# Patient Record
Sex: FEMALE | Race: BLACK OR AFRICAN AMERICAN | NOT HISPANIC OR LATINO | Employment: UNEMPLOYED | URBAN - METROPOLITAN AREA
[De-identification: names, ages, dates, MRNs, and addresses within clinical notes are randomized per-mention and may not be internally consistent; named-entity substitution may affect disease eponyms.]

---

## 2017-09-22 ENCOUNTER — HOSPITAL ENCOUNTER (INPATIENT)
Facility: HOSPITAL | Age: 72
LOS: 5 days | Discharge: HOME HEALTH | DRG: 570 | End: 2017-09-27
Attending: EMERGENCY MEDICINE | Admitting: INTERNAL MEDICINE
Payer: MEDICARE

## 2017-09-22 DIAGNOSIS — N18.6 ESRD (END STAGE RENAL DISEASE): ICD-10-CM

## 2017-09-22 DIAGNOSIS — L02.91 ABSCESS: ICD-10-CM

## 2017-09-22 DIAGNOSIS — E66.01 MORBID OBESITY DUE TO EXCESS CALORIES: ICD-10-CM

## 2017-09-22 DIAGNOSIS — L03.115 CELLULITIS AND ABSCESS OF RIGHT LEG: Primary | ICD-10-CM

## 2017-09-22 DIAGNOSIS — L02.415 CELLULITIS AND ABSCESS OF RIGHT LEG: Primary | ICD-10-CM

## 2017-09-22 LAB
ANION GAP SERPL CALC-SCNC: 15 MMOL/L
BASOPHILS # BLD AUTO: 0 K/UL
BASOPHILS NFR BLD: 0.1 %
BUN SERPL-MCNC: 42 MG/DL
CALCIUM SERPL-MCNC: 7.5 MG/DL
CHLORIDE SERPL-SCNC: 98 MMOL/L
CO2 SERPL-SCNC: 20 MMOL/L
CREAT SERPL-MCNC: 9.2 MG/DL
DIFFERENTIAL METHOD: ABNORMAL
EOSINOPHIL # BLD AUTO: 0.2 K/UL
EOSINOPHIL NFR BLD: 0.9 %
ERYTHROCYTE [DISTWIDTH] IN BLOOD BY AUTOMATED COUNT: 13.2 %
EST. GFR  (AFRICAN AMERICAN): 5 ML/MIN/1.73 M^2
EST. GFR  (NON AFRICAN AMERICAN): 4 ML/MIN/1.73 M^2
GLUCOSE SERPL-MCNC: 185 MG/DL
HCT VFR BLD AUTO: 28.5 %
HGB BLD-MCNC: 9.6 G/DL
LACTATE SERPL-SCNC: 1.1 MMOL/L
LYMPHOCYTES # BLD AUTO: 0.9 K/UL
LYMPHOCYTES NFR BLD: 3.6 %
MCH RBC QN AUTO: 30.5 PG
MCHC RBC AUTO-ENTMCNC: 33.5 G/DL
MCV RBC AUTO: 91 FL
MONOCYTES # BLD AUTO: 1.5 K/UL
MONOCYTES NFR BLD: 6 %
NEUTROPHILS # BLD AUTO: 22.3 K/UL
NEUTROPHILS NFR BLD: 89.4 %
PLATELET # BLD AUTO: 245 K/UL
PMV BLD AUTO: 9.6 FL
POCT GLUCOSE: 163 MG/DL (ref 70–110)
POCT GLUCOSE: 298 MG/DL (ref 70–110)
POTASSIUM SERPL-SCNC: 3.6 MMOL/L
RBC # BLD AUTO: 3.14 M/UL
SODIUM SERPL-SCNC: 133 MMOL/L
WBC # BLD AUTO: 24.9 K/UL

## 2017-09-22 PROCEDURE — 63600175 PHARM REV CODE 636 W HCPCS: Performed by: INTERNAL MEDICINE

## 2017-09-22 PROCEDURE — 25000003 PHARM REV CODE 250: Performed by: INTERNAL MEDICINE

## 2017-09-22 PROCEDURE — 83605 ASSAY OF LACTIC ACID: CPT

## 2017-09-22 PROCEDURE — 36415 COLL VENOUS BLD VENIPUNCTURE: CPT

## 2017-09-22 PROCEDURE — 0H9HXZZ DRAINAGE OF RIGHT UPPER LEG SKIN, EXTERNAL APPROACH: ICD-10-PCS | Performed by: EMERGENCY MEDICINE

## 2017-09-22 PROCEDURE — 10061 I&D ABSCESS COMP/MULTIPLE: CPT | Mod: RT

## 2017-09-22 PROCEDURE — 85025 COMPLETE CBC W/AUTO DIFF WBC: CPT

## 2017-09-22 PROCEDURE — 25000003 PHARM REV CODE 250: Performed by: EMERGENCY MEDICINE

## 2017-09-22 PROCEDURE — 99222 1ST HOSP IP/OBS MODERATE 55: CPT | Mod: ,,, | Performed by: INTERNAL MEDICINE

## 2017-09-22 PROCEDURE — 63600175 PHARM REV CODE 636 W HCPCS: Performed by: EMERGENCY MEDICINE

## 2017-09-22 PROCEDURE — 94760 N-INVAS EAR/PLS OXIMETRY 1: CPT

## 2017-09-22 PROCEDURE — 12000002 HC ACUTE/MED SURGE SEMI-PRIVATE ROOM

## 2017-09-22 PROCEDURE — 80048 BASIC METABOLIC PNL TOTAL CA: CPT

## 2017-09-22 PROCEDURE — 99284 EMERGENCY DEPT VISIT MOD MDM: CPT | Mod: 25

## 2017-09-22 PROCEDURE — 94761 N-INVAS EAR/PLS OXIMETRY MLT: CPT

## 2017-09-22 PROCEDURE — 83036 HEMOGLOBIN GLYCOSYLATED A1C: CPT

## 2017-09-22 RX ORDER — INSULIN ASPART 100 [IU]/ML
0-5 INJECTION, SOLUTION INTRAVENOUS; SUBCUTANEOUS
Status: DISCONTINUED | OUTPATIENT
Start: 2017-09-22 | End: 2017-09-27 | Stop reason: HOSPADM

## 2017-09-22 RX ORDER — PANTOPRAZOLE SODIUM 40 MG/1
40 TABLET, DELAYED RELEASE ORAL DAILY
Status: DISCONTINUED | OUTPATIENT
Start: 2017-09-23 | End: 2017-09-27 | Stop reason: HOSPADM

## 2017-09-22 RX ORDER — ENOXAPARIN SODIUM 100 MG/ML
30 INJECTION SUBCUTANEOUS
Status: DISCONTINUED | OUTPATIENT
Start: 2017-09-22 | End: 2017-09-23

## 2017-09-22 RX ORDER — GABAPENTIN 100 MG/1
100 CAPSULE ORAL DAILY
Status: DISCONTINUED | OUTPATIENT
Start: 2017-09-22 | End: 2017-09-27 | Stop reason: HOSPADM

## 2017-09-22 RX ORDER — GUAIFENESIN 600 MG/1
1200 TABLET, EXTENDED RELEASE ORAL 2 TIMES DAILY
Status: ON HOLD | COMMUNITY
End: 2017-09-22

## 2017-09-22 RX ORDER — CALCIUM CARBONATE 200(500)MG
1 TABLET,CHEWABLE ORAL DAILY
COMMUNITY

## 2017-09-22 RX ORDER — ACETAMINOPHEN 325 MG/1
650 TABLET ORAL EVERY 6 HOURS PRN
Status: DISCONTINUED | OUTPATIENT
Start: 2017-09-22 | End: 2017-09-27 | Stop reason: HOSPADM

## 2017-09-22 RX ORDER — HYDROMORPHONE HYDROCHLORIDE 2 MG/ML
0.5 INJECTION, SOLUTION INTRAMUSCULAR; INTRAVENOUS; SUBCUTANEOUS EVERY 4 HOURS PRN
Status: DISCONTINUED | OUTPATIENT
Start: 2017-09-22 | End: 2017-09-27 | Stop reason: HOSPADM

## 2017-09-22 RX ORDER — IBUPROFEN 200 MG
16 TABLET ORAL
Status: DISCONTINUED | OUTPATIENT
Start: 2017-09-22 | End: 2017-09-27 | Stop reason: HOSPADM

## 2017-09-22 RX ORDER — GLUCAGON 1 MG
1 KIT INJECTION
Status: DISCONTINUED | OUTPATIENT
Start: 2017-09-22 | End: 2017-09-27 | Stop reason: HOSPADM

## 2017-09-22 RX ORDER — CALCIUM CARBONATE 200(500)MG
1 TABLET,CHEWABLE ORAL DAILY
Status: DISCONTINUED | OUTPATIENT
Start: 2017-09-22 | End: 2017-09-27 | Stop reason: HOSPADM

## 2017-09-22 RX ORDER — LIDOCAINE HYDROCHLORIDE 20 MG/ML
10 INJECTION, SOLUTION EPIDURAL; INFILTRATION; INTRACAUDAL; PERINEURAL
Status: COMPLETED | OUTPATIENT
Start: 2017-09-22 | End: 2017-09-22

## 2017-09-22 RX ORDER — GABAPENTIN 100 MG/1
100 CAPSULE ORAL NIGHTLY
COMMUNITY

## 2017-09-22 RX ORDER — HYDROCODONE BITARTRATE AND ACETAMINOPHEN 5; 325 MG/1; MG/1
1 TABLET ORAL EVERY 6 HOURS PRN
Status: DISCONTINUED | OUTPATIENT
Start: 2017-09-22 | End: 2017-09-27 | Stop reason: HOSPADM

## 2017-09-22 RX ORDER — LORATADINE 10 MG/1
10 TABLET ORAL DAILY
COMMUNITY
End: 2023-05-18 | Stop reason: ALTCHOICE

## 2017-09-22 RX ORDER — IBUPROFEN 200 MG
24 TABLET ORAL
Status: DISCONTINUED | OUTPATIENT
Start: 2017-09-22 | End: 2017-09-27 | Stop reason: HOSPADM

## 2017-09-22 RX ADMIN — INSULIN ASPART 1 UNITS: 100 INJECTION, SOLUTION INTRAVENOUS; SUBCUTANEOUS at 10:09

## 2017-09-22 RX ADMIN — HYDROCODONE BITARTRATE AND ACETAMINOPHEN 1 TABLET: 5; 325 TABLET ORAL at 07:09

## 2017-09-22 RX ADMIN — VANCOMYCIN HYDROCHLORIDE 1500 MG: 1 INJECTION, POWDER, LYOPHILIZED, FOR SOLUTION INTRAVENOUS at 05:09

## 2017-09-22 RX ADMIN — ENOXAPARIN SODIUM 30 MG: 100 INJECTION SUBCUTANEOUS at 06:09

## 2017-09-22 RX ADMIN — PIPERACILLIN SODIUM AND TAZOBACTAM SODIUM 4.5 G: 4; .5 INJECTION, POWDER, FOR SOLUTION INTRAVENOUS at 10:09

## 2017-09-22 RX ADMIN — LIDOCAINE HYDROCHLORIDE 200 MG: 20 INJECTION, SOLUTION EPIDURAL; INFILTRATION; INTRACAUDAL; PERINEURAL at 12:09

## 2017-09-22 RX ADMIN — GABAPENTIN 100 MG: 100 CAPSULE ORAL at 06:09

## 2017-09-22 NOTE — PROGRESS NOTES
5:06 PM  Patient arrived from ED w/ order for 1x Vancomycin IV. Vanc bag spiked on new tubing/arrived with patient upon admission to floor. Per Dr Sparks's note, patient to be dialyzed in AM.     Called Dr Collier/Bridger's on call line- Dr Collier on call. Voice msg left for him regarding dose of Vancomycin-- whether to be given/held at this time. Awaiting call back.     5:09 PM  Received call back. Dr Collier states OK to administer dose of Vancomycin now.

## 2017-09-22 NOTE — NURSING
Patient transferred to room from ER. Vitals stable. Dr. Sparks notified regarding Nephrologist consult. Spoke with Rob at Dr. Sparks's office

## 2017-09-22 NOTE — ED PROVIDER NOTES
"Encounter Date: 9/22/2017    SCRIBE #1 NOTE: I, Chloe Cruz, am scribing for, and in the presence of, Dr. Addison.       History     Chief Complaint   Patient presents with    Fatigue     feeling weak and too tired to go to dialysis today, decreased appetite     9/22/2017  11:50 AM     Chief Complaint: Fatigue    The patient is a 72 y.o. female with PMHx of dialysis and DM who presents with fatigue. Patient c/o gradual onset of fatigue that has been constant for the past couple of days. Associated with nausea, decreased appetite and "feeling hot". No reported fevers. Pt is not drinking fluids. Denies any vomiting, diarrhea or abdominal pain. Pt also reports a rash to the right inner thigh with sharp pain and drainage for 1 week. Her rash rubs when walking and increases pain. No recent confusion or LOC. Pt was seen at ER 5 days ago for headache, ear pain and runny nose. Reports her symptoms are improving. No congestion or sob. Patient is Monday, Wednesday and Friday dialysis in [place]. Pt does not make urine. No SHx on file.      The history is provided by the patient.     Review of patient's allergies indicates:   Allergen Reactions    Ace inhibitors Other (See Comments)    Betadine [povidone-iodine] Rash    Dye Other (See Comments)    Gentamicin Rash     No past medical history on file.  No past surgical history on file.  No family history on file.  Social History   Substance Use Topics    Smoking status: Not on file    Smokeless tobacco: Not on file    Alcohol use Not on file     Review of Systems   Constitutional: Positive for appetite change and fatigue. Negative for chills and fever.   HENT: Positive for rhinorrhea. Negative for congestion and sore throat.    Respiratory: Negative for cough and shortness of breath.    Cardiovascular: Negative for chest pain.   Gastrointestinal: Positive for nausea. Negative for abdominal pain, diarrhea and vomiting.   Musculoskeletal: Positive for myalgias. " Negative for back pain.   Skin: Positive for rash.   Neurological: Negative for weakness and numbness.   Hematological: Does not bruise/bleed easily.   All other systems reviewed and are negative.      Physical Exam     Initial Vitals [09/22/17 1031]   BP Pulse Resp Temp SpO2   (!) 96/48 92 18 99.1 °F (37.3 °C) --      MAP       64         Physical Exam    Nursing note and vitals reviewed.  Constitutional: No distress.   HENT:   Head: Normocephalic and atraumatic.   Mouth/Throat: Mucous membranes are normal.   Eyes: EOM are normal. Pupils are equal, round, and reactive to light.   Neck: Normal range of motion.   Cardiovascular: Normal rate, regular rhythm, normal heart sounds, intact distal pulses and normal pulses. Exam reveals no gallop and no friction rub.    No murmur heard.  Pulses:       Radial pulses are 2+ on the right side, and 2+ on the left side.        Dorsalis pedis pulses are 2+ on the right side, and 2+ on the left side.        Posterior tibial pulses are 2+ on the right side, and 2+ on the left side.   Left upper extremity fistula with palpable thrill.   Pulmonary/Chest: Breath sounds normal. She has no wheezes. She has no rhonchi. She has no rales.   Abdominal: Soft. She exhibits no distension. There is no tenderness.   Musculoskeletal: Normal range of motion. She exhibits edema.   Trace pitting pedal edema.   Neurological: She is alert and oriented to person, place, and time.   Skin: Skin is dry. No rash noted.   Area of 10 cm induration, erythema and TTP with 2-3 cm area of fluctuance and spontaneous drainage. Does not extend to the perineum. No crepitus.   Psychiatric: She has a normal mood and affect.         ED Course   Procedures  Labs Reviewed   CBC W/ AUTO DIFFERENTIAL - Abnormal; Notable for the following:        Result Value    WBC 24.90 (*)     RBC 3.14 (*)     Hemoglobin 9.6 (*)     Hematocrit 28.5 (*)     Gran # 22.3 (*)     Lymph # 0.9 (*)     Mono # 1.5 (*)     Gran% 89.4 (*)      Lymph% 3.6 (*)     All other components within normal limits   BASIC METABOLIC PANEL - Abnormal; Notable for the following:     Sodium 133 (*)     CO2 20 (*)     Glucose 185 (*)     BUN, Bld 42 (*)     Creatinine 9.2 (*)     Calcium 7.5 (*)     eGFR if  5 (*)     eGFR if non  4 (*)     All other components within normal limits   LACTIC ACID, PLASMA               ABSCESS SIMPLE INCISION & DRAINAGE:  Verbal consent was obtained prior to the procedure with explanation of risks, benefits, and alternatives.  The area was anesthetized with 2% lidocaine intradermal infiltration prior to the start of the procedure.  A 6 cm abscess on the right medial thigh was prepared with betadine,  then incised with a #11 blade at 2 different locations for 2 cm each.  Copious purulent drainage was obtained.  The wound was swept to its depth 360 degrees with a hemostat to break up any loculations.  There were no recognized complications and the patient tolerated the procedure well.  Packing was inserted into the more proxima of the 2 incisions made.            Scribe Attestation:   Scribe #1: I performed the above scribed service and the documentation accurately describes the services I performed. I attest to the accuracy of the note.    Attending Attestation:           Physician Attestation for Scribe:  Physician Attestation Statement for Scribe #1: I, Dr. Addison, reviewed documentation, as scribed by Chloe Cruz in my presence, and it is both accurate and complete.         Isabella Boone is a 72 y.o. female presenting with constitutional symptoms of weakness with new cellulitis and abscess extending to the patient's adipose tissue on the right medial leg.  Low suspicion for penetrating into deeper fascia with wound probed to its depth.  Based on patient's comorbidities and extensive nature, I will admit for observation and IV antibiotics initially.  I have spoken with Dr. Howell from the  hospitalist service who will assume care.  IV vancomycin initially given here with further doses to be coordinated with dialysis.  I doubt sepsis.  I do not think broader spectrum antibiotics are necessary at this point.  I do not think further imaging or general surgery consultation is necessary at this point.  I doubt necrotizing infection.        ED Course      Clinical Impression:   The encounter diagnosis was Cellulitis and abscess of right leg.                           Norberto Addison MD  09/22/17 9707

## 2017-09-22 NOTE — H&P
"PCP: Primary Doctor No    History & Physical    Chief Complaint: Profound weakness    History of Present Illness:  Patient is a 72 y.o. female admitted to Hospitalist Service from [facility] Emergency Room with complaint of profound weakness. Patient reportedly has past medical history significant for morbid obesity, ESRD (on HD - M/W/F at [facility]). Patient c/o gradual onset of fatigue that has been constant for the past couple of days. Associated with nausea, decreased appetite and "feeling hot". No reported fevers. Pt is not drinking fluids. Denied any vomiting, diarrhea or abdominal pain. Pt also reported a rash to the right inner thigh with sharp pain and drainage for 1 week. Her rash rubs when walking and increases pain. No recent confusion or LOC. Pt was seen at ER 5 days ago for headache, ear pain and runny nose. Patient denied chest pain, shortness of breath, abdominal pain, nausea, vomiting, headache, vision changes, focal neuro-deficits, cough or fever. In the ER, noted to have right medial thigh and an abscess was I+D performed and wound packing done by the ER doctor.    PMH:  ESRD, obesity    No past surgical history on file.  No family history on file.  Social History   Substance Use Topics    Smoking status: Not on file    Smokeless tobacco: Not on file    Alcohol use Not on file      Review of patient's allergies indicates:   Allergen Reactions    Ace inhibitors Other (See Comments)    Betadine [povidone-iodine] Rash    Dye Other (See Comments)    Gentamicin Rash       (Not in a hospital admission)  Review of Systems:  Constitutional: no fever or chills. + fatigue, + low appetite  Eyes: no visual changes  Ears, nose, mouth, throat, and face: + Rhinorrhea  Respiratory: no cough or shorness of breath  Cardiovascular: no chest pain or palpitations  Gastrointestinal: no nausea or vomiting, no abdominal pain or change in bowel habits  Genitourinary: no hematuria or " dysuria  Integument/breast: + rash   Hematologic/lymphatic: no easy bruising or lymphadenopathy  Musculoskeletal: + arthralgias / myalgias  Neurological: no seizures or tremors.  Behavioral/Psych: no auditory or visual hallucinations  Endocrine: no heat or cold intolerance     OBJECTIVE:     Vital Signs (Most Recent)  Temp: 99.1 °F (37.3 °C) (09/22/17 1031)  Pulse: 88 (09/22/17 1437)  Resp: 18 (09/22/17 1437)  BP: (!) 105/56 (09/22/17 1437)  SpO2: 99 % (09/22/17 1231)    Physical Exam:  General appearance: well developed, appears stated age  Head: normocephalic, atraumatic  Eyes:  conjunctivae/corneas clear. PERRL.  Nose: Nares normal. Septum midline.  Throat: lips, mucosa, and tongue normal; teeth and gums normal, no throat erythema.  Neck: supple, symmetrical, trachea midline, no JVD and thyroid not enlarged, symmetric, no tenderness/mass/nodules  Lungs:  clear to auscultation bilaterally and normal respiratory effort  Chest wall: no tenderness  Heart: regular rate and rhythm, S1, S2 normal, no murmur, click, rub or gallop  Abdomen: soft, non-tender non-distented; bowel sounds normal; no masses,  no organomegaly  Extremities: no cyanosis, clubbing. Trace pitting pedal edema. Left upper extremity fistula with palpable thrill.   Pulses: 2+ and symmetric  Skin: Skin color, texture, turgor normal. Area of 10 cm induration, erythema and TTP with 2-3 cm area of fluctuance and spontaneous drainage. Does not extend to the perineum. No crepitus.   Lymph nodes: Cervical, supraclavicular, and axillary nodes normal.  Neurologic: Normal strength and tone. No focal numbness or weakness. CNII-XII intact.      Laboratory:   CBC:   Recent Labs  Lab 09/22/17  1310   WBC 24.90*   RBC 3.14*   HGB 9.6*   HCT 28.5*      MCV 91   MCH 30.5   MCHC 33.5     CMP:   Recent Labs  Lab 09/22/17  1310   *   CALCIUM 7.5*   *   K 3.6   CO2 20*   CL 98   BUN 42*   CREATININE 9.2*     Lactate: 1.1    No results found for:  HGBA1C  Microbiology Results (last 7 days)     ** No results found for the last 168 hours. **        Diagnostic Results: None    Assessment/Plan:     Active Hospital Problems    Diagnosis  POA    Cellulitis and abscess of right leg [L03.115, L02.415]  Keep extremity elevated.   Continue IV Zosyn and Vancomycin. Vancomycin doing per pharmacy.  Follow wound culture results.  Will consult general surgeon for evaluation, may need further I+D.    Yes    ESRD (end stage renal disease) [N18.6]  Consult a Nephrology Service for routine HD.  Monitor BUN/SCr.  Monitor I/Os.  Monitor electrolytes.    Yes    Morbid obesity [E66.01]  Body mass index is 42.6 kg/m². Morbid obesity complicates all aspects of disease management from diagnostic modalities to treatment. Weight loss encouraged and health benefits explained to patient.    DM-2  Check blood glucose level q AC/HS.  Use Novolog Insulin Sliding Scale as needed.   Continue American Diabetic Association 1800 Kcal diet.  Yes        VTE Risk Mitigation         Ordered     enoxaparin injection 30 mg  Every 24 hours (non-standard times)     Route:  Subcutaneous        09/22/17 1608     Low Risk of VTE  Once      09/22/17 1534        Joce Barney MD  Department of Hospital Medicine   [facility]

## 2017-09-22 NOTE — PROGRESS NOTES
Received consult for dialysis. Patient missed HD today due to feeling poorly. She has cellulitis with an abscess on her R thigh s/p I& D. Patient is hypotensive with an elevated WBC count. Renal panel reviewed- no acute clearance needs today. Will plan for dialysis in AM as long as she is hemodynamically stable. Complete consult to follow in AM.

## 2017-09-23 LAB
ANION GAP SERPL CALC-SCNC: 12 MMOL/L
BASOPHILS # BLD AUTO: 0 K/UL
BASOPHILS NFR BLD: 0 %
BUN SERPL-MCNC: 50 MG/DL
CALCIUM SERPL-MCNC: 7 MG/DL
CHLORIDE SERPL-SCNC: 98 MMOL/L
CO2 SERPL-SCNC: 22 MMOL/L
CREAT SERPL-MCNC: 10.6 MG/DL
DIFFERENTIAL METHOD: ABNORMAL
EOSINOPHIL # BLD AUTO: 0.4 K/UL
EOSINOPHIL NFR BLD: 2.1 %
ERYTHROCYTE [DISTWIDTH] IN BLOOD BY AUTOMATED COUNT: 13.6 %
EST. GFR  (AFRICAN AMERICAN): 4 ML/MIN/1.73 M^2
EST. GFR  (NON AFRICAN AMERICAN): 3 ML/MIN/1.73 M^2
ESTIMATED AVG GLUCOSE: 117 MG/DL
GLUCOSE SERPL-MCNC: 196 MG/DL
HBA1C MFR BLD HPLC: 5.7 %
HCT VFR BLD AUTO: 26.7 %
HGB BLD-MCNC: 9.1 G/DL
LYMPHOCYTES # BLD AUTO: 0.9 K/UL
LYMPHOCYTES NFR BLD: 4.7 %
MAGNESIUM SERPL-MCNC: 1.6 MG/DL
MCH RBC QN AUTO: 31.2 PG
MCHC RBC AUTO-ENTMCNC: 34.3 G/DL
MCV RBC AUTO: 91 FL
MONOCYTES # BLD AUTO: 1.4 K/UL
MONOCYTES NFR BLD: 7.3 %
NEUTROPHILS # BLD AUTO: 16.6 K/UL
NEUTROPHILS NFR BLD: 85.9 %
PHOSPHATE SERPL-MCNC: 2 MG/DL
PLATELET # BLD AUTO: 236 K/UL
PMV BLD AUTO: 9.8 FL
POCT GLUCOSE: 126 MG/DL (ref 70–110)
POCT GLUCOSE: 156 MG/DL (ref 70–110)
POCT GLUCOSE: 222 MG/DL (ref 70–110)
POTASSIUM SERPL-SCNC: 3.5 MMOL/L
RBC # BLD AUTO: 2.94 M/UL
SODIUM SERPL-SCNC: 132 MMOL/L
WBC # BLD AUTO: 19.3 K/UL

## 2017-09-23 PROCEDURE — 36415 COLL VENOUS BLD VENIPUNCTURE: CPT

## 2017-09-23 PROCEDURE — 25000003 PHARM REV CODE 250: Performed by: INTERNAL MEDICINE

## 2017-09-23 PROCEDURE — 99223 1ST HOSP IP/OBS HIGH 75: CPT | Mod: ,,, | Performed by: SURGERY

## 2017-09-23 PROCEDURE — 80048 BASIC METABOLIC PNL TOTAL CA: CPT

## 2017-09-23 PROCEDURE — 83735 ASSAY OF MAGNESIUM: CPT

## 2017-09-23 PROCEDURE — 85025 COMPLETE CBC W/AUTO DIFF WBC: CPT

## 2017-09-23 PROCEDURE — 63600175 PHARM REV CODE 636 W HCPCS: Performed by: INTERNAL MEDICINE

## 2017-09-23 PROCEDURE — 25000003 PHARM REV CODE 250: Performed by: EMERGENCY MEDICINE

## 2017-09-23 PROCEDURE — 84100 ASSAY OF PHOSPHORUS: CPT

## 2017-09-23 PROCEDURE — 12000002 HC ACUTE/MED SURGE SEMI-PRIVATE ROOM

## 2017-09-23 PROCEDURE — 80100016 HC MAINTENANCE HEMODIALYSIS

## 2017-09-23 RX ORDER — SODIUM CHLORIDE 9 MG/ML
INJECTION, SOLUTION INTRAVENOUS
Status: CANCELLED | OUTPATIENT
Start: 2017-09-23

## 2017-09-23 RX ORDER — HEPARIN SODIUM 5000 [USP'U]/ML
5000 INJECTION, SOLUTION INTRAVENOUS; SUBCUTANEOUS
Status: CANCELLED | OUTPATIENT
Start: 2017-09-23

## 2017-09-23 RX ORDER — SODIUM CHLORIDE 9 MG/ML
INJECTION, SOLUTION INTRAVENOUS ONCE
Status: CANCELLED | OUTPATIENT
Start: 2017-09-23 | End: 2017-09-23

## 2017-09-23 RX ADMIN — INSULIN ASPART 2 UNITS: 100 INJECTION, SOLUTION INTRAVENOUS; SUBCUTANEOUS at 12:09

## 2017-09-23 RX ADMIN — ENOXAPARIN SODIUM 30 MG: 100 INJECTION SUBCUTANEOUS at 05:09

## 2017-09-23 RX ADMIN — PIPERACILLIN SODIUM AND TAZOBACTAM SODIUM 4.5 G: 4; .5 INJECTION, POWDER, FOR SOLUTION INTRAVENOUS at 05:09

## 2017-09-23 RX ADMIN — CALCIUM CARBONATE (ANTACID) CHEW TAB 500 MG 500 MG: 500 CHEW TAB at 08:09

## 2017-09-23 RX ADMIN — HYDROCODONE BITARTRATE AND ACETAMINOPHEN 1 TABLET: 5; 325 TABLET ORAL at 11:09

## 2017-09-23 RX ADMIN — PIPERACILLIN SODIUM AND TAZOBACTAM SODIUM 4.5 G: 4; .5 INJECTION, POWDER, FOR SOLUTION INTRAVENOUS at 01:09

## 2017-09-23 RX ADMIN — HYDROCODONE BITARTRATE AND ACETAMINOPHEN 1 TABLET: 5; 325 TABLET ORAL at 03:09

## 2017-09-23 RX ADMIN — PANTOPRAZOLE SODIUM 40 MG: 40 TABLET, DELAYED RELEASE ORAL at 08:09

## 2017-09-23 RX ADMIN — GABAPENTIN 100 MG: 100 CAPSULE ORAL at 08:09

## 2017-09-23 RX ADMIN — PIPERACILLIN SODIUM AND TAZOBACTAM SODIUM 4.5 G: 4; .5 INJECTION, POWDER, FOR SOLUTION INTRAVENOUS at 09:09

## 2017-09-23 NOTE — PLAN OF CARE
Pt alert and oriented. Glucose monitored, no coverage needed. VSS. Safety intact. Complaints of pain. Will continue to monitor.

## 2017-09-23 NOTE — ASSESSMENT & PLAN NOTE
Continue antibiotics for now.  Change packing daily in both incision sites  Plan further incision and debridement in operating room if no improvement by tomorrow

## 2017-09-23 NOTE — PLAN OF CARE
Problem: Patient Care Overview  Goal: Plan of Care Review  Outcome: Ongoing (interventions implemented as appropriate)  Plan of care reviewed with patient, patient verbalized understanding. Safety maintained throughout shift.   Pt remained free of fall/trauma. Vs stable. Wound care provided to right thigh with packing and covered with border gauze. accuchecks performed before meals and covered with insulin as needed. Tele intact, patient running sinus rhythm. Pain controlled with prn medication.  Pt in dialysis at this time. Bed low, call light in reach. Will continue to monitor patient.

## 2017-09-23 NOTE — HPI
"The patient is a 72 y.o. female with PMHx of dialysis and DM who presents with fatigue. Patient c/o gradual onset of fatigue that has been constant for the past couple of days. Associated with nausea, decreased appetite and "feeling hot". No reported fevers. Pt is not drinking fluids. Denies any vomiting, diarrhea or abdominal pain. Pt also reports a rash to the right inner thigh with sharp pain and drainage for 1 week. Her rash rubs when walking and increases pain. No recent confusion or LOC. Pt was seen at ER 5 days ago for headache, ear pain and runny nose. Reports her symptoms are improving. No congestion or sob. Patient is Monday, Wednesday and Friday dialysis in [place]. Pt does not make urine. No SHx on file.    Abscess to upper inner thigh found on exam in ED on 9/22 and drained, admitted for further care.  "

## 2017-09-23 NOTE — SUBJECTIVE & OBJECTIVE
No current facility-administered medications on file prior to encounter.      No current outpatient prescriptions on file prior to encounter.       Review of patient's allergies indicates:   Allergen Reactions    Ace inhibitors Other (See Comments)    Betadine [povidone-iodine] Rash    Dye Other (See Comments)    Gentamicin Rash       Past Medical History:   Diagnosis Date    Arthritis     CHF (congestive heart failure)     Diabetes mellitus     Encounter for blood transfusion     Hypertension      Past Surgical History:   Procedure Laterality Date    EYE SURGERY      cataracts    HYSTERECTOMY      patial      Family History     Problem Relation (Age of Onset)    Arthritis Mother    Diabetes Mother, Daughter    Kidney disease Son, Son        Social History Main Topics    Smoking status: Never Smoker    Smokeless tobacco: Never Used    Alcohol use No    Drug use: No    Sexual activity: No     Review of Systems   Constitutional: no fever or chills. + fatigue, + low appetite  Eyes: no visual changes  Ears, nose, mouth, throat, and face: + Rhinorrhea  Respiratory: no cough or shorness of breath  Cardiovascular: no chest pain or palpitations  Gastrointestinal: no nausea or vomiting, no abdominal pain or change in bowel habits  Genitourinary: no hematuria or dysuria  Integument/breast: + rash   Hematologic/lymphatic: no easy bruising or lymphadenopathy  Musculoskeletal: + arthralgias / myalgias  Neurological: no seizures or tremors.  Behavioral/Psych: no auditory or visual hallucinations  Endocrine: no heat or cold intolerance           Objective:     Vital Signs (Most Recent):  Temp: 98.2 °F (36.8 °C) (09/23/17 0742)  Pulse: 71 (09/23/17 0742)  Resp: 16 (09/23/17 0742)  BP: (!) 107/52 (09/23/17 0742)  SpO2: 96 % (09/23/17 0742) Vital Signs (24h Range):  Temp:  [97.8 °F (36.6 °C)-99.4 °F (37.4 °C)] 98.2 °F (36.8 °C)  Pulse:  [71-94] 71  Resp:  [16-20] 16  SpO2:  [94 %-99 %] 96 %  BP: ()/(47-61)  107/52     Weight: 123.4 kg (272 lb)  Body mass index is 42.6 kg/m².    Physical Exam   Constitutional: She is oriented to person, place, and time. She appears well-developed and well-nourished. No distress.   HENT:   Head: Normocephalic and atraumatic.   Eyes: EOM are normal. Right eye exhibits no discharge. Left eye exhibits no discharge.   Neck: Neck supple. No JVD present. No thyromegaly present.   Cardiovascular: Regular rhythm.    Pulmonary/Chest: Effort normal. No respiratory distress. She has no wheezes.   Abdominal: Soft. Bowel sounds are normal. She exhibits no distension and no mass. There is no tenderness. There is no guarding.   Musculoskeletal: Normal range of motion. She exhibits edema. She exhibits no deformity.   Neurological: She is alert and oriented to person, place, and time. No cranial nerve deficit.   Skin: Skin is warm and dry. She is not diaphoretic. There is erythema.   Area of induration in right upper thigh  Packing in place with foul smelling drainage  No crepitance or bullae  Erythema- slight   Psychiatric: She has a normal mood and affect. Her behavior is normal.       Significant Labs:  CBC:   Recent Labs  Lab 09/23/17  0516   WBC 19.30*   RBC 2.94*   HGB 9.1*   HCT 26.7*      MCV 91   MCH 31.2*   MCHC 34.3     BMP:   Recent Labs  Lab 09/23/17  0516   *   *   K 3.5   CL 98   CO2 22*   BUN 50*   CREATININE 10.6*   CALCIUM 7.0*   MG 1.6       Significant Diagnostics:  none

## 2017-09-23 NOTE — ASSESSMENT & PLAN NOTE
Complicates this case making visibility of wound and exposure of abscess very difficult.  I will not be able to further incise this abscess at the bedside.

## 2017-09-23 NOTE — PLAN OF CARE
Problem: Patient Care Overview  Goal: Discharge Needs Assessment  Outcome: Ongoing (interventions implemented as appropriate)  Pt remained free of falls, injuries, or trauma during shift. Fall precautions maintained throughout shift. Bed kept in lowest position, locked. Call light within reach. Fall risk band onPt showed no new s/s of skin breakdown during shift. Pt was repositioned Q2hr to prevent skin breakdown. Wound care consult placed for wounds discovered during fecal incontinence care. Pt rounded on hourly until midnight, then every 2 hours after midnight. No acute events throughout the shift. PRN pain medications given as needed. VS and assessment performed per orders. Patient progressing towards goals as tolerated. Plan of care reviewed with pt, all concerns addressed. Will continue to monitor

## 2017-09-23 NOTE — PROGRESS NOTES
INPATIENT NEPHROLOGY CONSULT   [facility] NEPHROLOGY    Patient Name: Isabella Boone  Date: 09/23/2017    Reason for consultation: ESRD    Chief Complaint:   Chief Complaint   Patient presents with    Fatigue     feeling weak and too tired to go to dialysis today, decreased appetite       History of Present Illness:    The patient is a 72 woman admitted with thigh abscess.  She has a h/o esrd, anemia and hpth.  She initially presented with severe fatigue, nausea, hot flashes and poor appetite.  Today she denies n,v,sob, cp, neuro symptoms, urinary or bowel symptoms, or fever.  She does state her appetite is still low    Plan of Care:    Assessment:  esrd  Hypotension  Anemia  cellulits    Plan:    1. ESRD-dialysis today    2. Volume/Blood Pressure-uf judciously with hold parameters    3. Electrolytes/Acid Base-3 k bath    4. Bone Mineral Metabolism-hold binders due to low phos    5. Anemia of CKD-dami with hd        Thank you for allowing us to participate in this patient's care. We will continue to follow.    Vital Signs:  Temp Readings from Last 3 Encounters:   09/23/17 98.4 °F (36.9 °C) (Oral)       Pulse Readings from Last 3 Encounters:   09/23/17 74       BP Readings from Last 3 Encounters:   09/23/17 (!) 98/49       Weight:  Wt Readings from Last 3 Encounters:   09/22/17 123.4 kg (272 lb)       Past Medical & Surgical History:  Past Medical History:   Diagnosis Date    Arthritis     CHF (congestive heart failure)     Diabetes mellitus     Encounter for blood transfusion     Hypertension        Past Surgical History:   Procedure Laterality Date    EYE SURGERY      cataracts    HYSTERECTOMY      patial        Past Social History:  Social History     Social History    Marital status: Single     Spouse name: N/A    Number of children: N/A    Years of education: N/A     Social History Main Topics    Smoking status: Never Smoker    Smokeless tobacco: Never Used    Alcohol use No    Drug use: No     Sexual activity: No     Other Topics Concern    None     Social History Narrative    None       Medications:  No current facility-administered medications on file prior to encounter.      No current outpatient prescriptions on file prior to encounter.     Scheduled Meds:   calcium carbonate  1 tablet Oral Daily    enoxaparin  30 mg Subcutaneous Q24H    gabapentin  100 mg Oral Daily    pantoprazole  40 mg Oral Daily    piperacillin-tazobactam 4.5 g in dextrose 5 % 100 mL IVPB (ready to mix system)  4.5 g Intravenous Q8H     Continuous Infusions:   PRN Meds:.acetaminophen, dextrose 50%, dextrose 50%, glucagon (human recombinant), glucose, glucose, hydrocodone-acetaminophen 5-325mg, HYDROmorphone, insulin aspart    Allergies:  Ace inhibitors; Betadine [povidone-iodine]; Dye; and Gentamicin    Past Family History:  Reviewed; refer to Hospitalist Admission Note    Review of Systems:  Review of Systems - All 14 systems reviewed and negative, except as noted in HPI    Physical Exam:  General Appearance:    NAD, AAO x 3, cooperative, appears stated age   Head:    Normocephalic, atraumatic   Eyes:    PER, EOMI, and conjunctiva/sclera clear bilaterally       Throat:   Moist mucus membranes, no thrush or oral lesions, normal      dentition   Back:     No CVA tenderness   Lungs:     Clear to auscultation bilaterally, no wheezes, crackles, rales    or rhonchi, symmetric air movement, respirations unlabored   Chest wall:    No tenderness or deformity   Heart:    Regular rate and rhythm, S1 and S2 normal, no murmur, rub   or gallop   Abdomen:     Soft, non-tender, non-distended, bowel sounds active all four   quadrants, no RT or guarding, no masses, no organomegaly   Extremities:   Warm and well perfused, distal pulses are intact, no             cyanosis or peripheral edema   MSK:   No joint or muscle swelling, tenderness or deformity   Skin:   Skin color, texture, turgor normal, no rashes or lesions    Neurologic/Psychiatric:   CNII-XII intact, normal strength and sensation       throughout, no asterixis; normal affect, memory, judgement     and insight      Results:  Lab Results   Component Value Date     (L) 09/23/2017    K 3.5 09/23/2017    CL 98 09/23/2017    CO2 22 (L) 09/23/2017    BUN 50 (H) 09/23/2017    CREATININE 10.6 (H) 09/23/2017    CALCIUM 7.0 (L) 09/23/2017    ANIONGAP 12 09/23/2017    ESTGFRAFRICA 4 (A) 09/23/2017    EGFRNONAA 3 (A) 09/23/2017       Lab Results   Component Value Date    CALCIUM 7.0 (L) 09/23/2017    PHOS 2.0 (L) 09/23/2017         Recent Labs  Lab 09/23/17  0516   WBC 19.30*   RBC 2.94*   HGB 9.1*   HCT 26.7*      MCV 91   MCH 31.2*   MCHC 34.3       I have personally reviewed pertinent radiological imaging and reports.    Jesse Collier MD  Nephrology  [facility]  [phone]

## 2017-09-23 NOTE — CONSULTS
"[facility]  General Surgery  Consult Note    Patient Name: Isabella Boone  MRN: [id number]  Code Status: Full Code  Admission Date: 9/22/2017  Hospital Length of Stay: 1 days  Attending Physician: Day Barney MD  Primary Care Provider: Primary Doctor No    Patient information was obtained from patient and ER records.     Inpatient consult to General Surgery  Consult performed by: NICOLAS ELENA  Consult ordered by: DAY BARNEY  Reason for consult: abscess  Assessment/Recommendations: Further I and D tomorrow if no improvement  Continue antibiotics        Subjective:     Principal Problem: <principal problem not specified>    History of Present Illness: The patient is a 72 y.o. female with PMHx of dialysis and DM who presents with fatigue. Patient c/o gradual onset of fatigue that has been constant for the past couple of days. Associated with nausea, decreased appetite and "feeling hot". No reported fevers. Pt is not drinking fluids. Denies any vomiting, diarrhea or abdominal pain. Pt also reports a rash to the right inner thigh with sharp pain and drainage for 1 week. Her rash rubs when walking and increases pain. No recent confusion or LOC. Pt was seen at ER 5 days ago for headache, ear pain and runny nose. Reports her symptoms are improving. No congestion or sob. Patient is Monday, Wednesday and Friday dialysis in [place]. Pt does not make urine. No SHx on file.    Abscess to upper inner thigh found on exam in ED on 9/22 and drained, admitted for further care.    No current facility-administered medications on file prior to encounter.      No current outpatient prescriptions on file prior to encounter.       Review of patient's allergies indicates:   Allergen Reactions    Ace inhibitors Other (See Comments)    Betadine [povidone-iodine] Rash    Dye Other (See Comments)    Gentamicin Rash       Past Medical History:   Diagnosis Date    Arthritis     CHF (congestive heart failure)  "    Diabetes mellitus     Encounter for blood transfusion     Hypertension      Past Surgical History:   Procedure Laterality Date    EYE SURGERY      cataracts    HYSTERECTOMY      patial      Family History     Problem Relation (Age of Onset)    Arthritis Mother    Diabetes Mother, Daughter    Kidney disease Son, Son        Social History Main Topics    Smoking status: Never Smoker    Smokeless tobacco: Never Used    Alcohol use No    Drug use: No    Sexual activity: No     Review of Systems   Constitutional: no fever or chills. + fatigue, + low appetite  Eyes: no visual changes  Ears, nose, mouth, throat, and face: + Rhinorrhea  Respiratory: no cough or shorness of breath  Cardiovascular: no chest pain or palpitations  Gastrointestinal: no nausea or vomiting, no abdominal pain or change in bowel habits  Genitourinary: no hematuria or dysuria  Integument/breast: + rash   Hematologic/lymphatic: no easy bruising or lymphadenopathy  Musculoskeletal: + arthralgias / myalgias  Neurological: no seizures or tremors.  Behavioral/Psych: no auditory or visual hallucinations  Endocrine: no heat or cold intolerance           Objective:     Vital Signs (Most Recent):  Temp: 98.2 °F (36.8 °C) (09/23/17 0742)  Pulse: 71 (09/23/17 0742)  Resp: 16 (09/23/17 0742)  BP: (!) 107/52 (09/23/17 0742)  SpO2: 96 % (09/23/17 0742) Vital Signs (24h Range):  Temp:  [97.8 °F (36.6 °C)-99.4 °F (37.4 °C)] 98.2 °F (36.8 °C)  Pulse:  [71-94] 71  Resp:  [16-20] 16  SpO2:  [94 %-99 %] 96 %  BP: ()/(47-61) 107/52     Weight: 123.4 kg (272 lb)  Body mass index is 42.6 kg/m².    Physical Exam   Constitutional: She is oriented to person, place, and time. She appears well-developed and well-nourished. No distress.   HENT:   Head: Normocephalic and atraumatic.   Eyes: EOM are normal. Right eye exhibits no discharge. Left eye exhibits no discharge.   Neck: Neck supple. No JVD present. No thyromegaly present.   Cardiovascular: Regular  rhythm.    Pulmonary/Chest: Effort normal. No respiratory distress. She has no wheezes.   Abdominal: Soft. Bowel sounds are normal. She exhibits no distension and no mass. There is no tenderness. There is no guarding.   Musculoskeletal: Normal range of motion. She exhibits edema. She exhibits no deformity.   Neurological: She is alert and oriented to person, place, and time. No cranial nerve deficit.   Skin: Skin is warm and dry. She is not diaphoretic. There is erythema.   Area of induration in right upper thigh  Packing in place with foul smelling drainage  No crepitance or bullae  Erythema- slight   Psychiatric: She has a normal mood and affect. Her behavior is normal.       Significant Labs:  CBC:   Recent Labs  Lab 09/23/17  0516   WBC 19.30*   RBC 2.94*   HGB 9.1*   HCT 26.7*      MCV 91   MCH 31.2*   MCHC 34.3     BMP:   Recent Labs  Lab 09/23/17  0516   *   *   K 3.5   CL 98   CO2 22*   BUN 50*   CREATININE 10.6*   CALCIUM 7.0*   MG 1.6       Significant Diagnostics:  none    Assessment/Plan:     Morbid obesity    Complicates this case making visibility of wound and exposure of abscess very difficult.  I will not be able to further incise this abscess at the bedside.        Cellulitis and abscess of right leg    Continue antibiotics for now.  Change packing daily in both incision sites  Plan further incision and debridement in operating room if no improvement by tomorrow          VTE Risk Mitigation         Ordered     enoxaparin injection 30 mg  Every 24 hours (non-standard times)     Route:  Subcutaneous        09/22/17 1608     Low Risk of VTE  Once      09/22/17 1534          Thank you for your consult. I will follow-up with patient. Please contact us if you have any additional questions.    Danny Stack MD  General Surgery  [facility]

## 2017-09-24 ENCOUNTER — ANESTHESIA (OUTPATIENT)
Dept: SURGERY | Facility: HOSPITAL | Age: 72
DRG: 570 | End: 2017-09-24
Payer: MEDICARE

## 2017-09-24 ENCOUNTER — ANESTHESIA EVENT (OUTPATIENT)
Dept: SURGERY | Facility: HOSPITAL | Age: 72
DRG: 570 | End: 2017-09-24
Payer: MEDICARE

## 2017-09-24 ENCOUNTER — SURGERY (OUTPATIENT)
Age: 72
End: 2017-09-24

## 2017-09-24 LAB
ANION GAP SERPL CALC-SCNC: 12 MMOL/L
BASOPHILS # BLD AUTO: 0 K/UL
BASOPHILS NFR BLD: 0.2 %
BUN SERPL-MCNC: 33 MG/DL
CALCIUM SERPL-MCNC: 7.3 MG/DL
CHLORIDE SERPL-SCNC: 98 MMOL/L
CO2 SERPL-SCNC: 24 MMOL/L
CREAT SERPL-MCNC: 7.8 MG/DL
DIFFERENTIAL METHOD: ABNORMAL
EOSINOPHIL # BLD AUTO: 0.4 K/UL
EOSINOPHIL NFR BLD: 2.8 %
ERYTHROCYTE [DISTWIDTH] IN BLOOD BY AUTOMATED COUNT: 13.6 %
EST. GFR  (AFRICAN AMERICAN): 6 ML/MIN/1.73 M^2
EST. GFR  (NON AFRICAN AMERICAN): 5 ML/MIN/1.73 M^2
GLUCOSE SERPL-MCNC: 183 MG/DL
HCT VFR BLD AUTO: 27.2 %
HGB BLD-MCNC: 9.1 G/DL
LYMPHOCYTES # BLD AUTO: 0.8 K/UL
LYMPHOCYTES NFR BLD: 5.4 %
MAGNESIUM SERPL-MCNC: 1.7 MG/DL
MCH RBC QN AUTO: 30.5 PG
MCHC RBC AUTO-ENTMCNC: 33.6 G/DL
MCV RBC AUTO: 91 FL
MONOCYTES # BLD AUTO: 1.2 K/UL
MONOCYTES NFR BLD: 7.6 %
NEUTROPHILS # BLD AUTO: 13.3 K/UL
NEUTROPHILS NFR BLD: 84 %
PHOSPHATE SERPL-MCNC: 2.5 MG/DL
PLATELET # BLD AUTO: 272 K/UL
PMV BLD AUTO: 9.9 FL
POCT GLUCOSE: 186 MG/DL (ref 70–110)
POCT GLUCOSE: 208 MG/DL (ref 70–110)
POCT GLUCOSE: 245 MG/DL (ref 70–110)
POCT GLUCOSE: 265 MG/DL (ref 70–110)
POTASSIUM SERPL-SCNC: 3.7 MMOL/L
RBC # BLD AUTO: 3 M/UL
SODIUM SERPL-SCNC: 134 MMOL/L
WBC # BLD AUTO: 15.8 K/UL

## 2017-09-24 PROCEDURE — 94761 N-INVAS EAR/PLS OXIMETRY MLT: CPT

## 2017-09-24 PROCEDURE — 87070 CULTURE OTHR SPECIMN AEROBIC: CPT

## 2017-09-24 PROCEDURE — 37000008 HC ANESTHESIA 1ST 15 MINUTES: Performed by: SURGERY

## 2017-09-24 PROCEDURE — 36000705 HC OR TIME LEV I EA ADD 15 MIN: Performed by: SURGERY

## 2017-09-24 PROCEDURE — 80048 BASIC METABOLIC PNL TOTAL CA: CPT

## 2017-09-24 PROCEDURE — 84100 ASSAY OF PHOSPHORUS: CPT

## 2017-09-24 PROCEDURE — 71000033 HC RECOVERY, INTIAL HOUR: Performed by: SURGERY

## 2017-09-24 PROCEDURE — 63600175 PHARM REV CODE 636 W HCPCS: Performed by: ANESTHESIOLOGY

## 2017-09-24 PROCEDURE — 83735 ASSAY OF MAGNESIUM: CPT

## 2017-09-24 PROCEDURE — D9220A PRA ANESTHESIA: Mod: ANES,,, | Performed by: ANESTHESIOLOGY

## 2017-09-24 PROCEDURE — 10061 I&D ABSCESS COMP/MULTIPLE: CPT | Mod: ,,, | Performed by: SURGERY

## 2017-09-24 PROCEDURE — 87186 SC STD MICRODIL/AGAR DIL: CPT

## 2017-09-24 PROCEDURE — 71000039 HC RECOVERY, EACH ADD'L HOUR: Performed by: SURGERY

## 2017-09-24 PROCEDURE — 85025 COMPLETE CBC W/AUTO DIFF WBC: CPT

## 2017-09-24 PROCEDURE — 0JBL0ZZ EXCISION OF RIGHT UPPER LEG SUBCUTANEOUS TISSUE AND FASCIA, OPEN APPROACH: ICD-10-PCS | Performed by: SURGERY

## 2017-09-24 PROCEDURE — 12000002 HC ACUTE/MED SURGE SEMI-PRIVATE ROOM

## 2017-09-24 PROCEDURE — 37000009 HC ANESTHESIA EA ADD 15 MINS: Performed by: SURGERY

## 2017-09-24 PROCEDURE — 36415 COLL VENOUS BLD VENIPUNCTURE: CPT

## 2017-09-24 PROCEDURE — D9220A PRA ANESTHESIA: Mod: CRNA,,, | Performed by: NURSE ANESTHETIST, CERTIFIED REGISTERED

## 2017-09-24 PROCEDURE — 87077 CULTURE AEROBIC IDENTIFY: CPT

## 2017-09-24 PROCEDURE — 63600175 PHARM REV CODE 636 W HCPCS: Performed by: NURSE ANESTHETIST, CERTIFIED REGISTERED

## 2017-09-24 PROCEDURE — 27200651 HC AIRWAY, LMA: Performed by: NURSE ANESTHETIST, CERTIFIED REGISTERED

## 2017-09-24 PROCEDURE — 87075 CULTR BACTERIA EXCEPT BLOOD: CPT

## 2017-09-24 PROCEDURE — 25000003 PHARM REV CODE 250: Performed by: INTERNAL MEDICINE

## 2017-09-24 PROCEDURE — 25000003 PHARM REV CODE 250: Performed by: NURSE ANESTHETIST, CERTIFIED REGISTERED

## 2017-09-24 PROCEDURE — 36000704 HC OR TIME LEV I 1ST 15 MIN: Performed by: SURGERY

## 2017-09-24 PROCEDURE — 63600175 PHARM REV CODE 636 W HCPCS: Performed by: INTERNAL MEDICINE

## 2017-09-24 RX ORDER — SODIUM CHLORIDE 9 MG/ML
INJECTION, SOLUTION INTRAVENOUS ONCE
Status: CANCELLED | OUTPATIENT
Start: 2017-09-24 | End: 2017-09-24

## 2017-09-24 RX ORDER — PHENYLEPHRINE HYDROCHLORIDE 10 MG/ML
INJECTION INTRAVENOUS
Status: DISCONTINUED | OUTPATIENT
Start: 2017-09-24 | End: 2017-09-24

## 2017-09-24 RX ORDER — SODIUM CHLORIDE 9 MG/ML
INJECTION, SOLUTION INTRAVENOUS CONTINUOUS PRN
Status: DISCONTINUED | OUTPATIENT
Start: 2017-09-24 | End: 2017-09-24

## 2017-09-24 RX ORDER — SODIUM CHLORIDE 9 MG/ML
INJECTION, SOLUTION INTRAVENOUS
Status: CANCELLED | OUTPATIENT
Start: 2017-09-24

## 2017-09-24 RX ORDER — FENTANYL CITRATE 50 UG/ML
25 INJECTION, SOLUTION INTRAMUSCULAR; INTRAVENOUS EVERY 5 MIN PRN
Status: DISCONTINUED | OUTPATIENT
Start: 2017-09-24 | End: 2017-09-24

## 2017-09-24 RX ORDER — ENOXAPARIN SODIUM 100 MG/ML
30 INJECTION SUBCUTANEOUS EVERY 24 HOURS
Status: DISCONTINUED | OUTPATIENT
Start: 2017-09-24 | End: 2017-09-24

## 2017-09-24 RX ORDER — LIDOCAINE HCL/PF 100 MG/5ML
SYRINGE (ML) INTRAVENOUS
Status: DISCONTINUED | OUTPATIENT
Start: 2017-09-24 | End: 2017-09-24

## 2017-09-24 RX ORDER — FENTANYL CITRATE 50 UG/ML
INJECTION, SOLUTION INTRAMUSCULAR; INTRAVENOUS
Status: DISCONTINUED | OUTPATIENT
Start: 2017-09-24 | End: 2017-09-24

## 2017-09-24 RX ORDER — SODIUM CHLORIDE, SODIUM LACTATE, POTASSIUM CHLORIDE, CALCIUM CHLORIDE 600; 310; 30; 20 MG/100ML; MG/100ML; MG/100ML; MG/100ML
1000 INJECTION, SOLUTION INTRAVENOUS ONCE
Status: DISCONTINUED | OUTPATIENT
Start: 2017-09-24 | End: 2017-09-24

## 2017-09-24 RX ORDER — PROPOFOL 10 MG/ML
VIAL (ML) INTRAVENOUS
Status: DISCONTINUED | OUTPATIENT
Start: 2017-09-24 | End: 2017-09-24

## 2017-09-24 RX ORDER — ONDANSETRON HYDROCHLORIDE 2 MG/ML
INJECTION, SOLUTION INTRAMUSCULAR; INTRAVENOUS
Status: DISCONTINUED | OUTPATIENT
Start: 2017-09-24 | End: 2017-09-24

## 2017-09-24 RX ADMIN — FENTANYL CITRATE 50 MCG: 50 INJECTION INTRAMUSCULAR; INTRAVENOUS at 10:09

## 2017-09-24 RX ADMIN — PIPERACILLIN SODIUM AND TAZOBACTAM SODIUM 4.5 G: 4; .5 INJECTION, POWDER, FOR SOLUTION INTRAVENOUS at 09:09

## 2017-09-24 RX ADMIN — FENTANYL CITRATE 25 MCG: 50 INJECTION, SOLUTION INTRAMUSCULAR; INTRAVENOUS at 12:09

## 2017-09-24 RX ADMIN — PIPERACILLIN SODIUM AND TAZOBACTAM SODIUM 4.5 G: 4; .5 INJECTION, POWDER, FOR SOLUTION INTRAVENOUS at 02:09

## 2017-09-24 RX ADMIN — PIPERACILLIN SODIUM AND TAZOBACTAM SODIUM 4.5 G: 4; .5 INJECTION, POWDER, FOR SOLUTION INTRAVENOUS at 06:09

## 2017-09-24 RX ADMIN — PHENYLEPHRINE HYDROCHLORIDE 200 MCG: 10 INJECTION INTRAVENOUS at 11:09

## 2017-09-24 RX ADMIN — LIDOCAINE HYDROCHLORIDE 75 MG: 20 INJECTION, SOLUTION INTRAVENOUS at 11:09

## 2017-09-24 RX ADMIN — PROPOFOL 30 MG: 10 INJECTION, EMULSION INTRAVENOUS at 11:09

## 2017-09-24 RX ADMIN — INSULIN ASPART 3 UNITS: 100 INJECTION, SOLUTION INTRAVENOUS; SUBCUTANEOUS at 04:09

## 2017-09-24 RX ADMIN — FENTANYL CITRATE 50 MCG: 50 INJECTION INTRAMUSCULAR; INTRAVENOUS at 11:09

## 2017-09-24 RX ADMIN — INSULIN ASPART 1 UNITS: 100 INJECTION, SOLUTION INTRAVENOUS; SUBCUTANEOUS at 09:09

## 2017-09-24 RX ADMIN — PROPOFOL 150 MG: 10 INJECTION, EMULSION INTRAVENOUS at 11:09

## 2017-09-24 RX ADMIN — ONDANSETRON 4 MG: 2 INJECTION, SOLUTION INTRAMUSCULAR; INTRAVENOUS at 11:09

## 2017-09-24 RX ADMIN — INSULIN ASPART 2 UNITS: 100 INJECTION, SOLUTION INTRAVENOUS; SUBCUTANEOUS at 07:09

## 2017-09-24 RX ADMIN — PROPOFOL 20 MG: 10 INJECTION, EMULSION INTRAVENOUS at 10:09

## 2017-09-24 RX ADMIN — SODIUM CHLORIDE: 0.9 INJECTION, SOLUTION INTRAVENOUS at 10:09

## 2017-09-24 RX ADMIN — LIDOCAINE HYDROCHLORIDE 100 MG: 20 INJECTION, SOLUTION INTRAVENOUS at 11:09

## 2017-09-24 NOTE — PROGRESS NOTES
3 hour dialysis tx today, net uf 1.3L. BP ran 90's systolic during tx.  Pt remained alert, without complaints

## 2017-09-24 NOTE — PROGRESS NOTES
Progress Note  Gen Surg    Admit Date: 9/22/2017  Attending: Seda  S/P: Procedure(s) (LRB):  INCISION AND DRAINAGE (I & D)-LEG (Right)    Post-operative Day: Day of Surgery    Hospital Day: 3    SUBJECTIVE:     Bandage changed yesterday  Still having drainage     OBJECTIVE:     Vital Signs (Most Recent)  Temp: 97.9 °F (36.6 °C) (09/24/17 0758)  Pulse: 78 (09/24/17 0759)  Resp: 16 (09/24/17 0758)  BP: (!) 103/50 (09/24/17 0759)  SpO2: 100 % (09/24/17 0759)    Vital Signs Range (Last 24H):  Temp:  [96.8 °F (36 °C)-99.9 °F (37.7 °C)]   Pulse:  [68-89]   Resp:  [16-20]   BP: ()/(43-59)   SpO2:  [97 %-100 %]     I & O (Last 24H):  Intake/Output Summary (Last 24 hours) at 09/24/17 1031  Last data filed at 09/23/17 1900   Gross per 24 hour   Intake              800 ml   Output             2000 ml   Net            -1200 ml       Scheduled medications:   calcium carbonate  1 tablet Oral Daily    gabapentin  100 mg Oral Daily    pantoprazole  40 mg Oral Daily    piperacillin-tazobactam 4.5 g in dextrose 5 % 100 mL IVPB (ready to mix system)  4.5 g Intravenous Q8H       Physical Exam:  General: no distress  Lungs:  clear to auscultation bilaterally and normal respiratory effort  Heart: regular rate and rhythm, S1, S2 normal, no murmur, rub or gallop  Abdomen: soft, non-tender non-distented; bowel sounds normal; no masses,  no organomegaly    Wound/Incision:  Ongoing induration    Laboratory:  CBC:   Recent Labs  Lab 09/24/17  0510   WBC 15.80*   RBC 3.00*   HGB 9.1*   HCT 27.2*      MCV 91   MCH 30.5   MCHC 33.6     BMP:   Recent Labs  Lab 09/24/17  0510   *   *   K 3.7   CL 98   CO2 24   BUN 33*   CREATININE 7.8*   CALCIUM 7.3*     Labs within the past 24 hours have been reviewed.    ASSESSMENT/PLAN:     Further I and D in operating room today    Danny Stack MD

## 2017-09-24 NOTE — ANESTHESIA PREPROCEDURE EVALUATION
09/24/2017  Isabella Boone is a 72 y.o., female.    Anesthesia Evaluation    I have reviewed the Patient Summary Reports.    I have reviewed the Nursing Notes.   I have reviewed the Medications.     Review of Systems  Anesthesia Hx:  No problems with previous Anesthesia    Social:  Non-Smoker    Hematology/Oncology:  Hematology Normal   Oncology Normal     EENT/Dental:   Upper dentures   Cardiovascular:   Hypertension CHF    Pulmonary:  Pulmonary Normal    Renal/:   Chronic Renal Disease, ESRD    Musculoskeletal:  Musculoskeletal Normal    Neurological:  Neurology Normal    Endocrine:   Diabetes, type 2    Dermatological:   Cellulitis and abscess of right leg    Psych:  Psychiatric Normal           Physical Exam  General:  Morbid Obesity    Airway/Jaw/Neck:  Airway Findings: Mouth Opening: Normal Tongue: Normal  General Airway Assessment: Adult  Mallampati: I  TM Distance: Normal, at least 6 cm        Eyes/Ears/Nose:  EYES/EARS/NOSE FINDINGS: Normal   Dental:  Dental Findings: Upper Dentures   Chest/Lungs:  Chest/Lungs Findings: Normal Respiratory Rate, Expiratory Wheezes, Mild     Heart/Vascular:  Heart Findings: Rate: Normal  Rhythm: Regular Rhythm  Sounds: Normal  Vascular Findings: Normal    Abdomen:  Abdomen Findings: Normal    Musculoskeletal:  Musculoskeletal Findings: Normal   Skin:  Skin Findings: Normal    Mental Status:  Mental Status Findings: Normal        Anesthesia Plan  Type of Anesthesia, risks & benefits discussed:  Anesthesia Type:  general  Patient's Preference:   Intra-op Monitoring Plan: standard ASA monitors  Intra-op Monitoring Plan Comments:   Post Op Pain Control Plan: IV/PO Opioids PRN  Post Op Pain Control Plan Comments:   Induction:   IV  Beta Blocker:  Patient is not currently on a Beta-Blocker (No further documentation required).       Informed Consent: Patient understands  risks and agrees with Anesthesia plan.  Questions answered. Anesthesia consent signed with patient.  ASA Score: 3     Day of Surgery Review of History & Physical: I have interviewed and examined the patient. I have reviewed the patient's H&P dated:    H&P update referred to the surgeon.         Ready For Surgery From Anesthesia Perspective.

## 2017-09-24 NOTE — PLAN OF CARE
Problem: Patient Care Overview  Goal: Plan of Care Review  Outcome: Ongoing (interventions implemented as appropriate)  POC reviewed with patient. Verbalized understanding. IV antibiotics given during shift per doctor's orders. Patient reports pain treated with medication ordered by Md. Patient tolerating a Renal diet well. No complaints of N/V. Patient NPO at midnight.  Bandage to the inner right thigh clean dry and intact, bandage changed during shift. Bedpan at the bedside. Hourly rounding during shift to promote safety. Safety maintained throughout the shift.Patient positions and repositions self independently.No new Skin break down.   Bed locked and in lowest position. Call light in reach. Side rails up x2.  Patient remained free of falls/ trauma.  Will continue to monitor.

## 2017-09-24 NOTE — PROGRESS NOTES
"Hospital Medicine    CC: left leg cellulitis    HPI 72 y.o. female seen and examined with no acute events      Past Medical History:   Diagnosis Date    Arthritis     CHF (congestive heart failure)     Diabetes mellitus     Encounter for blood transfusion     Hypertension          Review of Systems  General ROS: negative for chills, fever or weight loss; positive for fatigue  Cardiovascular ROS: no chest pain or dyspnea on exertion  Gastrointestinal ROS: no abdominal pain, change in bowel habits, or black/ bloody stools    Physical Examination  BP (!) 115/57 (BP Location: Right arm, Patient Position: Lying)   Pulse 80   Temp 98.4 °F (36.9 °C) (Oral)   Resp 18   Ht 5' 7" (1.702 m)   Wt 123.4 kg (272 lb)   LMP  (LMP Unknown)   SpO2 97%   Breastfeeding? No   BMI 42.60 kg/m²   General appearance: alert, cooperative, no distress  HENT: Normocephalic, atraumatic, neck symmetrical, no nasal discharge   Lungs: clear to auscultation bilaterally, no dullness to percussion bilaterally  Heart: regular rate and rhythm without rub; no displacement of the PMI   Abdomen: soft, non-tender; bowel sounds normoactive; no organomegaly  Extremities: extremities symmetric; no clubbing, cyanosis; right leg with erythema and edema  Neurologic: Alert and oriented X 3, normal strength, normal coordination and gait    Labs:  Lab Results   Component Value Date    WBC 19.30 (H) 09/23/2017    HGB 9.1 (L) 09/23/2017    HCT 26.7 (L) 09/23/2017    MCV 91 09/23/2017     09/23/2017         Imaging: reviewed    Assessment and Plan:    Active Hospital Problems     Diagnosis   POA    Cellulitis and abscess of right leg [L03.115, L02.415]  Keep extremity elevated.   Continue IV Zosyn and Vancomycin. Vancomycin doing per pharmacy.  Follow wound culture results.  Likely I and D in OR tomorrow morning      Yes    ESRD (end stage renal disease) [N18.6]  HD today  Monitor BUN/SCr.  Monitor I/Os.  Monitor electrolytes.   Yes    Morbid " obesity [E66.01]  Body mass index is 42.6 kg/m². Morbid obesity complicates all aspects of disease management from diagnostic modalities to treatment. Weight loss encouraged and health benefits explained to patient.     DM-2  Check blood glucose level q AC/HS.  Use Novolog Insulin Sliding Scale as needed.   Continue American Diabetic Association 1800 Kcal diet.   Yes                VTE Risk Mitigation          Ordered       enoxaparin injection 30 mg  Every 24 hours (non-standard times)     Route:  Subcutaneous

## 2017-09-24 NOTE — PLAN OF CARE
Report to Wade. Spoke to hospitalist about Enoxaparin, stated he would order it on the patient. VS stable, patient in no distress.

## 2017-09-24 NOTE — PROGRESS NOTES
INPATIENT NEPHROLOGY PROGRESS NOTE  [facility] NEPHROLOGY    Isabella Boone  09/24/2017    Reason for consultation:         esrd    Chief Complaint:   Chief Complaint   Patient presents with    Fatigue     feeling weak and too tired to go to dialysis today, decreased appetite        History of Present Illness:     The patient is a 72 woman admitted with thigh abscess.  She has a h/o esrd, anemia and hpth.  She initially presented with severe fatigue, nausea, hot flashes and poor appetite.  Today she denies n,v,sob, cp, neuro symptoms, urinary or bowel symptoms, or fever.    9/24 sleeping      Plan of Care:     Assessment:     esrd  S/p I and D abscess  Anemia  hypertension    Plan:     1. esrd--mwf hd    2. Volume/Blood Pressure-uf as tolerated    3. Electrolytes/Acid Base-3 k bath    4. Bone Mineral Metabolism-outpt activated vit d    5. Anemia of CKD-erythropoiesis stimulating agent with renal replacement therapy      6. Medications- renal dose for gfr 10        Thank you for allowing us to participate in this patient's care. We will continue to follow.    Medications:  No current facility-administered medications on file prior to encounter.      No current outpatient prescriptions on file prior to encounter.     Scheduled Meds:   calcium carbonate  1 tablet Oral Daily    gabapentin  100 mg Oral Daily    pantoprazole  40 mg Oral Daily    piperacillin-tazobactam 4.5 g in dextrose 5 % 100 mL IVPB (ready to mix system)  4.5 g Intravenous Q8H     Continuous Infusions:   PRN Meds:.acetaminophen, dextrose 50%, dextrose 50%, glucagon (human recombinant), glucose, glucose, hydrocodone-acetaminophen 5-325mg, HYDROmorphone, insulin aspart    Allergies:  Ace inhibitors; Betadine [povidone-iodine]; Dye; and Gentamicin    Vital Signs:  Temp Readings from Last 3 Encounters:   09/24/17 98.1 °F (36.7 °C)       Pulse Readings from Last 3 Encounters:   09/24/17 67       BP Readings from Last 3 Encounters:   09/24/17 (!) 110/55        Weight:  Wt Readings from Last 3 Encounters:   09/22/17 123.4 kg (272 lb)       Review of Systems:  Review of Systems - All 14 systems reviewed and negative, except as noted in HPI    Physical Exam:    Constitutional: NAD  Neuro: No asterixis  Psych: Calm  EENT: NCAT, anicteric sclera   Neck:  supple  Cards: No rub  Lungs: clear to ausculation  GI:  Pos bowel sounds, no hsm, NTND   MSK:  WNWD  Skin: no purpura, rashes       Results:  Lab Results   Component Value Date     (L) 09/24/2017    K 3.7 09/24/2017    CL 98 09/24/2017    CO2 24 09/24/2017    BUN 33 (H) 09/24/2017    CREATININE 7.8 (H) 09/24/2017    CALCIUM 7.3 (L) 09/24/2017    ANIONGAP 12 09/24/2017    ESTGFRAFRICA 6 (A) 09/24/2017    EGFRNONAA 5 (A) 09/24/2017       Lab Results   Component Value Date    CALCIUM 7.3 (L) 09/24/2017    PHOS 2.5 (L) 09/24/2017         Recent Labs  Lab 09/24/17  0510   WBC 15.80*   RBC 3.00*   HGB 9.1*   HCT 27.2*      MCV 91   MCH 30.5   MCHC 33.6       I have personally reviewed pertinent radiological imaging and reports.

## 2017-09-24 NOTE — PROGRESS NOTES
"Hospital Medicine    CC: left leg cellulitis    HPI 72 y.o. female seen and examined with no acute events. S/P I and d this morning which was tolerated well.       Past Medical History:   Diagnosis Date    Arthritis     CHF (congestive heart failure)     Diabetes mellitus     Encounter for blood transfusion     Hypertension          Review of Systems  General ROS: negative for chills, fever or weight loss; positive for fatigue  Cardiovascular ROS: no chest pain or dyspnea on exertion  Gastrointestinal ROS: no abdominal pain, change in bowel habits, or black/ bloody stools    Physical Examination  BP (!) 110/55   Pulse 67   Temp 98.1 °F (36.7 °C)   Resp 16   Ht 5' 7" (1.702 m)   Wt 123.4 kg (272 lb)   LMP  (LMP Unknown)   SpO2 96%   Breastfeeding? No   BMI 42.60 kg/m²   General appearance: alert, cooperative, no distress  HENT: Normocephalic, atraumatic, neck symmetrical, no nasal discharge   Lungs: clear to auscultation bilaterally, no dullness to percussion bilaterally  Heart: regular rate and rhythm without rub; no displacement of the PMI   Abdomen: soft, non-tender; bowel sounds normoactive; no organomegaly  Extremities: extremities symmetric; no clubbing, cyanosis; right leg with erythema and edema  Neurologic: Alert and oriented X 3, normal strength, normal coordination and gait    Labs:  Lab Results   Component Value Date    WBC 15.80 (H) 09/24/2017    HGB 9.1 (L) 09/24/2017    HCT 27.2 (L) 09/24/2017    MCV 91 09/24/2017     09/24/2017         Imaging: reviewed    Assessment and Plan:    Active Hospital Problems     Diagnosis   POA    Cellulitis and abscess of right leg [L03.115, L02.415]  Keep extremity elevated.   Continue IV Zosyn and Vancomycin. Vancomycin doing per pharmacy.  Follow wound culture results.  Now s/p I and D      Yes    ESRD (end stage renal disease) [N18.6]  HD today  Monitor BUN/SCr.  Monitor I/Os.  Monitor electrolytes.   Yes    Morbid obesity [E66.01]  Body mass " index is 42.6 kg/m². Morbid obesity complicates all aspects of disease management from diagnostic modalities to treatment. Weight loss encouraged and health benefits explained to patient.     DM-2  Check blood glucose level q AC/HS.  Use Novolog Insulin Sliding Scale as needed.   Continue American Diabetic Association 1800 Kcal diet.   Yes                VTE Risk Mitigation          Ordered     SCD's given HD patient

## 2017-09-24 NOTE — PLAN OF CARE
09/24/17 0827   Patient Assessment/Suction   Level of Consciousness (AVPU) alert   Respiratory Effort Normal;Unlabored   PRE-TX-O2-ETCO2   O2 Device (Oxygen Therapy) room air   SpO2 100 %   Pulse Oximetry Type Intermittent   $ Pulse Oximetry - Multiple Charge Pulse Oximetry - Multiple

## 2017-09-24 NOTE — OP NOTE
Incision and drainage right thigh abscess Procedure Note    Date of procedure:   09/24/2017    Indications: abscess    Pre-operative Diagnosis: abscess    Post-operative Diagnosis: Same    Surgeon: Danny Stack MD    Assistants: none    Anesthesia: General endotracheal anesthesia    ASA Class: 3    Procedure Details   The patient was seen in the Holding Room. The risks, benefits, complications, treatment options, and expected outcomes were discussed with the patient. The possibilities of reaction to medication, pulmonary aspiration, perforation of viscus, bleeding, recurrent infection, the need for additional procedures, failure to diagnose a condition, and creating a complication requiring transfusion or operation were discussed with the patient. The patient concurred with the proposed plan, giving informed consent. The site of surgery properly noted/marked. The patient was taken to Operating Room 2 identified as Isabella Boone and the procedure verified as I and D abscess. A Time Out was held and the above information confirmed.    Full general anesthesia was induced with orotracheal intubation. The patient was prepped and draped in a supine position, legs frogged.  Appropriate antibiotics were given intravenously. Arms were out.      An incision was made transversely across abscess cavity and carried for almost 10 cm. The abscess cavity was immediately seen with copoious pus which was cultured.  The cavity extended 10 cm deep.  The wound was cleaned.  Debridement with cautery of necrotic tissue was undertaken, loculations broken bluntly.  NO tracking was seen.  One small vein required suture ligation.  Hemostasis obtained.    Wound was packed with saline soaked olga.  Bandages placed.    Instrument, sponge, and needle counts were correct prior to wound closure and at the conclusion of the case.     Findings:  Large abscess    Estimated Blood Loss: 25.0 cc    Drains: none    Total IV Fluids: see anesthesia  note    Specimens: abscess cavity pus    Implants: none    Complications:  None; patient tolerated the procedure well.    Disposition: PACU - hemodynamically stable.    Condition: stable    Attending Attestation: I was present and scrubbed for the entire procedure.

## 2017-09-24 NOTE — PLAN OF CARE
Problem: Patient Care Overview  Goal: Plan of Care Review  Outcome: Ongoing (interventions implemented as appropriate)  Plan of care reviewed with patient, patient verbalized understanding. Safety maintained throughout shift.   Pt remained free of fall/trauma. Vs stable. Patient had an I&D today. Dressing CDI. SCD's on. Blood sugars checked before meals and covered with insulin prn. Patient using IS every hour. Tolerating diet. Tele box 8700 intact running SR. Bed low, call light in reach. Will continue to monitor patient.

## 2017-09-24 NOTE — BRIEF OP NOTE
[facility]  Brief Operative Note    SUMMARY     Surgery Date: 9/24/2017     Surgeon(s) and Role:     * Danny Stack MD - Primary    Assisting Surgeon: None    Pre-op Diagnosis:  Cellulitis and abscess of right leg [L03.115, L02.415]    Post-op Diagnosis:  Post-Op Diagnosis Codes:     * Cellulitis and abscess of right leg [L03.115, L02.415]    Procedure(s) (LRB):  INCISION AND DRAINAGE (I & D)-LEG (Right)    Anesthesia: General    Description of Procedure: incision and drainage abscess    Description of the findings of the procedure: copious pus, large abscess cavity    Estimated Blood Loss: 25.0 cc         Specimens:   Specimen (12h ago through future)    None

## 2017-09-24 NOTE — PLAN OF CARE
SW met w/ pt for assessment.  Pt lives w/ adult dtr, Ezequiel Rivas.  Pt denies current HH, has dialysis MWF @ [facility].       09/24/17 3369   Discharge Assessment   Assessment Type Discharge Planning Assessment   Confirmed/corrected address and phone number on facesheet? Yes   Assessment information obtained from? Patient   Prior to hospitilization cognitive status: Alert/Oriented   Prior to hospitalization functional status: Independent   Current cognitive status: Alert/Oriented   Current Functional Status: Independent   Lives With child(philip), adult   Able to Return to Prior Arrangements yes   Is patient able to care for self after discharge? Unable to determine at this time (comments)   Who are your caregiver(s) and their phone number(s)? dtr: Ezequiel Rivas  [phone]   Patient's perception of discharge disposition home or selfcare   Readmission Within The Last 30 Days no previous admission in last 30 days   Patient currently being followed by outpatient case management? No   Patient currently receives any other outside agency services? No   Equipment Currently Used at Home cane, straight   Do you have any problems affording any of your prescribed medications? No   Is the patient taking medications as prescribed? yes   Does the patient have transportation home? Yes   Transportation Available family or friend will provide  (dtr Ezequiel to transport home)   Dialysis Name and Scheduled days [facility] M, W, F   Does the patient receive services at the [facility]? No   Discharge Plan A Home with family   Discharge Plan B Home with family   Patient/Family In Agreement With Plan yes

## 2017-09-24 NOTE — ANESTHESIA POSTPROCEDURE EVALUATION
"Anesthesia Post Evaluation    Patient: Isabella Boone    Procedure(s) Performed: Procedure(s) (LRB):  INCISION AND DRAINAGE (I & D)-LEG (Right)    Final Anesthesia Type: general  Patient location during evaluation: PACU  Patient participation: Yes- Able to Participate  Level of consciousness: awake and alert  Post-procedure vital signs: reviewed and stable  Pain management: adequate  Airway patency: patent  PONV status at discharge: No PONV  Anesthetic complications: no      Cardiovascular status: hemodynamically stable  Respiratory status: unassisted and room air  Hydration status: euvolemic  Follow-up not needed.        Visit Vitals  BP (!) 103/50   Pulse 78   Temp 36.4 °C (97.6 °F) (Temporal)   Resp 16   Ht 5' 7" (1.702 m)   Wt 123.4 kg (272 lb)   LMP  (LMP Unknown)   SpO2 100%   Breastfeeding? No   BMI 42.60 kg/m²       Pain/Yanique Score: Pain Assessment Performed: Yes (9/24/2017 12:20 PM)  Presence of Pain: denies (9/24/2017 12:20 PM)  Pain Rating Prior to Med Admin: 2 (9/24/2017 12:20 PM)  Pain Rating Post Med Admin: 0 (9/24/2017 12:19 AM)  Yanique Score: 9 (9/24/2017 12:20 PM)      "

## 2017-09-24 NOTE — TRANSFER OF CARE
"Anesthesia Transfer of Care Note    Patient: Isabella Boone    Procedure(s) Performed: Procedure(s) (LRB):  INCISION AND DRAINAGE (I & D)-LEG (Right)    Patient location: PACU    Anesthesia Type: general    Transport from OR: Transported from OR on 2-3 L/min O2 by NC with adequate spontaneous ventilation    Post pain: adequate analgesia    Post assessment: no apparent anesthetic complications    Post vital signs: stable    Level of consciousness: awake and alert    Nausea/Vomiting: no nausea/vomiting    Complications: none    Transfer of care protocol was followed      Last vitals:   Visit Vitals  BP (!) 103/50   Pulse 78   Temp 36.6 °C (97.9 °F) (Oral)   Resp 16   Ht 5' 7" (1.702 m)   Wt 123.4 kg (272 lb)   LMP  (LMP Unknown)   SpO2 100%   Breastfeeding? No   BMI 42.60 kg/m²     "

## 2017-09-25 PROBLEM — L02.91 ABSCESS: Status: ACTIVE | Noted: 2017-09-25

## 2017-09-25 LAB
ANION GAP SERPL CALC-SCNC: 14 MMOL/L
BASOPHILS # BLD AUTO: 0.1 K/UL
BASOPHILS NFR BLD: 0.4 %
BUN SERPL-MCNC: 40 MG/DL
CALCIUM SERPL-MCNC: 7 MG/DL
CHLORIDE SERPL-SCNC: 98 MMOL/L
CO2 SERPL-SCNC: 23 MMOL/L
CREAT SERPL-MCNC: 9 MG/DL
DIFFERENTIAL METHOD: ABNORMAL
EOSINOPHIL # BLD AUTO: 0.5 K/UL
EOSINOPHIL NFR BLD: 3.8 %
ERYTHROCYTE [DISTWIDTH] IN BLOOD BY AUTOMATED COUNT: 14.1 %
EST. GFR  (AFRICAN AMERICAN): 5 ML/MIN/1.73 M^2
EST. GFR  (NON AFRICAN AMERICAN): 4 ML/MIN/1.73 M^2
GLUCOSE SERPL-MCNC: 155 MG/DL
HCT VFR BLD AUTO: 26.5 %
HGB BLD-MCNC: 9.1 G/DL
LYMPHOCYTES # BLD AUTO: 1 K/UL
LYMPHOCYTES NFR BLD: 6.7 %
MAGNESIUM SERPL-MCNC: 1.8 MG/DL
MCH RBC QN AUTO: 31.1 PG
MCHC RBC AUTO-ENTMCNC: 34.3 G/DL
MCV RBC AUTO: 91 FL
MONOCYTES # BLD AUTO: 1.1 K/UL
MONOCYTES NFR BLD: 7.9 %
NEUTROPHILS # BLD AUTO: 11.7 K/UL
NEUTROPHILS NFR BLD: 81.2 %
PHOSPHATE SERPL-MCNC: 3.5 MG/DL
PLATELET # BLD AUTO: 314 K/UL
PMV BLD AUTO: 9.1 FL
POCT GLUCOSE: 170 MG/DL (ref 70–110)
POCT GLUCOSE: 182 MG/DL (ref 70–110)
POCT GLUCOSE: 219 MG/DL (ref 70–110)
POCT GLUCOSE: 240 MG/DL (ref 70–110)
POTASSIUM SERPL-SCNC: 3.8 MMOL/L
RBC # BLD AUTO: 2.92 M/UL
SODIUM SERPL-SCNC: 135 MMOL/L
WBC # BLD AUTO: 14.4 K/UL

## 2017-09-25 PROCEDURE — 97162 PT EVAL MOD COMPLEX 30 MIN: CPT

## 2017-09-25 PROCEDURE — 63600175 PHARM REV CODE 636 W HCPCS: Performed by: INTERNAL MEDICINE

## 2017-09-25 PROCEDURE — 84100 ASSAY OF PHOSPHORUS: CPT

## 2017-09-25 PROCEDURE — 94761 N-INVAS EAR/PLS OXIMETRY MLT: CPT

## 2017-09-25 PROCEDURE — 25000003 PHARM REV CODE 250: Performed by: EMERGENCY MEDICINE

## 2017-09-25 PROCEDURE — 97116 GAIT TRAINING THERAPY: CPT

## 2017-09-25 PROCEDURE — 85025 COMPLETE CBC W/AUTO DIFF WBC: CPT

## 2017-09-25 PROCEDURE — 25000003 PHARM REV CODE 250: Performed by: INTERNAL MEDICINE

## 2017-09-25 PROCEDURE — 80100016 HC MAINTENANCE HEMODIALYSIS

## 2017-09-25 PROCEDURE — 12000002 HC ACUTE/MED SURGE SEMI-PRIVATE ROOM

## 2017-09-25 PROCEDURE — 36415 COLL VENOUS BLD VENIPUNCTURE: CPT

## 2017-09-25 PROCEDURE — 99232 SBSQ HOSP IP/OBS MODERATE 35: CPT | Mod: ,,, | Performed by: INTERNAL MEDICINE

## 2017-09-25 PROCEDURE — 97530 THERAPEUTIC ACTIVITIES: CPT

## 2017-09-25 PROCEDURE — 83735 ASSAY OF MAGNESIUM: CPT

## 2017-09-25 PROCEDURE — 80048 BASIC METABOLIC PNL TOTAL CA: CPT

## 2017-09-25 RX ORDER — HEPARIN SODIUM 5000 [USP'U]/ML
5000 INJECTION, SOLUTION INTRAVENOUS; SUBCUTANEOUS
Status: DISCONTINUED | OUTPATIENT
Start: 2017-09-25 | End: 2017-09-27 | Stop reason: HOSPADM

## 2017-09-25 RX ADMIN — INSULIN ASPART 1 UNITS: 100 INJECTION, SOLUTION INTRAVENOUS; SUBCUTANEOUS at 10:09

## 2017-09-25 RX ADMIN — PIPERACILLIN SODIUM AND TAZOBACTAM SODIUM 4.5 G: 4; .5 INJECTION, POWDER, FOR SOLUTION INTRAVENOUS at 06:09

## 2017-09-25 RX ADMIN — HYDROCODONE BITARTRATE AND ACETAMINOPHEN 1 TABLET: 5; 325 TABLET ORAL at 07:09

## 2017-09-25 RX ADMIN — GABAPENTIN 100 MG: 100 CAPSULE ORAL at 08:09

## 2017-09-25 RX ADMIN — PIPERACILLIN SODIUM AND TAZOBACTAM SODIUM 4.5 G: 4; .5 INJECTION, POWDER, LYOPHILIZED, FOR SOLUTION INTRAVENOUS at 08:09

## 2017-09-25 RX ADMIN — CALCIUM CARBONATE (ANTACID) CHEW TAB 500 MG 500 MG: 500 CHEW TAB at 08:09

## 2017-09-25 RX ADMIN — PANTOPRAZOLE SODIUM 40 MG: 40 TABLET, DELAYED RELEASE ORAL at 08:09

## 2017-09-25 RX ADMIN — EPOETIN ALFA 5000 UNITS: 20000 SOLUTION INTRAVENOUS; SUBCUTANEOUS at 02:09

## 2017-09-25 RX ADMIN — HYDROCODONE BITARTRATE AND ACETAMINOPHEN 1 TABLET: 5; 325 TABLET ORAL at 04:09

## 2017-09-25 NOTE — PLAN OF CARE
Problem: Patient Care Overview  Goal: Plan of Care Review  PT eval and treat completed. Pt ambulated 150ft with RW and up in chair with all needs within reach

## 2017-09-25 NOTE — PROGRESS NOTES
"Progress Note  Hospital Medicine  Patient Name:Isabella Boone  MRN:  [id number]  Patient Class: IP- Inpatient  Admit Date: 9/22/2017  Length of Stay: 3 days  Expected Discharge Date:   Attending Physician: Joce Barney MD  Primary Care Provider:  Primary Doctor No    SUBJECTIVE:     Principal Problem: Right thigh abscess s/p I+D  Initial history of present illness: Patient is a 72 y.o. female admitted to Hospitalist Service from [facility] Emergency Room with complaint of profound weakness. Patient reportedly has past medical history significant for morbid obesity, ESRD (on HD - M/W/F at [facility]). Patient c/o gradual onset of fatigue that has been constant for the past couple of days. Associated with nausea, decreased appetite and "feeling hot". No reported fevers. Pt is not drinking fluids. Denied any vomiting, diarrhea or abdominal pain. Pt also reported a rash to the right inner thigh with sharp pain and drainage for 1 week. Her rash rubs when walking and increases pain. No recent confusion or LOC. Pt was seen at ER 5 days ago for headache, ear pain and runny nose. Patient denied chest pain, shortness of breath, abdominal pain, nausea, vomiting, headache, vision changes, focal neuro-deficits, cough or fever. In the ER, noted to have right medial thigh and an abscess was I+D performed and wound packing done by the ER doctor.    PMH/PSH/SH/FH/Meds: reviewed.    Symptoms/Review of Systems: Patient underwent right thigh abscess I+D yesterday by Dr. Menendez. No shortness of breath, cough, chest pain or headache, fever or abdominal pain.     Diet:  Adequate intake.    Activity level: Up with assist   Pain:  Patient reports no pain.       OBJECTIVE:   Vital Signs (Most Recent):      Temp: 97.9 °F (36.6 °C) (09/25/17 0402)  Pulse: 70 (09/25/17 0402)  Resp: 16 (09/25/17 0402)  BP: (!) 112/53 (09/25/17 0402)  SpO2: 100 % (09/25/17 0758)       Vital Signs Range (Last 24H):  Temp:  [96.9 °F (36.1 °C)-98.1 °F " (36.7 °C)]   Pulse:  [65-77]   Resp:  [7-24]   BP: ()/(44-57)   SpO2:  [89 %-100 %]     Weight: 123.4 kg (272 lb)  Body mass index is 42.6 kg/m².    Intake/Output Summary (Last 24 hours) at 09/25/17 0957  Last data filed at 09/24/17 1600   Gross per 24 hour   Intake              720 ml   Output                0 ml   Net              720 ml     Physical Examination:  General appearance: well developed, appears stated age  Head: normocephalic, atraumatic  Eyes:  conjunctivae/corneas clear. PERRL.  Nose: Nares normal. Septum midline.  Throat: lips, mucosa, and tongue normal; teeth and gums normal, no throat erythema.  Neck: supple, symmetrical, trachea midline, no JVD and thyroid not enlarged, symmetric, no tenderness/mass/nodules  Lungs:  clear to auscultation bilaterally and normal respiratory effort  Chest wall: no tenderness  Heart: regular rate and rhythm, S1, S2 normal, no murmur, click, rub or gallop  Abdomen: soft, non-tender non-distented; bowel sounds normal; no masses,  no organomegaly  Extremities: no cyanosis, clubbing. Trace pitting pedal edema. Left upper extremity fistula with palpable thrill.   Pulses: 2+ and symmetric  Skin: Skin color, texture, turgor normal. Right medial thigh dressing C/D/I.  Lymph nodes: Cervical, supraclavicular, and axillary nodes normal.  Neurologic: Normal strength and tone. No focal numbness or weakness. CNII-XII intact.      CBC:    Recent Labs  Lab 09/23/17  0516 09/24/17  0510 09/25/17  0528   WBC 19.30* 15.80* 14.40*   RBC 2.94* 3.00* 2.92*   HGB 9.1* 9.1* 9.1*   HCT 26.7* 27.2* 26.5*    272 314   MCV 91 91 91   MCH 31.2* 30.5 31.1*   MCHC 34.3 33.6 34.3   BMP    Recent Labs  Lab 09/23/17  0516 09/24/17  0510 09/25/17  0528   * 183* 155*   * 134* 135*   K 3.5 3.7 3.8   CL 98 98 98   CO2 22* 24 23   BUN 50* 33* 40*   CREATININE 10.6* 7.8* 9.0*   CALCIUM 7.0* 7.3* 7.0*   MG 1.6 1.7 1.8      Diagnostic Results:  Microbiology Results (last 7 days)      Procedure Component Value Units Date/Time    Aerobic culture [[id number]] Collected:  09/24/17 1120    Order Status:  Completed Specimen:  Abscess from Leg, Right Updated:  09/25/17 0950     Aerobic Bacterial Culture --     STAPHYLOCOCCUS AUREUS  Many  Susceptibility pending      Culture, Anaerobe [[id number]] Collected:  09/24/17 1120    Order Status:  Completed Specimen:  Abscess from Leg, Right Updated:  09/25/17 0724     Anaerobic Culture Culture in progress               Assessment/Plan:      Cellulitis and abscess of right leg [L03.115, L02.415] with Staph Aureus sp s/p I+D  Keep extremity elevated.   Continue IV Zosyn and Vancomycin. Vancomycin doing per pharmacy.  Follow wound culture results.  Follow Dr. Stack's recommendations.      Yes    ESRD (end stage renal disease) [N18.6]  HD as per nephrology team.  Monitor BUN/SCr.  Monitor I/Os.  Monitor electrolytes.     Yes    Morbid obesity [E66.01]  Body mass index is 42.6 kg/m². Morbid obesity complicates all aspects of disease management from diagnostic modalities to treatment. Weight loss encouraged and health benefits explained to patient.     DM-2  Check blood glucose level q AC/HS.  Use Novolog Insulin Sliding Scale as needed.   Continue American Diabetic Association 1800 Kcal diet.   Yes           VTE Risk Mitigation         Ordered     heparin (porcine) injection 5,000 Units  As needed (PRN)     Route:  Intravenous        09/25/17 0944     Place sequential compression device  Until discontinued      09/24/17 1233     Low Risk of VTE  Once      09/22/17 1534        Joce Barney MD  Department of Hospital Medicine   [facility]

## 2017-09-25 NOTE — PROGRESS NOTES
HD:  Pt stable, tx complete, lines reinfused, needles removed, hemostasis achieved, + thrill and bruit post tx.  Pt tolerated tx well.     NET UF:  1000 mL

## 2017-09-25 NOTE — PROGRESS NOTES
Doing well  Pain controlled   Dressing changed  Continue daily wound care and antibiotics  Will sign off

## 2017-09-25 NOTE — PROGRESS NOTES
Dressing change to inner thigh today due to saturation, repacked and covered, currently in dialysis.

## 2017-09-25 NOTE — PROGRESS NOTES
INPATIENT NEPHROLOGY PROGRESS NOTE  [facility] NEPHROLOGY    Isabella Boone  09/25/2017    Reason for consultation:    esrd    Chief Complaint:   Chief Complaint   Patient presents with    Fatigue     feeling weak and too tired to go to dialysis today, decreased appetite        History of Present Illness:     The patient is a 72 woman admitted with thigh abscess.  She has a h/o esrd, anemia and hpth.  She initially presented with severe fatigue, nausea, hot flashes and poor appetite.  Today she denies n,v,sob, cp, neuro symptoms, urinary or bowel symptoms, or fever.    9/24 sleeping    9/25 VSS, no new complains. Plan for HD later today.    Plan of Care:     Assessment:     ESRD on HD MWF  S/p I and D abscess  Anemia  hypertension    Plan:     1. esrd--mwf hd.    2. Volume/Blood Pressure-uf as tolerated.    3. Electrolytes/Acid Base-3 k bath for now.    4. Bone Mineral Metabolism-outpt activated vit d as needed.    5. Anemia of CKD-eESA with renal replacement therapy as needed.    6. Medications- renal dose for HD      Thank you for allowing us to participate in this patient's care. We will continue to follow.    Medications:  No current facility-administered medications on file prior to encounter.      No current outpatient prescriptions on file prior to encounter.     Scheduled Meds:   calcium carbonate  1 tablet Oral Daily    epoetin cheli (PROCRIT) injection  5,000 Units Intravenous Every Mon, Wed, Fri    gabapentin  100 mg Oral Daily    pantoprazole  40 mg Oral Daily    piperacillin-tazobactam 4.5 g in dextrose 5 % 100 mL IVPB (ready to mix system)  4.5 g Intravenous Q12H     Continuous Infusions:   PRN Meds:.acetaminophen, dextrose 50%, dextrose 50%, glucagon (human recombinant), glucose, glucose, heparin (porcine), hydrocodone-acetaminophen 5-325mg, HYDROmorphone, insulin aspart    Allergies:  Ace inhibitors; Betadine [povidone-iodine]; Dye; and Gentamicin    Vital Signs:  Temp Readings from Last 3  Encounters:   09/25/17 97.5 °F (36.4 °C) (Oral)       Pulse Readings from Last 3 Encounters:   09/25/17 66       BP Readings from Last 3 Encounters:   09/25/17 (!) 95/51       Weight:  Wt Readings from Last 3 Encounters:   09/22/17 123.4 kg (272 lb)       Review of Systems:  Review of Systems - All 14 systems reviewed and negative, except as noted in HPI    Physical Exam   Constitutional: She is oriented to person, place, and time. She appears well-developed and well-nourished.   HENT:   Head: Normocephalic and atraumatic.   Mouth/Throat: Oropharynx is clear and moist.   Eyes: EOM are normal. Pupils are equal, round, and reactive to light. No scleral icterus.   Neck: Neck supple.   Cardiovascular: Normal rate and regular rhythm.    Pulmonary/Chest: Effort normal. No stridor. No respiratory distress.   Abdominal: Soft. She exhibits no distension.   Musculoskeletal: Normal range of motion. She exhibits no edema or deformity.   Neurological: She is alert and oriented to person, place, and time. No cranial nerve deficit.   Skin: Skin is warm and dry. No rash noted. She is not diaphoretic. No erythema.   Psychiatric: She has a normal mood and affect. Her behavior is normal.            Results:  Lab Results   Component Value Date     (L) 09/25/2017    K 3.8 09/25/2017    CL 98 09/25/2017    CO2 23 09/25/2017    BUN 40 (H) 09/25/2017    CREATININE 9.0 (H) 09/25/2017    CALCIUM 7.0 (L) 09/25/2017    ANIONGAP 14 09/25/2017    ESTGFRAFRICA 5 (A) 09/25/2017    EGFRNONAA 4 (A) 09/25/2017       Lab Results   Component Value Date    CALCIUM 7.0 (L) 09/25/2017    PHOS 3.5 09/25/2017         Recent Labs  Lab 09/25/17  0528   WBC 14.40*   RBC 2.92*   HGB 9.1*   HCT 26.5*      MCV 91   MCH 31.1*   MCHC 34.3       I have personally reviewed pertinent radiological imaging and reports.       Addendum: @ 3:14 PM    Seen on HD machine , tolerating well. Next HD on Wed or as needed.    Robert Frey MD

## 2017-09-25 NOTE — PLAN OF CARE
09/25/17 0758   Patient Assessment/Suction   Level of Consciousness (AVPU) alert   Respiratory Effort Normal;Unlabored   All Lung Fields Breath Sounds clear   Rhythm/Pattern, Respiratory unlabored;depth regular;pattern regular   Cough Type good;nonproductive   PRE-TX-O2-ETCO2   O2 Device (Oxygen Therapy) room air   SpO2 100 %   Pulse Oximetry Type Intermittent   $ Pulse Oximetry - Multiple Charge Pulse Oximetry - Multiple

## 2017-09-25 NOTE — PT/OT/SLP EVAL
Physical Therapy  Evaluation    Isabella Boone   MRN: [id number]   Admitting Diagnosis: <principal problem not specified>    PT Received On: 09/25/17  PT Start Time: 1048     PT Stop Time: 1112    PT Total Time (min): 24 min       Billable Minutes:  Evaluation 8, Gait Training8 and Therapeutic Activity 8    Diagnosis: <principal problem not specified>  Cellulitis/ abscess R thigh with s/p I and D 09-    Past Medical History:   Diagnosis Date    Arthritis     CHF (congestive heart failure)     Diabetes mellitus     Encounter for blood transfusion     Hypertension       Past Surgical History:   Procedure Laterality Date    EYE SURGERY      cataracts    HYSTERECTOMY      juanito        Referring physician:   Date referred to PT: 09-    General Precautions: Standard, fall  Orthopedic Precautions: N/A   Braces: N/A            Patient History:  Lives With: child(philip), adult  Living Arrangements: house  Home Accessibility: stairs to enter home  Home Layout: Able to live on 1st floor  Number of Stairs to Enter Home: 2  Stair Railings at Home: none  Transportation Available: family or friend will provide, agency transportation (attends dialysis MWF)  Equipment Currently Used at Home: cane, straight  DME owned (not currently used):     Previous Level of Function:  Ambulation Skills: needs device  Transfer Skills: independent  ADL Skills: needs device    Subjective:  Communicated with nurse Harris prior to session.  Pt seen supine in bed, agreeable to PT  Chief Complaint: weakness, R inner thigh wound pain  Nurse harris stated just completed dressing change but no tape applied- PT applied ace wrap to reinforce dressing- ok with nurse harris  Patient goals: get well    Pain/Comfort  Pain Rating 1: 8/10  Location - Side 1: Right  Location 1: thigh  Pain Addressed 1: Reposition, Pre-medicate for activity      Objective:         Cognitive Exam:  Oriented to: Person, Place, Time and Situation    Follows  Commands/attention: Follows multistep  commands  Communication: clear/fluent  Safety awareness/insight to disability: intact    Physical Exam:  Postural examination/scapula alignment: Rounded shoulder and Head forward    Skin integrity: Wound R inner thigh- s/p I and D  Edema: Mild R thigh    Sensation:   Intact    Upper Extremity Range of Motion:  Right Upper Extremity: WFL  Left Upper Extremity: WFL    Upper Extremity Strength:  Right Upper Extremity: WFL  Left Upper Extremity: WFL    Lower Extremity Range of Motion:  Right Lower Extremity: Deficits: decreased hip ROM- pain  Left Lower Extremity: WFL    Lower Extremity Strength:  Right Lower Extremity: 4-/5  Left Lower Extremity: 4-/5     Fine motor coordination:      Gross motor coordination:     Functional Mobility:  Bed Mobility:  Rolling/Turning Right: Minimum assistance  Supine to Sit: Minimum Assistance    Transfers:  Sit <> Stand Assistance: Minimum Assistance  Sit <> Stand Assistive Device: Rolling Walker  Bed <> Chair Technique: Stand Pivot  Bed <> Chair Assistance: Minimum Assistance  Bed <> Chair Assistive Device: Rolling Walker    Gait:   Gait Distance: 150 ft  Assistance 1: Minimum assistance  Gait Assistive Device: Rolling walker  Gait Pattern: 3-point gait  Gait Deviation(s): decreased goran, increased time in double stance, decreased velocity of limb motion, decreased step length, decreased stride length          Balance:   Static Sit: GOOD-: Takes MODERATE challenges from all directions but inconsistently  Dynamic Sit: GOOD-: Maintains balance through MODERATE excursions of active trunk movement,     Static Stand: FAIR: Maintains without assist but unable to take challenges  Dynamic stand: POOR: N/A    Therapeutic Activities and Exercises:  Ace applied R thigh to reinforce dressing  EOB sitting min assist  Gait with RW to hallways with chair following and extra time  OOB to chair post PT    AM-PAC 6 CLICK MOBILITY  How much help from another  person does this patient currently need?   1 = Unable, Total/Dependent Assistance  2 = A lot, Maximum/Moderate Assistance  3 = A little, Minimum/Contact Guard/Supervision  4 = None, Modified [place]/Independent          AM-PAC Raw Score CMS G-Code Modifier Level of Impairment Assistance   6 % Total / Unable   7 - 9 CM 80 - 100% Maximal Assist   10 - 14 CL 60 - 80% Moderate Assist   15 - 19 CK 40 - 60% Moderate Assist   20 - 22 CJ 20 - 40% Minimal Assist   23 CI 1-20% SBA / CGA   24 CH 0% Independent/ Mod I     Patient left up in chair with all lines intact, call button in reach and nurse Noel notified.    Assessment:   Isabella Boone is a 72 y.o. female with a medical diagnosis of <principal problem not specified> and presents with obesity, R inner thigh wound, deconditioning. Previously home with daughter and alone at daytime as daughter works. Pt was functional and amb with cane. Pt to benefit from continued therapies.    Rehab identified problem list/impairments: Rehab identified problem list/impairments: weakness, impaired endurance, impaired functional mobilty, gait instability, impaired self care skills, pain, impaired skin    Rehab potential is fair.    Activity tolerance: Fair    Discharge recommendations: Discharge Facility/Level Of Care Needs: LTACH (long term acute care hospital)     Barriers to discharge: Barriers to Discharge: Decreased caregiver support    Equipment recommendations: Equipment Needed After Discharge: walker, rolling     GOALS:    Physical Therapy Goals        Problem: Physical Therapy Goal    Goal Priority Disciplines Outcome Goal Variances Interventions   Physical Therapy Goal    High PT/OT, PT      Description:  Goals to be met by: 10-     Patient will increase functional independence with mobility by performin. Supine to sit with Contact Guard Assistance  2. Sit to stand transfer with Contact Guard Assistance  3. Bed to chair transfer with Contact Guard  Assistance using Rolling Walker  4. Gait  x 240 feet with Minimal Assistance using Rolling Walker.   5. Lower extremity exercise program x20 reps per handout, with assistance as needed                      PLAN:    Patient to be seen 6 x/week to address the above listed problems via gait training, therapeutic activities, therapeutic exercises  Plan of Care expires: 10/06/17  Plan of Care reviewed with: patient          Ngoc Solisti, PT  09/25/2017

## 2017-09-26 LAB
ANION GAP SERPL CALC-SCNC: 14 MMOL/L
BACTERIA SPEC AEROBE CULT: NORMAL
BASOPHILS # BLD AUTO: 0 K/UL
BASOPHILS NFR BLD: 0.1 %
BUN SERPL-MCNC: 22 MG/DL
CALCIUM SERPL-MCNC: 7.3 MG/DL
CHLORIDE SERPL-SCNC: 99 MMOL/L
CO2 SERPL-SCNC: 24 MMOL/L
CREAT SERPL-MCNC: 6.5 MG/DL
DIFFERENTIAL METHOD: ABNORMAL
EOSINOPHIL # BLD AUTO: 0.5 K/UL
EOSINOPHIL NFR BLD: 3.9 %
ERYTHROCYTE [DISTWIDTH] IN BLOOD BY AUTOMATED COUNT: 13.9 %
EST. GFR  (AFRICAN AMERICAN): 7 ML/MIN/1.73 M^2
EST. GFR  (NON AFRICAN AMERICAN): 6 ML/MIN/1.73 M^2
GLUCOSE SERPL-MCNC: 175 MG/DL
HCT VFR BLD AUTO: 27.5 %
HGB BLD-MCNC: 9.2 G/DL
LYMPHOCYTES # BLD AUTO: 0.8 K/UL
LYMPHOCYTES NFR BLD: 6.6 %
MAGNESIUM SERPL-MCNC: 1.6 MG/DL
MCH RBC QN AUTO: 30.7 PG
MCHC RBC AUTO-ENTMCNC: 33.6 G/DL
MCV RBC AUTO: 91 FL
MONOCYTES # BLD AUTO: 1.1 K/UL
MONOCYTES NFR BLD: 9.3 %
NEUTROPHILS # BLD AUTO: 9.8 K/UL
NEUTROPHILS NFR BLD: 80.1 %
PHOSPHATE SERPL-MCNC: 3.1 MG/DL
PLATELET # BLD AUTO: 355 K/UL
PMV BLD AUTO: 8.7 FL
POCT GLUCOSE: 179 MG/DL (ref 70–110)
POCT GLUCOSE: 220 MG/DL (ref 70–110)
POCT GLUCOSE: 273 MG/DL (ref 70–110)
POTASSIUM SERPL-SCNC: 3.7 MMOL/L
RBC # BLD AUTO: 3.01 M/UL
SODIUM SERPL-SCNC: 137 MMOL/L
WBC # BLD AUTO: 12.3 K/UL

## 2017-09-26 PROCEDURE — 36415 COLL VENOUS BLD VENIPUNCTURE: CPT

## 2017-09-26 PROCEDURE — 83735 ASSAY OF MAGNESIUM: CPT

## 2017-09-26 PROCEDURE — 80048 BASIC METABOLIC PNL TOTAL CA: CPT

## 2017-09-26 PROCEDURE — 25000003 PHARM REV CODE 250: Performed by: INTERNAL MEDICINE

## 2017-09-26 PROCEDURE — 99232 SBSQ HOSP IP/OBS MODERATE 35: CPT | Mod: ,,, | Performed by: INTERNAL MEDICINE

## 2017-09-26 PROCEDURE — 63600175 PHARM REV CODE 636 W HCPCS: Performed by: INTERNAL MEDICINE

## 2017-09-26 PROCEDURE — 12000002 HC ACUTE/MED SURGE SEMI-PRIVATE ROOM

## 2017-09-26 PROCEDURE — 97116 GAIT TRAINING THERAPY: CPT

## 2017-09-26 PROCEDURE — 94760 N-INVAS EAR/PLS OXIMETRY 1: CPT

## 2017-09-26 PROCEDURE — 84100 ASSAY OF PHOSPHORUS: CPT

## 2017-09-26 PROCEDURE — 94761 N-INVAS EAR/PLS OXIMETRY MLT: CPT

## 2017-09-26 PROCEDURE — 97110 THERAPEUTIC EXERCISES: CPT

## 2017-09-26 PROCEDURE — 25000003 PHARM REV CODE 250: Performed by: EMERGENCY MEDICINE

## 2017-09-26 PROCEDURE — 85025 COMPLETE CBC W/AUTO DIFF WBC: CPT

## 2017-09-26 RX ADMIN — HYDROCODONE BITARTRATE AND ACETAMINOPHEN 1 TABLET: 5; 325 TABLET ORAL at 04:09

## 2017-09-26 RX ADMIN — HYDROCODONE BITARTRATE AND ACETAMINOPHEN 1 TABLET: 5; 325 TABLET ORAL at 08:09

## 2017-09-26 RX ADMIN — INSULIN ASPART 3 UNITS: 100 INJECTION, SOLUTION INTRAVENOUS; SUBCUTANEOUS at 12:09

## 2017-09-26 RX ADMIN — INSULIN ASPART 1 UNITS: 100 INJECTION, SOLUTION INTRAVENOUS; SUBCUTANEOUS at 08:09

## 2017-09-26 RX ADMIN — PANTOPRAZOLE SODIUM 40 MG: 40 TABLET, DELAYED RELEASE ORAL at 08:09

## 2017-09-26 RX ADMIN — PIPERACILLIN SODIUM AND TAZOBACTAM SODIUM 4.5 G: 4; .5 INJECTION, POWDER, LYOPHILIZED, FOR SOLUTION INTRAVENOUS at 08:09

## 2017-09-26 RX ADMIN — INSULIN ASPART 2 UNITS: 100 INJECTION, SOLUTION INTRAVENOUS; SUBCUTANEOUS at 05:09

## 2017-09-26 RX ADMIN — CALCIUM CARBONATE (ANTACID) CHEW TAB 500 MG 500 MG: 500 CHEW TAB at 08:09

## 2017-09-26 RX ADMIN — GABAPENTIN 100 MG: 100 CAPSULE ORAL at 08:09

## 2017-09-26 NOTE — PLAN OF CARE
Problem: Patient Care Overview  Goal: Plan of Care Review  Outcome: Ongoing (interventions implemented as appropriate)  Pt alert and oriented. Ambulatory with assist. Wound care done, and dressing changed. No injuries or falls this shift. No pain or nausea. Blood glucose checked and covered. Will continue to monitor.

## 2017-09-26 NOTE — PLAN OF CARE
09/26/17 0754   Patient Assessment/Suction   Level of Consciousness (AVPU) alert   Respiratory Effort Normal;Unlabored   All Lung Fields Breath Sounds clear   Rhythm/Pattern, Respiratory unlabored;pattern regular;depth regular   Cough Type good;nonproductive   PRE-TX-O2-ETCO2   O2 Device (Oxygen Therapy) room air   SpO2 100 %   Pulse Oximetry Type Intermittent   $ Pulse Oximetry - Multiple Charge Pulse Oximetry - Multiple

## 2017-09-26 NOTE — PROGRESS NOTES
"Progress Note  Hospital Medicine  Patient Name:Isabella Boone  MRN:  [id number]  Patient Class: IP- Inpatient  Admit Date: 9/22/2017  Length of Stay: 4 days  Expected Discharge Date:   Attending Physician: Joce Barney MD  Primary Care Provider:  Primary Doctor No    SUBJECTIVE:     Principal Problem: Right thigh abscess s/p I+D  Initial history of present illness: Patient is a 72 y.o. female admitted to Hospitalist Service from [facility] Emergency Room with complaint of profound weakness. Patient reportedly has past medical history significant for morbid obesity, ESRD (on HD - M/W/F at [facility]). Patient c/o gradual onset of fatigue that has been constant for the past couple of days. Associated with nausea, decreased appetite and "feeling hot". No reported fevers. Pt is not drinking fluids. Denied any vomiting, diarrhea or abdominal pain. Pt also reported a rash to the right inner thigh with sharp pain and drainage for 1 week. Her rash rubs when walking and increases pain. No recent confusion or LOC. Pt was seen at ER 5 days ago for headache, ear pain and runny nose. Patient denied chest pain, shortness of breath, abdominal pain, nausea, vomiting, headache, vision changes, focal neuro-deficits, cough or fever. In the ER, noted to have right medial thigh and an abscess was I+D performed and wound packing done by the ER doctor.    PMH/PSH/SH/FH/Meds: reviewed.    Symptoms/Review of Systems: s/p right thigh abscess I+D. No shortness of breath, cough, chest pain or headache, fever or abdominal pain.     Diet:  Adequate intake.    Activity level: Up with assist   Pain:  Patient reports no pain.       OBJECTIVE:   Vital Signs (Most Recent):      Temp: 97.9 °F (36.6 °C) (09/26/17 0742)  Pulse: 72 (09/26/17 0742)  Resp: 18 (09/26/17 0742)  BP: (!) 115/56 (09/26/17 0742)  SpO2: 100 % (09/26/17 0742)       Vital Signs Range (Last 24H):  Temp:  [97.5 °F (36.4 °C)-98.5 °F (36.9 °C)]   Pulse:  [66-82]   Resp:  " [16-18]   BP: ()/(49-66)   SpO2:  [95 %-100 %]     Weight: 123.4 kg (272 lb)  Body mass index is 42.6 kg/m².    Intake/Output Summary (Last 24 hours) at 09/26/17 0855  Last data filed at 09/25/17 2005   Gross per 24 hour   Intake             1000 ml   Output             1500 ml   Net             -500 ml     Physical Examination:  General appearance: well developed, appears stated age  Head: normocephalic, atraumatic  Eyes:  conjunctivae/corneas clear. PERRL.  Nose: Nares normal. Septum midline.  Throat: lips, mucosa, and tongue normal; teeth and gums normal, no throat erythema.  Neck: supple, symmetrical, trachea midline, no JVD and thyroid not enlarged, symmetric, no tenderness/mass/nodules  Lungs:  clear to auscultation bilaterally and normal respiratory effort  Chest wall: no tenderness  Heart: regular rate and rhythm, S1, S2 normal, no murmur, click, rub or gallop  Abdomen: soft, non-tender non-distented; bowel sounds normal; no masses,  no organomegaly  Extremities: no cyanosis, clubbing. Trace pitting pedal edema. Left upper extremity fistula with palpable thrill.   Pulses: 2+ and symmetric  Skin: Skin color, texture, turgor normal. Right medial thigh dressing C/D/I.  Lymph nodes: Cervical, supraclavicular, and axillary nodes normal.  Neurologic: Normal strength and tone. No focal numbness or weakness. CNII-XII intact.      CBC:    Recent Labs  Lab 09/24/17 0510 09/25/17 0528 09/26/17  0538   WBC 15.80* 14.40* 12.30   RBC 3.00* 2.92* 3.01*   HGB 9.1* 9.1* 9.2*   HCT 27.2* 26.5* 27.5*    314 355*   MCV 91 91 91   MCH 30.5 31.1* 30.7   MCHC 33.6 34.3 33.6   BMP    Recent Labs  Lab 09/24/17 0510 09/25/17 0528 09/26/17  0538   * 155* 175*   * 135* 137   K 3.7 3.8 3.7   CL 98 98 99   CO2 24 23 24   BUN 33* 40* 22   CREATININE 7.8* 9.0* 6.5*   CALCIUM 7.3* 7.0* 7.3*   MG 1.7 1.8 1.6      Diagnostic Results:  Microbiology Results (last 7 days)     Procedure Component Value Units  Date/Time    Aerobic culture [[id number]] Collected:  09/24/17 1120    Order Status:  Completed Specimen:  Abscess from Leg, Right Updated:  09/25/17 0950     Aerobic Bacterial Culture --     STAPHYLOCOCCUS AUREUS  Many  Susceptibility pending      Culture, Anaerobe [[id number]] Collected:  09/24/17 1120    Order Status:  Completed Specimen:  Abscess from Leg, Right Updated:  09/25/17 0724     Anaerobic Culture Culture in progress               Assessment/Plan:      Cellulitis and abscess of right leg [L03.115, L02.415] with Staph Aureus sp s/p I+D  Keep extremity elevated.   Continue IV Zosyn and Vancomycin. Vancomycin doing per pharmacy.  Follow wound culture results.  Follow Dr. Stack's recommendations.      Yes    ESRD (end stage renal disease) [N18.6]  HD as per nephrology team.  Monitor BUN/SCr.  Monitor I/Os.  Monitor electrolytes.     Yes    Morbid obesity [E66.01]  Body mass index is 42.6 kg/m². Morbid obesity complicates all aspects of disease management from diagnostic modalities to treatment. Weight loss encouraged and health benefits explained to patient.     DM-2  Check blood glucose level q AC/HS.  Use Novolog Insulin Sliding Scale as needed.   Continue American Diabetic Association 1800 Kcal diet.   Yes           VTE Risk Mitigation         Ordered     heparin (porcine) injection 5,000 Units  As needed (PRN)     Route:  Intravenous        09/25/17 0944     Place sequential compression device  Until discontinued      09/24/17 1233     Low Risk of VTE  Once      09/22/17 1534        Joce Barney MD  Department of Hospital Medicine   [facility]

## 2017-09-26 NOTE — PLAN OF CARE
Problem: Physical Therapy Goal  Goal: Physical Therapy Goal  Goals to be met by: 10-     Patient will increase functional independence with mobility by performin. Supine to sit with Contact Guard Assistance  2. Sit to stand transfer with Contact Guard Assistance  3. Bed to chair transfer with Contact Guard Assistance using Rolling Walker  4. Gait  x 240 feet with Minimal Assistance using Rolling Walker.   5. Lower extremity exercise program x20 reps per handout, with assistance as needed     Outcome: Ongoing (interventions implemented as appropriate)  Ambulated 150' with rw and CGA.

## 2017-09-26 NOTE — PT/OT/SLP PROGRESS
Physical Therapy  Treatment    Isabella Boone   MRN: [id number]   Admitting Diagnosis: <principal problem not specified>    PT Received On: 09/26/17  PT Start Time: 0955     PT Stop Time: 1011    PT Total Time (min): 16 min       Billable Minutes:  Gait [id number] and Therapeutic Exercise 8min    Treatment Type: Treatment  PT/PTA: PTA     PTA Visit Number: 1       General Precautions: Standard, fall  Orthopedic Precautions: N/A   Braces: N/A         Subjective:  Communicated with nurse Gongora prior to session.  Agreed to ambulate.    Pain/Comfort  Pain Rating 1: 5/10  Location - Side 1: Right  Location - Orientation 1: medial  Location 1: thigh    Objective:   Patient found with: peripheral IV    Functional Mobility:  Bed Mobility:        Transfers:  Sit <> Stand Assistance: Minimum Assistance  Sit <> Stand Assistive Device: Rolling Walker    Gait:   Gait Distance: 150'  Assistance 1: Contact Guard Assistance  Gait Assistive Device: Rolling walker  Gait Pattern: 3-point gait  Gait Deviation(s): decreased goran, increased time in double stance, decreased velocity of limb motion, decreased step length, forward lean    Stairs:      Balance:   Static Sit: GOOD-: Takes MODERATE challenges from all directions but inconsistently  Dynamic Sit: GOOD-: Maintains balance through MODERATE excursions of active trunk movement,     Static Stand: FAIR: Maintains without assist but unable to take challenges  Dynamic stand: FAIR: Needs CONTACT GUARD during gait     Therapeutic Activities and Exercises:  Seated in chair. Ambulated 150' with rw and CGA. Therex : TKE's, Hip abd /add/ flexion, AP's at 10 reps each .     AM-PAC 6 CLICK MOBILITY  How much help from another person does this patient currently need?   1 = Unable, Total/Dependent Assistance  2 = A lot, Maximum/Moderate Assistance  3 = A little, Minimum/Contact Guard/Supervision  4 = None, Modified [place]/Independent         AM-PAC Raw Score CMS G-Code Modifier Level  of Impairment Assistance   6 % Total / Unable   7 - 9 CM 80 - 100% Maximal Assist   10 - 14 CL 60 - 80% Moderate Assist   15 - 19 CK 40 - 60% Moderate Assist   20 - 22 CJ 20 - 40% Minimal Assist   23 CI 1-20% SBA / CGA   24 CH 0% Independent/ Mod I     Patient left up in chair with all lines intact, call button in reach and nurse Fransisca notified.    Assessment:  Isabella Boone is a 72 y.o. female with a medical diagnosis of <principal problem not specified> and presents with limited endurance, pain.    Rehab identified problem list/impairments: Rehab identified problem list/impairments: weakness, impaired endurance, impaired functional mobilty, gait instability, impaired self care skills, pain, impaired skin    Rehab potential is good.    Activity tolerance: Good    Discharge recommendations: Discharge Facility/Level Of Care Needs: LTACH (long term acute care hospital)     Barriers to discharge: Barriers to Discharge: Decreased caregiver support    Equipment recommendations: Equipment Needed After Discharge: walker, rolling     GOALS:    Physical Therapy Goals        Problem: Physical Therapy Goal    Goal Priority Disciplines Outcome Goal Variances Interventions   Physical Therapy Goal    High PT/OT, PT Ongoing (interventions implemented as appropriate)     Description:  Goals to be met by: 10-     Patient will increase functional independence with mobility by performin. Supine to sit with Contact Guard Assistance  2. Sit to stand transfer with Contact Guard Assistance  3. Bed to chair transfer with Contact Guard Assistance using Rolling Walker  4. Gait  x 240 feet with Minimal Assistance using Rolling Walker.   5. Lower extremity exercise program x20 reps per handout, with assistance as needed                      PLAN:    Patient to be seen 6 x/week  to address the above listed problems via therapeutic activities, therapeutic exercises  Plan of Care expires: 10/06/17  Plan of Care reviewed  with: patient         Desiree Lopez, PTA  09/26/2017

## 2017-09-26 NOTE — PLAN OF CARE
Problem: Patient Care Overview  Goal: Plan of Care Review  POC reviewed with patient. Verbalized understanding. Patient reports pain treated with medication ordered by Md. Dressing to the right inner thigh clean dry and intact. Blood glucose monitoring during shift.  Patient tolerating a ADA diet well. Bedpan at the bedside. Safety maintained throughout the shift. Turned patient as tolerated. . No new Skin break down.   Bed locked and in lowest position. Call light in reach. Side rails up x2. NON skid socks on when OOB. Patient remained free of falls/ trauma.  Will continue to monitor.

## 2017-09-27 VITALS
DIASTOLIC BLOOD PRESSURE: 59 MMHG | SYSTOLIC BLOOD PRESSURE: 116 MMHG | BODY MASS INDEX: 42.69 KG/M2 | HEART RATE: 70 BPM | OXYGEN SATURATION: 98 % | WEIGHT: 272 LBS | HEIGHT: 67 IN | RESPIRATION RATE: 16 BRPM | TEMPERATURE: 97 F

## 2017-09-27 LAB
ANION GAP SERPL CALC-SCNC: 16 MMOL/L
BASOPHILS # BLD AUTO: 0 K/UL
BASOPHILS NFR BLD: 0.1 %
BUN SERPL-MCNC: 30 MG/DL
CALCIUM SERPL-MCNC: 7.1 MG/DL
CHLORIDE SERPL-SCNC: 99 MMOL/L
CO2 SERPL-SCNC: 23 MMOL/L
CREAT SERPL-MCNC: 8.4 MG/DL
DIFFERENTIAL METHOD: ABNORMAL
EOSINOPHIL # BLD AUTO: 0.5 K/UL
EOSINOPHIL NFR BLD: 4.7 %
ERYTHROCYTE [DISTWIDTH] IN BLOOD BY AUTOMATED COUNT: 14 %
EST. GFR  (AFRICAN AMERICAN): 5 ML/MIN/1.73 M^2
EST. GFR  (NON AFRICAN AMERICAN): 4 ML/MIN/1.73 M^2
GLUCOSE SERPL-MCNC: 146 MG/DL
HCT VFR BLD AUTO: 26.8 %
HGB BLD-MCNC: 9.1 G/DL
LYMPHOCYTES # BLD AUTO: 1.1 K/UL
LYMPHOCYTES NFR BLD: 9.8 %
MAGNESIUM SERPL-MCNC: 1.8 MG/DL
MCH RBC QN AUTO: 30.9 PG
MCHC RBC AUTO-ENTMCNC: 33.8 G/DL
MCV RBC AUTO: 91 FL
MONOCYTES # BLD AUTO: 1 K/UL
MONOCYTES NFR BLD: 8.7 %
NEUTROPHILS # BLD AUTO: 8.7 K/UL
NEUTROPHILS NFR BLD: 76.7 %
PHOSPHATE SERPL-MCNC: 3.7 MG/DL
PLATELET # BLD AUTO: 380 K/UL
PMV BLD AUTO: 8.4 FL
POCT GLUCOSE: 143 MG/DL (ref 70–110)
POCT GLUCOSE: 203 MG/DL (ref 70–110)
POTASSIUM SERPL-SCNC: 3.7 MMOL/L
RBC # BLD AUTO: 2.93 M/UL
SODIUM SERPL-SCNC: 138 MMOL/L
WBC # BLD AUTO: 11.3 K/UL

## 2017-09-27 PROCEDURE — 94761 N-INVAS EAR/PLS OXIMETRY MLT: CPT

## 2017-09-27 PROCEDURE — 97530 THERAPEUTIC ACTIVITIES: CPT

## 2017-09-27 PROCEDURE — 36415 COLL VENOUS BLD VENIPUNCTURE: CPT

## 2017-09-27 PROCEDURE — 63600175 PHARM REV CODE 636 W HCPCS: Performed by: INTERNAL MEDICINE

## 2017-09-27 PROCEDURE — 80048 BASIC METABOLIC PNL TOTAL CA: CPT

## 2017-09-27 PROCEDURE — 99239 HOSP IP/OBS DSCHRG MGMT >30: CPT | Mod: ,,, | Performed by: INTERNAL MEDICINE

## 2017-09-27 PROCEDURE — 80100016 HC MAINTENANCE HEMODIALYSIS

## 2017-09-27 PROCEDURE — 25000003 PHARM REV CODE 250: Performed by: INTERNAL MEDICINE

## 2017-09-27 PROCEDURE — 85025 COMPLETE CBC W/AUTO DIFF WBC: CPT

## 2017-09-27 PROCEDURE — 84100 ASSAY OF PHOSPHORUS: CPT

## 2017-09-27 PROCEDURE — 83735 ASSAY OF MAGNESIUM: CPT

## 2017-09-27 PROCEDURE — 97116 GAIT TRAINING THERAPY: CPT

## 2017-09-27 RX ORDER — HYDROCODONE BITARTRATE AND ACETAMINOPHEN 5; 325 MG/1; MG/1
1 TABLET ORAL EVERY 6 HOURS PRN
Qty: 15 TABLET | Refills: 0 | Status: ON HOLD | OUTPATIENT
Start: 2017-09-27 | End: 2023-05-20 | Stop reason: HOSPADM

## 2017-09-27 RX ORDER — CLINDAMYCIN HYDROCHLORIDE 300 MG/1
300 CAPSULE ORAL 3 TIMES DAILY
Qty: 30 CAPSULE | Refills: 0 | Status: SHIPPED | OUTPATIENT
Start: 2017-09-27 | End: 2017-10-07

## 2017-09-27 RX ADMIN — EPOETIN ALFA 5000 UNITS: 20000 SOLUTION INTRAVENOUS; SUBCUTANEOUS at 12:09

## 2017-09-27 RX ADMIN — PIPERACILLIN SODIUM AND TAZOBACTAM SODIUM 4.5 G: 4; .5 INJECTION, POWDER, LYOPHILIZED, FOR SOLUTION INTRAVENOUS at 08:09

## 2017-09-27 NOTE — PROGRESS NOTES
INPATIENT NEPHROLOGY PROGRESS NOTE  [facility] NEPHROLOGY    Isabella Boone  09/27/2017    Reason for consultation:    esrd    Chief Complaint:   Chief Complaint   Patient presents with    Fatigue     feeling weak and too tired to go to dialysis today, decreased appetite        History of Present Illness:     The patient is a 72 woman admitted with thigh abscess. She has a h/o esrd, anemia and spth. Renal was consulted for HD.    9/24 sleeping    9/25 VSS, no new complains. Plan for HD later today.    9/26 Feels well, VSS,next HD in AM, LUE acesteff with good thrill and bruit.    9/27 VSS, seen on HD machine today, UF as tolerated. Can be d/c from renal stand-point after HD today.    Plan of Care:     Assessment:     ESRD on HD MWF  S/p I and D abscess  Anemia  HTN  SHPT    Plan:     1. ESRD--MWF HD. Next HD today.    2. Volume/Blood Pressure-uf as tolerated.    3. Electrolytes/Acid Base-3 k bath for now.    4. Bone Mineral Metabolism-outpt activated vit d as needed.    5. Anemia of CKD-SHAR with renal replacement therapy as needed.    6. Medications- renal dose for HD.      Thank you for allowing us to participate in this patient's care. We will continue to follow.    Medications:  Scheduled Meds:   calcium carbonate  1 tablet Oral Daily    epoetin cheli (PROCRIT) injection  5,000 Units Intravenous Every Mon, Wed, Fri    gabapentin  100 mg Oral Daily    pantoprazole  40 mg Oral Daily    piperacillin-tazobactam 4.5 g in dextrose 5 % 100 mL IVPB (ready to mix system)  4.5 g Intravenous Q12H     Continuous Infusions:   PRN Meds:.acetaminophen, dextrose 50%, dextrose 50%, glucagon (human recombinant), glucose, glucose, heparin (porcine), hydrocodone-acetaminophen 5-325mg, HYDROmorphone, insulin aspart    Allergies:  Ace inhibitors; Betadine [povidone-iodine]; Dye; and Gentamicin    Vital Signs:  Temp Readings from Last 3 Encounters:   09/27/17 97.5 °F (36.4 °C) (Oral)       Pulse Readings from Last 3 Encounters:    09/27/17 72       BP Readings from Last 3 Encounters:   09/27/17 (!) 141/64       Weight:  Wt Readings from Last 3 Encounters:   09/22/17 123.4 kg (272 lb)       Review of Systems:  Review of Systems - All 14 systems reviewed and negative, except as noted in HPI    Physical Exam   Constitutional: She is oriented to person, place, and time. She appears well-developed and well-nourished.   HENT:   Head: Normocephalic and atraumatic.   Mouth/Throat: Oropharynx is clear and moist.   Eyes: EOM are normal. Pupils are equal, round, and reactive to light. No scleral icterus.   Neck: Neck supple.   Cardiovascular: Normal rate and regular rhythm.    Pulmonary/Chest: Effort normal. No stridor. No respiratory distress.   Abdominal: Soft. She exhibits no distension.   Musculoskeletal: Normal range of motion. She exhibits no edema or deformity.   Neurological: She is alert and oriented to person, place, and time. No cranial nerve deficit.   Skin: Skin is warm and dry. No rash noted. She is not diaphoretic. No erythema.   Psychiatric: She has a normal mood and affect. Her behavior is normal.            Results:  Lab Results   Component Value Date     09/27/2017    K 3.7 09/27/2017    CL 99 09/27/2017    CO2 23 09/27/2017    BUN 30 (H) 09/27/2017    CREATININE 8.4 (H) 09/27/2017    CALCIUM 7.1 (L) 09/27/2017    ANIONGAP 16 09/27/2017    ESTGFRAFRICA 5 (A) 09/27/2017    EGFRNONAA 4 (A) 09/27/2017       Lab Results   Component Value Date    CALCIUM 7.1 (L) 09/27/2017    PHOS 3.7 09/27/2017         Recent Labs  Lab 09/27/17  0534   WBC 11.30   RBC 2.93*   HGB 9.1*   HCT 26.8*   *   MCV 91   MCH 30.9   MCHC 33.8       I have personally reviewed pertinent radiological imaging and reports.      Addendum @ 2:17 PM    Seen on HD machine, tolerating well. UF as tolerated.    Robert Frey MD

## 2017-09-27 NOTE — PLAN OF CARE
Discharge met with patient re: home health. Patient signed patient choice disclosure form choosing [facility]. Sent home health referral to [place]  via [facility] system for acceptance JACQUE Jimenes

## 2017-09-27 NOTE — PT/OT/SLP PROGRESS
Physical Therapy  Treatment    Isabella Boone   MRN: [id number]   Admitting Diagnosis: Cellulitis and abscess of right leg    PT Received On: 09/27/17  PT Start Time: 1015     PT Stop Time: 1040    PT Total Time (min): 25 min       Billable Minutes:  Gait [id number] and Therapeutic Activity 15min    Treatment Type: Treatment  PT/PTA: PTA     PTA Visit Number: 2       General Precautions: Standard, fall  Orthopedic Precautions: N/A   Braces:           Subjective:  Communicated with nurse Pinzon prior to session.  Agreed to mobilize.    Pain/Comfort  Pain Rating 1: 0/10    Objective:   Patient found with: peripheral IV    Functional Mobility:  Bed Mobility:        Transfers:  Sit <> Stand Assistance: Minimum Assistance  Sit <> Stand Assistive Device: Rolling Walker    Gait:   Gait Distance: 150' + 15' after seated rest.  Assistance 1: Contact Guard Assistance  Gait Assistive Device: Rolling walker  Gait Pattern: 3-point gait  Gait Deviation(s): decreased goran, increased time in double stance, decreased velocity of limb motion, decreased step length, forward lean    Stairs:      Balance:   Static Sit: GOOD-: Takes MODERATE challenges from all directions but inconsistently  Dynamic Sit: GOOD-: Maintains balance through MODERATE excursions of active trunk movement,     Static Stand: FAIR: Maintains without assist but unable to take challenges  Dynamic stand: FAIR: Needs CONTACT GUARD during gait     Therapeutic Activities and Exercises:  Seated in chair.  Ambulated 150' with rw and CGA with 2 standing rests . Sat in chair then ambulated 15' to restroom. On /off commode with CGA. Required A to perform hygiene task in standing. Ambulated to w/c outside of door.  Appeared fatigued following increased activity.    AM-PAC 6 CLICK MOBILITY  How much help from another person does this patient currently need?   1 = Unable, Total/Dependent Assistance  2 = A lot, Maximum/Moderate Assistance  3 = A little, Minimum/Contact  Guard/Supervision  4 = None, Modified [place]/Independent         AM-PAC Raw Score CMS G-Code Modifier Level of Impairment Assistance   6 % Total / Unable   7 - 9 CM 80 - 100% Maximal Assist   10 - 14 CL 60 - 80% Moderate Assist   15 - 19 CK 40 - 60% Moderate Assist   20 - 22 CJ 20 - 40% Minimal Assist   23 CI 1-20% SBA / CGA   24 CH 0% Independent/ Mod I     Patient left up in chair with all lines intact, call button in reach and nurse Alberta present.    Assessment:  Isabella Boone is a 72 y.o. female with a medical diagnosis of Cellulitis and abscess of right leg and presents with weakness, limited endurance.    Rehab identified problem list/impairments: Rehab identified problem list/impairments: weakness, impaired endurance, impaired self care skills, impaired functional mobilty, gait instability    Rehab potential is fair.    Activity tolerance: Fair    Discharge recommendations: Discharge Facility/Level Of Care Needs: LTACH (long term acute care hospital)     Barriers to discharge: Barriers to Discharge: Decreased caregiver support    Equipment recommendations: Equipment Needed After Discharge: walker, rolling     GOALS:    Physical Therapy Goals        Problem: Physical Therapy Goal    Goal Priority Disciplines Outcome Goal Variances Interventions   Physical Therapy Goal    High PT/OT, PT Ongoing (interventions implemented as appropriate)     Description:  Goals to be met by: 10-     Patient will increase functional independence with mobility by performin. Supine to sit with Contact Guard Assistance  2. Sit to stand transfer with Contact Guard Assistance  3. Bed to chair transfer with Contact Guard Assistance using Rolling Walker  4. Gait  x 240 feet with Minimal Assistance using Rolling Walker.   5. Lower extremity exercise program x20 reps per handout, with assistance as needed                      PLAN:    Patient to be seen 6 x/week  to address the above listed problems via gait  training, therapeutic activities, therapeutic exercises  Plan of Care expires: 10/06/17  Plan of Care reviewed with: patient         Desiree Lopez, PTA  09/27/2017

## 2017-09-27 NOTE — PLAN OF CARE
Problem: Physical Therapy Goal  Goal: Physical Therapy Goal  Goals to be met by: 10-     Patient will increase functional independence with mobility by performin. Supine to sit with Contact Guard Assistance  2. Sit to stand transfer with Contact Guard Assistance  3. Bed to chair transfer with Contact Guard Assistance using Rolling Walker  4. Gait  x 240 feet with Minimal Assistance using Rolling Walker.   5. Lower extremity exercise program x20 reps per handout, with assistance as needed     Outcome: Ongoing (interventions implemented as appropriate)  Ambulated 150' + 15 ' additional after seated rest period with rw and CGA.

## 2017-09-27 NOTE — DISCHARGE SUMMARY
"Discharge Summary  Hospital Medicine    Admit Date: 9/22/2017    Date and Time: 9/27/20179:36 AM    Discharge Attending Physician: Joce Barney MD    Primary Care Physician: Primary Doctor No    Diagnoses:  Active Hospital Problems    Diagnosis  POA    *Cellulitis and abscess of right leg [L03.115, L02.415]  Yes    Abscess [L02.91] with MRSA sp  Yes    ESRD (end stage renal disease) [N18.6]  Yes    Morbid obesity due to excess calories [E66.01]  Yes      Resolved Hospital Problems    Diagnosis Date Resolved POA   No resolved problems to display.     Discharged Condition: Good    Hospital Course:   Patient is a 72 y.o. female admitted to Hospitalist Service from [facility] Emergency Room with complaint of profound weakness. Patient reportedly has past medical history significant for morbid obesity, ESRD (on HD - M/W/F at [facility]). Patient c/o gradual onset of fatigue that has been constant for the past couple of days. Associated with nausea, decreased appetite and "feeling hot". No reported fevers. Pt is not drinking fluids. Denied any vomiting, diarrhea or abdominal pain. Pt also reported a rash to the right inner thigh with sharp pain and drainage for 1 week. Her rash rubs when walking and increases pain. No recent confusion or LOC. Pt was seen at ER 5 days ago for headache, ear pain and runny nose. Patient denied chest pain, shortness of breath, abdominal pain, nausea, vomiting, headache, vision changes, focal neuro-deficits, cough or fever. In the ER, noted to have right medial thigh and an abscess was I+D performed and wound packing done by the ER doctor. Patient was admitted to Hospitalist medicine service. Patient was evaluated by Dr. Stack. Patient was started on IV antibiotics. Patient encouraged to elevate extremity. Routine wound care continued. Patient's symptoms improved and patient transitioned to PO antibiotics. Wound care provided. Patient received routine HD. Patient was " discharged home in stable condition with following discharge plan of care. Total time with the patient was 30 minutes and greater than 50% was spent in counseling and coordination of care. The assessment and plan have been discussed at length. Physicians' notes reviewed. Labs and procedure reviewed.     Consults: Dr. Stack, Dr. Frey    Significant Diagnostic Studies:     Microbiology Results (last 7 days)     Procedure Component Value Units Date/Time    Culture, Anaerobe [[id number]] Collected:  09/24/17 1120    Order Status:  Completed Specimen:  Abscess from Leg, Right Updated:  09/27/17 0815     Anaerobic Culture Culture in progress    Aerobic culture [[id number]]  (Susceptibility) Collected:  09/24/17 1120    Order Status:  Completed Specimen:  Abscess from Leg, Right Updated:  09/26/17 1106     Aerobic Bacterial Culture --     METHICILLIN RESISTANT STAPHYLOCOCCUS AUREUS  Many          Special Treatments/Procedures: None  Disposition: Home or Self Care    Medications:  Reconciled Home Medications: Current Discharge Medication List      START taking these medications    Details   clindamycin (CLEOCIN) 300 MG capsule Take 1 capsule (300 mg total) by mouth 3 (three) times daily.  Qty: 30 capsule, Refills: 0      hydrocodone-acetaminophen 5-325mg (NORCO) 5-325 mg per tablet Take 1 tablet by mouth every 6 (six) hours as needed.  Qty: 15 tablet, Refills: 0         CONTINUE these medications which have NOT CHANGED    Details   calcium carbonate (TUMS) 200 mg calcium (500 mg) chewable tablet Take 1 tablet by mouth once daily.      gabapentin (NEURONTIN) 100 MG capsule Take 100 mg by mouth once daily.      loratadine (CLARITIN) 10 mg tablet Take 10 mg by mouth once daily.      VIT BCOMP,C/FOLIC ACID/ZINC (B COMPLEX-C-FOLIC ACID-ZN ORAL) Take by mouth.             Discharge Procedure Orders  Diet general   Order Comments: Cardiac/ 2 gram sodium low cholesterol diet     Diet Diabetic 1800 Calories     Diet renal      Other restrictions (specify):   Order Comments: Fall precautions     Call MD for:   Order Comments: For worsening symptoms, chest pain, shortness of breath, increased abdominal pain, high grade fever, stroke or stroke like symptoms, immediately go to the nearest Emergency Room or call 911 as soon as possible.     Change dressing (specify)   Order Comments: Dressing change: Daily wound packing, wet to dry dressing as per home health       Follow-up Information     PCP In 1 week.           Danny Stack MD.    Specialties:  General Surgery, Bariatrics, Surgery  Why:  As needed  Contact information:  [address] [address] [phone]             Please follow up.    Contact information:  Continue routine HD as before.

## 2017-09-27 NOTE — PLAN OF CARE
Problem: Patient Care Overview  Goal: Plan of Care Review  Outcome: Revised  Pt is AAOx4.  Pt c/o pain, prn medication given, pt tolerated well.  Ambulatory with assist.  Pt is continent of B & B.  Dressing monitored and reinforced.  VS stable, in NAD, pt remains afebrile.  Pt remains free from injury.  Bed in low position, wheels locked, call light within reach.  Will continue to monitor.

## 2017-09-27 NOTE — PROGRESS NOTES
Pt completed activity with PT.  Ready for HD at this time.  D/c orders noted.  OK to d/c after dialysis per Dr. Frey.  Pt verbalized understanding.  Will provide wound care after dialysis prior to d/c.

## 2017-09-27 NOTE — PLAN OF CARE
Already has wound care orders for home health.  Unable to examine sacral area friction/shear due to dialysis.  Discussed with primary nurse.

## 2017-09-27 NOTE — PROGRESS NOTES
09/27/17 1400   Handoff Report   Given To Tamara   Vital Signs   Temp 97 °F (36.1 °C)   Pulse 70   Resp 16   BP (!) 116/59   Post-Hemodialysis Assessment   Rinseback Volume (mL) 250 mL   Blood Volume Processed (Liters) 54 L   Dialyzer Clearance Lightly streaked   Duration of Treatment (minutes) 180 minutes   Hemodialysis Intake (mL) 500 mL   Total UF (mL) 3000 mL   Net Fluid Removal 2500   Patient Response to Treatment tolerated well   Post-Treatment Weight 131.7 kg (290 lb 5.5 oz)   Treatment Weight Change -2.4   Post-Hemodialysis Comments needles pulled without problem

## 2017-09-27 NOTE — PROGRESS NOTES
PIVs removed per MD order.  Dressing to R inner thigh changed as ordered.  Written and verbal discharge instructions given to patient.  Perscriptions placed in chart and discussed with patient.  Patient verbalized understanding of all instructions.  Patient left unit via Wheelchair escorted by nurse.   No complications.

## 2017-09-27 NOTE — PROGRESS NOTES
09/27/17 0748   Patient Assessment/Suction   Level of Consciousness (AVPU) alert   Respiratory Effort Normal;Unlabored   PRE-TX-O2-ETCO2   O2 Device (Oxygen Therapy) room air   SpO2 99 %   Pulse Oximetry Type Intermittent   $ Pulse Oximetry - Multiple Charge Pulse Oximetry - Multiple   Pulse 71   Resp 18

## 2017-09-28 ENCOUNTER — TELEPHONE (OUTPATIENT)
Dept: BARIATRICS | Facility: CLINIC | Age: 72
End: 2017-09-28

## 2017-09-28 ENCOUNTER — PATIENT OUTREACH (OUTPATIENT)
Dept: ADMINISTRATIVE | Facility: CLINIC | Age: 72
End: 2017-09-28

## 2017-09-28 NOTE — TELEPHONE ENCOUNTER
Spoke with Geoff with hh in office, verbal taken to start home health wound care wet to dry dressings per md.

## 2017-09-28 NOTE — PROGRESS NOTES
C3 nurse attempted to contact patient. No answer. The following message was left for the patient to return the call:  Good afternoon I am a nurse calling on behalf of [facility] from the Care Coordination Center.  This is a Transitional Care Call for Isabella Boone . When you have a moment please contact us at [phone] or [phone] Monday through Friday, between the hours of 8 am to 4 pm. We look forward to speaking with you. On behalf of [facility] have a nice day.    The patient does not have a scheduled HOSFU appointment within 7-14 days post hospital discharge date 9/27/17. Non [facility] Provider, unable to route at this time.

## 2017-09-28 NOTE — TELEPHONE ENCOUNTER
----- Message from Ping Campos sent at 9/28/2017  2:10 PM CDT -----  Contact: Radha Garcia ,  of [facility] , (office #[phone]) [phone] (personal cell) is calling to  if Dr. Stack will sign the Home Health Orders for this patient due to I&D.   Caller is waiting for the call today.  Please advise.  Thanks!

## 2017-09-28 NOTE — PLAN OF CARE
09/28/17 0751   Final Note   Assessment Type Final Discharge Note   Discharge Disposition Home-Health   Hospital Follow Up  Appt(s) scheduled? No  (pt is followed by MD at her outpt dialysis, who is her PCP)   Discharge plans and expectations educations in teach back method with documentation complete? Yes

## 2017-09-29 LAB — BACTERIA SPEC ANAEROBE CULT: NORMAL

## 2017-09-29 NOTE — PATIENT INSTRUCTIONS
Abscess (Incision & Drainage)  An abscess is sometimes called a boil. It happens when bacteria get trapped under the skin and start to grow. Pus forms inside the abscess as the body responds to the bacteria. An abscess can happen with an insect bite, ingrown hair, blocked oil gland, pimple, cyst, or puncture wound.  Your healthcare provider has drained the pus from your abscess. If the abscess pocket was large, your healthcare provider may have put in gauze packing. Your provider will need to remove it on your next visit. He or she may also replace it at that time. You may not need antibiotics to treat a simple abscess, unless the infection is spreading into the skin around the wound (cellulitis).  The wound will take about 1 to 2 weeks to heal, depending on the size of the abscess. Healthy tissue will grow from the bottom and sides of the opening until it seals over.  Home care  These tips can help your wound heal:  · The wound may drain for the first 2 days. Cover the wound with a clean dry dressing. Change the dressing if it becomes soaked with blood or pus.  · If a gauze packing was placed inside the abscess pocket, you may be told to remove it yourself. You may do this in the shower. Once the packing is removed, you should wash the area in the shower, or clean the area as directed by your provider. Continue to do this until the skin opening has closed. Make sure you wash your hands after changing the packing or cleaning the wound.  · If you were prescribed antibiotics, take them as directed until they are all gone.  · You may use acetaminophen or ibuprofen to control pain, unless another pain medicine was prescribed. If you have liver disease or ever had a stomach ulcer, talk with your doctor before using these medicines.  Follow-up care  Follow up with your healthcare provider, or as advised. If a gauze packing was put in your wound, it should be removed in 1 to 2 days. Check your wound every day for any signs  that the infection is getting worse. The signs are listed below.  When to seek medical advice  Call your healthcare provider right away if any of these occur:  · Increasing redness or swelling  · Red streaks in the skin leading away from the wound  · Increasing local pain or swelling  · Continued pus draining from the wound 2 days after treatment  · Fever of 100.4ºF (38ºC) or higher, or as directed by your healthcare provider  · Boil returns when you are at home  Date Last Reviewed: 9/1/2016  © 6242-7681 [employer]. [address] [place] [address]. All rights reserved. This information is not intended as a substitute for professional medical care. Always follow your healthcare professional's instructions.

## 2017-10-02 ENCOUNTER — TELEPHONE (OUTPATIENT)
Dept: BARIATRICS | Facility: CLINIC | Age: 72
End: 2017-10-02

## 2017-10-02 NOTE — TELEPHONE ENCOUNTER
----- Message from Michelle Olivarez sent at 10/2/2017  9:45 AM CDT -----  Contact: Radha with [place]  Radha with [facility] calling regarding patient's wound. Patient has developed a 1.5 cm slough in the wound bed. Please call Radha at [phone]. Thanks!

## 2017-10-09 NOTE — PHYSICIAN QUERY
"PT Name: Isabella Boone  MR #: [id number]    Physician Query Form - Procedure Clarification     CDS/: Tammy Velasquez RN  CCDS               Contact information: [email or web address]  This form is a permanent document in the medical record.     Query Date: October 9, 2017  By submitting this query, we are merely seeking further clarification of documentation. Please utilize your independent clinical judgment when addressing the question(s) below.    The Medical record contains the following:     Indicators       Supporting Clinical Findings   Location in Medical Record   X Documentation of "Debridement"    An incision was made transversely across abscess cavity and carried for almost 10 cm. The abscess cavity was immediately seen with copoious pus which was cultured. The cavity extended 10 cm deep. The wound was cleaned. Debridement with cautery of necrotic tissue was undertaken, loculations broken bluntly. No tracking was seen.     OP note 9/24    Documentation of "I & D"       EBL =     X Other:  Cellulitis and abscess of right leg    Plan further incision and debridement in operating room if no improvement by tomorrow   Surgery consult 9/23     Excisional debridement is a surgical removal of  nonvitalized tissue, necrosis or slough. The use of a sharp instrument does not always indicate that an excisional debridement was performed.  Non excisional debridement is the scraping away or removal of loose tissue fragments.    Provider, please specify type of procedure(s) performed:    [ x ] Excisional Debridement (Specify site, type, depth of tissue removal ,instrument, size of wound & EBL)   * Site: (Specify)_________thigh abscess____________________________   * Depth of tissue excised:    [  ] Skin/Subcutaneous [x ] Soft tissue [ ] Fascia [ ] Muscle [ ] Bone   * Instrument used:    [  ] Scalpel [ ] Scissors [ ] Curette [x ] Other (Specify) _______cautery_____________   * Size of wound after debridement: " (Specify) ______10 cm_______________________   * EBL (Specify) ___________see op note_______________________      [  ] Non Excisional Debridement   * Depth of tissue debrided:    [  ] Skin/Subcutaneous [ ] Soft tissue [ ] Fascia [ ] Muscle [ ] Bone      [  ] Other Procedure (Specify) ________________________________    [  ] Clinically Undetermined

## 2023-03-01 ENCOUNTER — HOSPITAL ENCOUNTER (INPATIENT)
Facility: HOSPITAL | Age: 78
LOS: 3 days | Discharge: HOME | DRG: 308 | End: 2023-03-06
Attending: EMERGENCY MEDICINE | Admitting: HOSPITALIST
Payer: MEDICARE

## 2023-03-01 DIAGNOSIS — I48.91 A-FIB: ICD-10-CM

## 2023-03-01 DIAGNOSIS — I48.0 PAROXYSMAL ATRIAL FIBRILLATION: ICD-10-CM

## 2023-03-01 DIAGNOSIS — I49.9 IRREGULAR HEARTBEAT: ICD-10-CM

## 2023-03-01 DIAGNOSIS — I48.91 ATRIAL FIBRILLATION WITH RAPID VENTRICULAR RESPONSE: Primary | ICD-10-CM

## 2023-03-01 DIAGNOSIS — R07.9 CHEST PAIN: ICD-10-CM

## 2023-03-01 DIAGNOSIS — J81.1 PULMONARY EDEMA: ICD-10-CM

## 2023-03-01 DIAGNOSIS — R00.0 TACHYCARDIA: ICD-10-CM

## 2023-03-01 PROBLEM — E87.5 HYPERKALEMIA: Status: ACTIVE | Noted: 2023-03-01

## 2023-03-01 LAB
ALBUMIN SERPL BCP-MCNC: 2.8 G/DL (ref 3.5–5.2)
ALP SERPL-CCNC: 41 U/L (ref 55–135)
ALT SERPL W/O P-5'-P-CCNC: 16 U/L (ref 10–44)
ANION GAP SERPL CALC-SCNC: 14 MMOL/L (ref 8–16)
AST SERPL-CCNC: 20 U/L (ref 10–40)
BASOPHILS # BLD AUTO: 0.04 K/UL (ref 0–0.2)
BASOPHILS NFR BLD: 0.5 % (ref 0–1.9)
BILIRUB SERPL-MCNC: 0.5 MG/DL (ref 0.1–1)
BNP SERPL-MCNC: 314 PG/ML (ref 0–99)
BUN SERPL-MCNC: 28 MG/DL (ref 8–23)
CALCIUM SERPL-MCNC: 8.2 MG/DL (ref 8.7–10.5)
CHLORIDE SERPL-SCNC: 102 MMOL/L (ref 95–110)
CO2 SERPL-SCNC: 22 MMOL/L (ref 23–29)
CREAT SERPL-MCNC: 5.1 MG/DL (ref 0.5–1.4)
DIFFERENTIAL METHOD: ABNORMAL
EOSINOPHIL # BLD AUTO: 0.2 K/UL (ref 0–0.5)
EOSINOPHIL NFR BLD: 1.8 % (ref 0–8)
ERYTHROCYTE [DISTWIDTH] IN BLOOD BY AUTOMATED COUNT: 14.2 % (ref 11.5–14.5)
EST. GFR  (NO RACE VARIABLE): 8 ML/MIN/1.73 M^2
FERRITIN SERPL-MCNC: 739 NG/ML (ref 20–300)
GLUCOSE SERPL-MCNC: 177 MG/DL (ref 70–110)
HCT VFR BLD AUTO: 38.2 % (ref 37–48.5)
HGB BLD-MCNC: 12.5 G/DL (ref 12–16)
IMM GRANULOCYTES # BLD AUTO: 0.1 K/UL (ref 0–0.04)
IMM GRANULOCYTES NFR BLD AUTO: 1.2 % (ref 0–0.5)
LYMPHOCYTES # BLD AUTO: 2.6 K/UL (ref 1–4.8)
LYMPHOCYTES NFR BLD: 30.3 % (ref 18–48)
MCH RBC QN AUTO: 31.3 PG (ref 27–31)
MCHC RBC AUTO-ENTMCNC: 32.7 G/DL (ref 32–36)
MCV RBC AUTO: 96 FL (ref 82–98)
MONOCYTES # BLD AUTO: 0.9 K/UL (ref 0.3–1)
MONOCYTES NFR BLD: 10.3 % (ref 4–15)
NEUTROPHILS # BLD AUTO: 4.8 K/UL (ref 1.8–7.7)
NEUTROPHILS NFR BLD: 55.9 % (ref 38–73)
NRBC BLD-RTO: 0 /100 WBC
PLATELET # BLD AUTO: 166 K/UL (ref 150–450)
PMV BLD AUTO: 11.1 FL (ref 9.2–12.9)
POTASSIUM SERPL-SCNC: 4.5 MMOL/L (ref 3.5–5.1)
PROT SERPL-MCNC: 7.5 G/DL (ref 6–8.4)
RBC # BLD AUTO: 4 M/UL (ref 4–5.4)
SODIUM SERPL-SCNC: 138 MMOL/L (ref 136–145)
T4 FREE SERPL-MCNC: 0.73 NG/DL (ref 0.71–1.51)
TROPONIN I SERPL DL<=0.01 NG/ML-MCNC: 0.07 NG/ML (ref 0–0.03)
TSH SERPL DL<=0.005 MIU/L-ACNC: 2.23 UIU/ML (ref 0.4–4)
WBC # BLD AUTO: 8.48 K/UL (ref 3.9–12.7)

## 2023-03-01 PROCEDURE — 99285 EMERGENCY DEPT VISIT HI MDM: CPT | Mod: 25

## 2023-03-01 PROCEDURE — 86704 HEP B CORE ANTIBODY TOTAL: CPT | Performed by: HOSPITALIST

## 2023-03-01 PROCEDURE — 36415 COLL VENOUS BLD VENIPUNCTURE: CPT | Performed by: EMERGENCY MEDICINE

## 2023-03-01 PROCEDURE — 87340 HEPATITIS B SURFACE AG IA: CPT | Performed by: HOSPITALIST

## 2023-03-01 PROCEDURE — 83880 ASSAY OF NATRIURETIC PEPTIDE: CPT | Performed by: HOSPITALIST

## 2023-03-01 PROCEDURE — 93005 ELECTROCARDIOGRAM TRACING: CPT

## 2023-03-01 PROCEDURE — G0378 HOSPITAL OBSERVATION PER HR: HCPCS

## 2023-03-01 PROCEDURE — 84484 ASSAY OF TROPONIN QUANT: CPT | Performed by: EMERGENCY MEDICINE

## 2023-03-01 PROCEDURE — 80053 COMPREHEN METABOLIC PANEL: CPT | Performed by: EMERGENCY MEDICINE

## 2023-03-01 PROCEDURE — 96372 THER/PROPH/DIAG INJ SC/IM: CPT | Performed by: HOSPITALIST

## 2023-03-01 PROCEDURE — 93010 ELECTROCARDIOGRAM REPORT: CPT | Mod: 76,,, | Performed by: INTERNAL MEDICINE

## 2023-03-01 PROCEDURE — 84443 ASSAY THYROID STIM HORMONE: CPT | Performed by: HOSPITALIST

## 2023-03-01 PROCEDURE — 85025 COMPLETE CBC W/AUTO DIFF WBC: CPT | Performed by: EMERGENCY MEDICINE

## 2023-03-01 PROCEDURE — 80100014 HC HEMODIALYSIS 1:1

## 2023-03-01 PROCEDURE — 93010 EKG 12-LEAD: ICD-10-PCS | Mod: 76,,, | Performed by: INTERNAL MEDICINE

## 2023-03-01 PROCEDURE — 83036 HEMOGLOBIN GLYCOSYLATED A1C: CPT | Performed by: HOSPITALIST

## 2023-03-01 PROCEDURE — 84466 ASSAY OF TRANSFERRIN: CPT | Performed by: HOSPITALIST

## 2023-03-01 PROCEDURE — 86706 HEP B SURFACE ANTIBODY: CPT | Performed by: HOSPITALIST

## 2023-03-01 PROCEDURE — 84439 ASSAY OF FREE THYROXINE: CPT | Performed by: HOSPITALIST

## 2023-03-01 PROCEDURE — 36415 COLL VENOUS BLD VENIPUNCTURE: CPT | Performed by: HOSPITALIST

## 2023-03-01 PROCEDURE — 63600175 PHARM REV CODE 636 W HCPCS: Performed by: HOSPITALIST

## 2023-03-01 PROCEDURE — 82728 ASSAY OF FERRITIN: CPT | Performed by: HOSPITALIST

## 2023-03-01 PROCEDURE — 25000003 PHARM REV CODE 250: Performed by: HOSPITALIST

## 2023-03-01 RX ORDER — OXYCODONE HYDROCHLORIDE 5 MG/1
5 CAPSULE ORAL EVERY 4 HOURS PRN
COMMUNITY

## 2023-03-01 RX ORDER — AMOXICILLIN 250 MG
1 CAPSULE ORAL 2 TIMES DAILY
Status: DISCONTINUED | OUTPATIENT
Start: 2023-03-01 | End: 2023-03-06 | Stop reason: HOSPADM

## 2023-03-01 RX ORDER — SODIUM CHLORIDE 0.9 % (FLUSH) 0.9 %
10 SYRINGE (ML) INJECTION EVERY 12 HOURS PRN
Status: DISCONTINUED | OUTPATIENT
Start: 2023-03-01 | End: 2023-03-06 | Stop reason: HOSPADM

## 2023-03-01 RX ORDER — ONDANSETRON 2 MG/ML
4 INJECTION INTRAMUSCULAR; INTRAVENOUS EVERY 8 HOURS PRN
Status: DISCONTINUED | OUTPATIENT
Start: 2023-03-01 | End: 2023-03-06 | Stop reason: HOSPADM

## 2023-03-01 RX ORDER — TALC
6 POWDER (GRAM) TOPICAL NIGHTLY PRN
Status: DISCONTINUED | OUTPATIENT
Start: 2023-03-01 | End: 2023-03-06 | Stop reason: HOSPADM

## 2023-03-01 RX ORDER — DEXTROSE 40 %
30 GEL (GRAM) ORAL
Status: DISCONTINUED | OUTPATIENT
Start: 2023-03-01 | End: 2023-03-06 | Stop reason: HOSPADM

## 2023-03-01 RX ORDER — HEPARIN SODIUM 5000 [USP'U]/ML
5000 INJECTION, SOLUTION INTRAVENOUS; SUBCUTANEOUS EVERY 8 HOURS
Status: DISCONTINUED | OUTPATIENT
Start: 2023-03-01 | End: 2023-03-02

## 2023-03-01 RX ORDER — MUPIROCIN 20 MG/G
OINTMENT TOPICAL 2 TIMES DAILY
Status: DISCONTINUED | OUTPATIENT
Start: 2023-03-01 | End: 2023-03-06 | Stop reason: HOSPADM

## 2023-03-01 RX ORDER — GLUCAGON 1 MG
1 KIT INJECTION
Status: DISCONTINUED | OUTPATIENT
Start: 2023-03-01 | End: 2023-03-06 | Stop reason: HOSPADM

## 2023-03-01 RX ORDER — ACETAMINOPHEN 325 MG/1
650 TABLET ORAL EVERY 4 HOURS PRN
Status: DISCONTINUED | OUTPATIENT
Start: 2023-03-01 | End: 2023-03-06 | Stop reason: HOSPADM

## 2023-03-01 RX ORDER — DEXTROSE 40 %
15 GEL (GRAM) ORAL
Status: DISCONTINUED | OUTPATIENT
Start: 2023-03-01 | End: 2023-03-06 | Stop reason: HOSPADM

## 2023-03-01 RX ORDER — HYDROCODONE BITARTRATE AND ACETAMINOPHEN 5; 325 MG/1; MG/1
1 TABLET ORAL EVERY 6 HOURS PRN
Status: DISCONTINUED | OUTPATIENT
Start: 2023-03-01 | End: 2023-03-06 | Stop reason: HOSPADM

## 2023-03-01 RX ORDER — SODIUM CHLORIDE 9 MG/ML
INJECTION, SOLUTION INTRAVENOUS ONCE
Status: CANCELLED | OUTPATIENT
Start: 2023-03-01 | End: 2023-03-01

## 2023-03-01 RX ORDER — ACETAMINOPHEN 325 MG/1
650 TABLET ORAL EVERY 8 HOURS PRN
Status: DISCONTINUED | OUTPATIENT
Start: 2023-03-01 | End: 2023-03-06 | Stop reason: HOSPADM

## 2023-03-01 RX ORDER — ASCORBIC ACID 500 MG
500 TABLET ORAL DAILY
COMMUNITY

## 2023-03-01 RX ORDER — MEGESTROL ACETATE 40 MG/ML
400 SUSPENSION ORAL 2 TIMES DAILY
COMMUNITY

## 2023-03-01 RX ORDER — HYDROCODONE BITARTRATE AND ACETAMINOPHEN 10; 325 MG/1; MG/1
1 TABLET ORAL EVERY 6 HOURS PRN
Status: DISCONTINUED | OUTPATIENT
Start: 2023-03-01 | End: 2023-03-06 | Stop reason: HOSPADM

## 2023-03-01 RX ORDER — MIDODRINE HYDROCHLORIDE 5 MG/1
10 TABLET ORAL
COMMUNITY

## 2023-03-01 RX ORDER — SODIUM CHLORIDE 9 MG/ML
INJECTION, SOLUTION INTRAVENOUS ONCE
Status: CANCELLED | OUTPATIENT
Start: 2023-03-01

## 2023-03-01 RX ORDER — NALOXONE HCL 0.4 MG/ML
0.02 VIAL (ML) INJECTION
Status: DISCONTINUED | OUTPATIENT
Start: 2023-03-01 | End: 2023-03-06 | Stop reason: HOSPADM

## 2023-03-01 RX ORDER — HEPARIN SODIUM 5000 [USP'U]/ML
5000 INJECTION, SOLUTION INTRAVENOUS; SUBCUTANEOUS
Status: CANCELLED | OUTPATIENT
Start: 2023-03-01

## 2023-03-01 RX ADMIN — MUPIROCIN: 20 OINTMENT TOPICAL at 09:03

## 2023-03-01 RX ADMIN — HEPARIN SODIUM 5000 UNITS: 5000 INJECTION INTRAVENOUS; SUBCUTANEOUS at 09:03

## 2023-03-01 NOTE — ED NOTES
Pt identifiers checked and accurate with Isabella Boone    Pt presents to ED via EMS from [facility] dialysis for tachycardia. Pt denies all complaints at this time.

## 2023-03-01 NOTE — ED PROVIDER NOTES
Encounter Date: 3/1/2023    SCRIBE #1 NOTE: I, Arnaldo Ann, am scribing for, and in the presence of,  Guerrero Gallegos III, MD. I have scribed the entire note.     History     Chief Complaint   Patient presents with    Irregular Heart Beat     Pt went into SVT while doing her dialysis treatment.  Pt tx to ed via ems.  Ems advised pt converted from SVT during transport with no interventions.       Time seen by provider: 4:11 PM on 03/01/2023    Isabella Boone is a 77 y.o. female who presents to the ED via EMS with an irregular heartbeat when she was at dialysis before arriving the the ED. History obtained from an independent historian: EMS states that the patient was in the process of completing dialysis when her heart rate went to 160 bpm. The patient denies chest pain, SOB, or any other symptoms at this time. The patient has a PMHx of CHF, DM, and HTN. The patient has no pertinent PSHx.    The history is provided by the patient and the EMS personnel.   Review of patient's allergies indicates:   Allergen Reactions    Ace inhibitors Other (See Comments)    Betadine [povidone-iodine] Rash    Dye Other (See Comments)    Gentamicin Rash     Past Medical History:   Diagnosis Date    Arthritis     CHF (congestive heart failure)     Diabetes mellitus     Encounter for blood transfusion     Hypertension      Past Surgical History:   Procedure Laterality Date    EYE SURGERY      cataracts    HYSTERECTOMY      patial      Family History   Problem Relation Age of Onset    Arthritis Mother     Diabetes Mother     Diabetes Daughter     Kidney disease Son     Kidney disease Son      Social History     Tobacco Use    Smoking status: Never    Smokeless tobacco: Never   Substance Use Topics    Alcohol use: No    Drug use: No     Review of Systems   Constitutional:  Negative for fever.   HENT:  Negative for sore throat.    Respiratory:  Negative for shortness of breath.    Cardiovascular:  Negative for chest pain.    Gastrointestinal:  Negative for nausea.   Genitourinary:  Negative for dysuria.   Musculoskeletal:  Negative for back pain.   Skin:  Negative for rash.   Neurological:  Negative for weakness.   Hematological:  Does not bruise/bleed easily.     Physical Exam     Initial Vitals [03/01/23 1606]   BP Pulse Resp Temp SpO2   (!) 147/82 99 17 98.6 °F (37 °C) 95 %      MAP       --         Physical Exam    Nursing note and vitals reviewed.  Constitutional: She appears well-developed and well-nourished.   HENT:   Head: Normocephalic and atraumatic.   Eyes: Conjunctivae are normal.   Neck: Neck supple.   Normal range of motion.  Cardiovascular:  Normal rate, regular rhythm and normal heart sounds.     Exam reveals no gallop and no friction rub.       No murmur heard.  Pulmonary/Chest: Effort normal and breath sounds normal. No respiratory distress. She has no wheezes. She has no rhonchi. She has no rales.   Abdominal: Abdomen is soft. She exhibits no distension. There is no abdominal tenderness.   Musculoskeletal:         General: Normal range of motion.      Cervical back: Normal range of motion and neck supple.     Neurological: She is alert and oriented to person, place, and time.   Skin: Skin is warm and dry. No erythema.   Psychiatric: She has a normal mood and affect.       ED Course   Procedures  Labs Reviewed   TROPONIN I - Abnormal; Notable for the following components:       Result Value    Troponin I 0.074 (*)     All other components within normal limits   COMPREHENSIVE METABOLIC PANEL - Abnormal; Notable for the following components:    CO2 22 (*)     Glucose 177 (*)     BUN 28 (*)     Creatinine 5.1 (*)     Calcium 8.2 (*)     Albumin 2.8 (*)     Alkaline Phosphatase 41 (*)     eGFR 8 (*)     All other components within normal limits   CBC W/ AUTO DIFFERENTIAL - Abnormal; Notable for the following components:    MCH 31.3 (*)     Immature Granulocytes 1.2 (*)     Immature Grans (Abs) 0.10 (*)     All other  components within normal limits   TSH   T4, FREE   B-TYPE NATRIURETIC PEPTIDE   HEMOGLOBIN A1C   IRON AND TIBC   FERRITIN   HEPATITIS B CORE ANTIBODY, TOTAL   HEPATITIS B SURFACE ANTIBODY   HEPATITIS B SURFACE ANTIGEN     EKG Readings: (Independently Interpreted)   Initial Reading: No STEMI.   Normal sinus rhythm with a rate of 97 bpm. Indeterminate axis and a right bundle branch block.     Imaging Results              X-Ray Chest AP Portable (Final result)  Result time 03/01/23 16:43:22      Final result by Candy Diaz MD (03/01/23 16:43:22)                   Impression:      Central pulmonary vascular congestion and probable mild pulmonary edema.      Electronically signed by: Candy Diaz  Date:    03/01/2023  Time:    16:43               Narrative:    EXAMINATION:  XR CHEST AP PORTABLE    CLINICAL HISTORY:  Tachycardia, unspecified    TECHNIQUE:  Single view of the chest was obtained.    COMPARISON:  None    FINDINGS:  Low lung volumes accentuating the cardiomediastinal contour.  Central pulmonary vascular congestion.  Atherosclerotic calcification of the aortic arch.  Perihilar and basilar predominant central opacities.  No large pleural effusion or pneumothorax.                                       Medications   mupirocin 2 % ointment (has no administration in time range)   sodium chloride 0.9% flush 10 mL (has no administration in time range)   naloxone 0.4 mg/mL injection 0.02 mg (has no administration in time range)   glucagon (human recombinant) injection 1 mg (has no administration in time range)   dextrose 10% bolus 125 mL 125 mL (has no administration in time range)   dextrose 10% bolus 250 mL 250 mL (has no administration in time range)   dextrose 40 % gel 15,000 mg (has no administration in time range)   dextrose 40 % gel 30,000 mg (has no administration in time range)   heparin (porcine) injection 5,000 Units (has no administration in time range)   acetaminophen tablet 650 mg (has no  administration in time range)   HYDROcodone-acetaminophen 5-325 mg per tablet 1 tablet (has no administration in time range)   HYDROcodone-acetaminophen  mg per tablet 1 tablet (has no administration in time range)   senna-docusate 8.6-50 mg per tablet 1 tablet (has no administration in time range)   ondansetron injection 4 mg (has no administration in time range)   acetaminophen tablet 650 mg (has no administration in time range)   melatonin tablet 6 mg (has no administration in time range)     Medical Decision Making:   History:   Old Medical Records: I decided to obtain old medical records.  Independently Interpreted Test(s):   I have ordered and independently interpreted EKG Reading(s) - see prior notes  Clinical Tests:   Lab Tests: Ordered and Reviewed  Radiological Study: Ordered and Reviewed  Medical Tests: Ordered and Reviewed  ED Management:  77-year-old female developed a tachycardic rate during dialysis.  I reviewed the rhythm strip and it appears to be atrial fibrillation with rapid ventricular response.  She is currently in a sinus rhythm.  I discussed the case with Dr. Collier who will dialyze the patient.  She will be observed on telemetry overnight.     APC / Resident Notes:   I, Dr. Guerrero Gallegos III, personally performed the services described in this documentation. All medical record entries made by the scribe were at my direction and in my presence.  I have reviewed the chart and agree that the record reflects my personal performance and is accurate and complete   Scribe Attestation:   Scribe #1: I performed the above scribed service and the documentation accurately describes the services I performed. I attest to the accuracy of the note.                   Clinical Impression:   Final diagnoses:  [I49.9] Irregular heartbeat  [R00.0] Tachycardia  [I48.91] Atrial fibrillation with rapid ventricular response (Primary)        ED Disposition Condition    Observation Stable                Guerrero ESTEVEZ  El MONAE MD  03/01/23 1829

## 2023-03-02 LAB
ALBUMIN SERPL BCP-MCNC: 2.5 G/DL (ref 3.5–5.2)
ALP SERPL-CCNC: 37 U/L (ref 55–135)
ALT SERPL W/O P-5'-P-CCNC: 13 U/L (ref 10–44)
ANION GAP SERPL CALC-SCNC: 12 MMOL/L (ref 8–16)
AST SERPL-CCNC: 17 U/L (ref 10–40)
BASOPHILS # BLD AUTO: 0.03 K/UL (ref 0–0.2)
BASOPHILS NFR BLD: 0.4 % (ref 0–1.9)
BILIRUB SERPL-MCNC: 0.5 MG/DL (ref 0.1–1)
BUN SERPL-MCNC: 30 MG/DL (ref 8–23)
CALCIUM SERPL-MCNC: 7.8 MG/DL (ref 8.7–10.5)
CHLORIDE SERPL-SCNC: 102 MMOL/L (ref 95–110)
CHOLEST SERPL-MCNC: 127 MG/DL (ref 120–199)
CHOLEST/HDLC SERPL: 4.5 {RATIO} (ref 2–5)
CO2 SERPL-SCNC: 24 MMOL/L (ref 23–29)
CREAT SERPL-MCNC: 5.3 MG/DL (ref 0.5–1.4)
DIFFERENTIAL METHOD: ABNORMAL
EOSINOPHIL # BLD AUTO: 0.1 K/UL (ref 0–0.5)
EOSINOPHIL NFR BLD: 1.7 % (ref 0–8)
ERYTHROCYTE [DISTWIDTH] IN BLOOD BY AUTOMATED COUNT: 14.2 % (ref 11.5–14.5)
EST. GFR  (NO RACE VARIABLE): 8 ML/MIN/1.73 M^2
ESTIMATED AVG GLUCOSE: 163 MG/DL (ref 68–131)
GLUCOSE SERPL-MCNC: 158 MG/DL (ref 70–110)
HBA1C MFR BLD: 7.3 % (ref 4–5.6)
HBV CORE AB SERPL QL IA: NORMAL
HBV SURFACE AG SERPL QL IA: NORMAL
HCT VFR BLD AUTO: 35.7 % (ref 37–48.5)
HDLC SERPL-MCNC: 28 MG/DL (ref 40–75)
HDLC SERPL: 22 % (ref 20–50)
HGB BLD-MCNC: 11.5 G/DL (ref 12–16)
IMM GRANULOCYTES # BLD AUTO: 0.08 K/UL (ref 0–0.04)
IMM GRANULOCYTES NFR BLD AUTO: 1.1 % (ref 0–0.5)
IRON SERPL-MCNC: 86 UG/DL (ref 30–160)
LDLC SERPL CALC-MCNC: 85.2 MG/DL (ref 63–159)
LYMPHOCYTES # BLD AUTO: 2.3 K/UL (ref 1–4.8)
LYMPHOCYTES NFR BLD: 30.1 % (ref 18–48)
MAGNESIUM SERPL-MCNC: 1.8 MG/DL (ref 1.6–2.6)
MCH RBC QN AUTO: 31 PG (ref 27–31)
MCHC RBC AUTO-ENTMCNC: 32.2 G/DL (ref 32–36)
MCV RBC AUTO: 96 FL (ref 82–98)
MONOCYTES # BLD AUTO: 0.9 K/UL (ref 0.3–1)
MONOCYTES NFR BLD: 11.9 % (ref 4–15)
NEUTROPHILS # BLD AUTO: 4.1 K/UL (ref 1.8–7.7)
NEUTROPHILS NFR BLD: 54.8 % (ref 38–73)
NONHDLC SERPL-MCNC: 99 MG/DL
NRBC BLD-RTO: 0 /100 WBC
PHOSPHATE SERPL-MCNC: 3.7 MG/DL (ref 2.7–4.5)
PLATELET # BLD AUTO: 165 K/UL (ref 150–450)
PMV BLD AUTO: 11 FL (ref 9.2–12.9)
POTASSIUM SERPL-SCNC: 4.4 MMOL/L (ref 3.5–5.1)
PROT SERPL-MCNC: 6.7 G/DL (ref 6–8.4)
RBC # BLD AUTO: 3.71 M/UL (ref 4–5.4)
SATURATED IRON: 32 % (ref 20–50)
SODIUM SERPL-SCNC: 138 MMOL/L (ref 136–145)
TOTAL IRON BINDING CAPACITY: 268 UG/DL (ref 250–450)
TRANSFERRIN SERPL-MCNC: 181 MG/DL (ref 200–375)
TRIGL SERPL-MCNC: 69 MG/DL (ref 30–150)
WBC # BLD AUTO: 7.51 K/UL (ref 3.9–12.7)

## 2023-03-02 PROCEDURE — 83735 ASSAY OF MAGNESIUM: CPT | Performed by: HOSPITALIST

## 2023-03-02 PROCEDURE — G0378 HOSPITAL OBSERVATION PER HR: HCPCS

## 2023-03-02 PROCEDURE — 99223 PR INITIAL HOSPITAL CARE,LEVL III: ICD-10-PCS | Mod: ,,, | Performed by: INTERNAL MEDICINE

## 2023-03-02 PROCEDURE — 63600175 PHARM REV CODE 636 W HCPCS: Performed by: HOSPITALIST

## 2023-03-02 PROCEDURE — 96372 THER/PROPH/DIAG INJ SC/IM: CPT | Performed by: HOSPITALIST

## 2023-03-02 PROCEDURE — 85025 COMPLETE CBC W/AUTO DIFF WBC: CPT | Performed by: HOSPITALIST

## 2023-03-02 PROCEDURE — 99223 PR INITIAL HOSPITAL CARE,LEVL III: ICD-10-PCS | Mod: ,,, | Performed by: FAMILY MEDICINE

## 2023-03-02 PROCEDURE — 36415 COLL VENOUS BLD VENIPUNCTURE: CPT | Performed by: HOSPITALIST

## 2023-03-02 PROCEDURE — 84100 ASSAY OF PHOSPHORUS: CPT | Performed by: HOSPITALIST

## 2023-03-02 PROCEDURE — 80053 COMPREHEN METABOLIC PANEL: CPT | Performed by: HOSPITALIST

## 2023-03-02 PROCEDURE — 99223 1ST HOSP IP/OBS HIGH 75: CPT | Mod: ,,, | Performed by: INTERNAL MEDICINE

## 2023-03-02 PROCEDURE — 80061 LIPID PANEL: CPT | Performed by: HOSPITALIST

## 2023-03-02 PROCEDURE — 94761 N-INVAS EAR/PLS OXIMETRY MLT: CPT

## 2023-03-02 PROCEDURE — 99223 1ST HOSP IP/OBS HIGH 75: CPT | Mod: ,,, | Performed by: FAMILY MEDICINE

## 2023-03-02 RX ADMIN — MUPIROCIN: 20 OINTMENT TOPICAL at 08:03

## 2023-03-02 RX ADMIN — HEPARIN SODIUM 5000 UNITS: 5000 INJECTION INTRAVENOUS; SUBCUTANEOUS at 06:03

## 2023-03-02 RX ADMIN — MUPIROCIN: 20 OINTMENT TOPICAL at 09:03

## 2023-03-02 RX ADMIN — HEPARIN SODIUM 5000 UNITS: 5000 INJECTION INTRAVENOUS; SUBCUTANEOUS at 09:03

## 2023-03-02 RX ADMIN — HEPARIN SODIUM 5000 UNITS: 5000 INJECTION INTRAVENOUS; SUBCUTANEOUS at 01:03

## 2023-03-02 NOTE — PROGRESS NOTES
Notified MD of pt going in and out of Afib RVR (150s) per telemetry  Orders received to discontinue tx  Net  mL  Pt denies complaints       03/01/23 2048 03/01/23 2055   Handoff Report   Received From  --  Sharon Arreguin   Given To  --  Glenda   Vital Signs   Temp  --  97 °F (36.1 °C)   Pulse  --  95   Resp  --  18   SpO2  --  97 %   BP  --  125/70   Assessments (Pre/Post)   Blood Liters Processed (BLP)  --  8   Transport Modality  --  not applicable   Level of Consciousness (AVPU)  --  alert   Dialyzer Clearance  --  mildly streaked   Post-Hemodialysis Assessment   Rinseback Volume (mL)  --  250 mL   Blood Volume Processed (Liters)  --  8 L   Dialyzer Clearance  --  Lightly streaked   Duration of Treatment  --  33 minutes   Additional Fluid Intake (mL)  --  500 mL   Total UF (mL)  --  746 mL   Net Fluid Removal  --  246   Patient Response to Treatment  --  see note   Post-Treatment Weight  --  119.7 kg (263 lb 14.3 oz)   Treatment Weight Change  --  -0.2   Arterial bleeding stop time (min)  --  5 min   Venous bleeding stop time (min)  --  5 min   Post-Hemodialysis Comments  --  Tx stopped per MD, all blood reinfused per protocol

## 2023-03-02 NOTE — CONSULTS
INPATIENT NEPHROLOGY CONSULT   [facility] NEPHROLOGY    Isabella Boone  03/02/2023    Reason for consultation:    esrd    Chief Complaint:   Chief Complaint   Patient presents with    Irregular Heart Beat     Pt went into SVT while doing her dialysis treatment.  Pt tx to ed via ems.  Ems advised pt converted from SVT during transport with no interventions.            History of Present Illness:    Per H and P     Isabella Boone is a 77 y.o. female who presents to the ED via EMS with an irregular heartbeat when she was at dialysis before arriving to the ED. History obtained from an independent historian: EMS states that the patient was in the process of completing dialysis when her heart rate went to 160 bpm. The patient denies chest pain, SOB, or any other symptoms at this time. She reports making very little urine as her baseline. The patient has a PMHx of CHF, DM, and HTN. Patient lives at [facility] in [place], [place].      On workup, CXR significant for pulmonary edema. EKG was NSR. No hyperkalemia present and CBC in normal range. Nephrology was consulted with plans to do HD. The patient will be monitored on telemetry    3/2  HD stopped earlier due to AFIB rvr.   No nausea, chest pain, sob, fever, urinary or bowel complaint, new neurologic symptoms, new joint pain      Plan of Care:       Assessment:      esrd  --continue dialysis per routine via avf  --fluid restrict  --renal dose medication per routine  --continue outpt medication  --continue binders with meals    Anemia  --erythropoiesis stimulating agent with renal replacement therapy if hg goes below 10    Hyperphosphatemia--normal today  --renal diet  --continue binders if phos goes above 5.5    Hypertension  --uf with hd  --fluid restrict  --low salt diet  --continue home medication    Edema  --fluid restrict  --uf as tolerated at hd    Afib  --cardiology consulted  --on heparin           Thank you for allowing us to participate in this patient's care. We  will continue to follow.    Vital Signs:  Temp Readings from Last 3 Encounters:   03/02/23 97.9 °F (36.6 °C)   09/27/17 97 °F (36.1 °C)       Pulse Readings from Last 3 Encounters:   03/02/23 89   09/27/17 70       BP Readings from Last 3 Encounters:   03/02/23 129/63   09/27/17 (!) 116/59       Weight:  Wt Readings from Last 3 Encounters:   03/01/23 119.9 kg (264 lb 5.3 oz)   09/22/17 123.4 kg (272 lb)       Past Medical & Surgical History:  Past Medical History:   Diagnosis Date    Arthritis     CHF (congestive heart failure)     Diabetes mellitus     Encounter for blood transfusion     Hypertension        Past Surgical History:   Procedure Laterality Date    EYE SURGERY      cataracts    HYSTERECTOMY      patial        Past Social History:  Social History     Socioeconomic History    Marital status: Single   Tobacco Use    Smoking status: Never    Smokeless tobacco: Never   Substance and Sexual Activity    Alcohol use: No    Drug use: No    Sexual activity: Never       Medications:  No current facility-administered medications on file prior to encounter.     Current Outpatient Medications on File Prior to Encounter   Medication Sig Dispense Refill    ascorbic acid, vitamin C, (VITAMIN C) 500 MG tablet Take 500 mg by mouth once daily. Give 2 tablets daily      calcium carbonate (TUMS) 200 mg calcium (500 mg) chewable tablet Take 1 tablet by mouth once daily. Take 2 tablets with meals and 3 tablets at night      gabapentin (NEURONTIN) 100 MG capsule Take 100 mg by mouth every evening.      loratadine (CLARITIN) 10 mg tablet Take 10 mg by mouth once daily.      megestroL (MEGACE) 400 mg/10 mL (40 mg/mL) Susp Take 400 mg by mouth 2 (two) times daily.      midodrine (PROAMATINE) 5 MG Tab Take 5 mg by mouth every 12 (twelve) hours.      VIT BCOMP,C/FOLIC ACID/ZINC (B COMPLEX-C-FOLIC ACID-ZN ORAL) Take by mouth.      hydrocodone-acetaminophen 5-325mg (NORCO) 5-325 mg per tablet Take 1 tablet by mouth every 6 (six) hours  "as needed. 15 tablet 0    ipratropium (ATROVENT HFA) 17 mcg/actuation inhaler Inhale 1 puff into the lungs every 6 (six) hours as needed for Wheezing. Rescue      oxyCODONE (OXY-IR) 5 mg Cap Take 5 mg by mouth every 4 (four) hours as needed for Pain. Give 0.5 tablet by mouth every 4hrs as needed for pain       Scheduled Meds:   heparin (porcine)  5,000 Units Subcutaneous Q8H    mupirocin   Nasal BID    senna-docusate 8.6-50 mg  1 tablet Oral BID     Continuous Infusions:  PRN Meds:.acetaminophen, acetaminophen, dextrose 10%, dextrose 10%, dextrose, dextrose, glucagon (human recombinant), HYDROcodone-acetaminophen, HYDROcodone-acetaminophen, melatonin, naloxone, ondansetron, sodium chloride 0.9%    Allergies:  Ace inhibitors, Betadine [povidone-iodine], Dye, and Gentamicin    Past Family History:  Reviewed; refer to Hospitalist Admission Note    Review of Systems:  Review of Systems - All 14 systems reviewed and negative, except as noted in HPI    Physical Exam:    /63   Pulse 89   Temp 97.9 °F (36.6 °C)   Resp 16   Ht 5' 5" (1.651 m)   Wt 119.9 kg (264 lb 5.3 oz)   LMP  (LMP Unknown)   SpO2 (!) 94%   Breastfeeding No   BMI 43.99 kg/m²     General Appearance:    Alert, cooperative, no distress, appears stated age   Head:    Normocephalic, without obvious abnormality, atraumatic   Eyes:    PER, conjunctiva/corneas clear, EOM's intact in both eyes        Throat:   Lips, mucosa, and tongue normal; teeth and gums normal   Back:     Symmetric, no curvature, ROM normal, no CVA tenderness   Lungs:     Clear to auscultation bilaterally, respirations unlabored   Chest wall:    No tenderness or deformity   Heart:    Regular rate and rhythm, S1 and S2 normal, no murmur, rub   or gallop   Abdomen:     Soft, non-tender, bowel sounds active all four quadrants,     no masses, no organomegaly   Extremities:   Extremities normal, atraumatic, no cyanosis.  2 plus edema   Pulses:   2+ and symmetric all extremities   MSK: "   No joint or muscle swelling, tenderness or deformity   Skin:   Skin color, texture, turgor normal, no rashes or lesions   Neurologic:   CNII-XII intact, normal strength and sensation       Throughout.  No flap     Results:  Lab Results   Component Value Date     03/02/2023    K 4.4 03/02/2023     03/02/2023    CO2 24 03/02/2023    BUN 30 (H) 03/02/2023    CREATININE 5.3 (H) 03/02/2023    CALCIUM 7.8 (L) 03/02/2023    ANIONGAP 12 03/02/2023    ESTGFRAFRICA 5 (A) 09/27/2017    EGFRNONAA 4 (A) 09/27/2017       Lab Results   Component Value Date    CALCIUM 7.8 (L) 03/02/2023    PHOS 3.7 03/02/2023       Recent Labs   Lab 03/02/23  0435   WBC 7.51   RBC 3.71*   HGB 11.5*   HCT 35.7*      MCV 96   MCH 31.0   MCHC 32.2        Imaging Results              X-Ray Chest AP Portable (Final result)  Result time 03/01/23 16:43:22      Final result by Candy Diaz MD (03/01/23 16:43:22)                   Impression:      Central pulmonary vascular congestion and probable mild pulmonary edema.      Electronically signed by: Candy Diaz  Date:    03/01/2023  Time:    16:43               Narrative:    EXAMINATION:  XR CHEST AP PORTABLE    CLINICAL HISTORY:  Tachycardia, unspecified    TECHNIQUE:  Single view of the chest was obtained.    COMPARISON:  None    FINDINGS:  Low lung volumes accentuating the cardiomediastinal contour.  Central pulmonary vascular congestion.  Atherosclerotic calcification of the aortic arch.  Perihilar and basilar predominant central opacities.  No large pleural effusion or pneumothorax.                                    Patient care was time spent personally by me on the following activities:   Obtaining a history  Examination of patient.  Providing medical care at the patients bedside.  Developing a treatment plan with patient or surrogate and bedside caregivers  Ordering and reviewing laboratory studies, radiographic studies, pulse oximetry.  Ordering and performing treatments  and interventions.  Evaluation of patient's response to treatment.  Discussions with consultants while on the unit and immediately available to the patient.  Re-evaluation of the patient's condition.  Documentation in the medical record.     Jesse Collier MD  Nephrology  [facility]  [phone]

## 2023-03-02 NOTE — NURSING
Nurses Note -- 4 Eyes      3/1/2023   10:04 PM      Skin assessed during: Admit      [] No Pressure Injuries Present    []Prevention Measures Documented      [x] Yes- Altered Skin Integrity Present or Discovered   [x] LDA Added if Not in Epic (Describe Wound)   [x] New Altered Skin Integrity was Present on Admit and Documented in LDA   [x] Wound Image Taken    Wound Care Consulted? Yes    Attending Nurse:  Geovanna Molina RN     Second RN/Staff Member:  Cele Cyr RN/OC

## 2023-03-02 NOTE — CONSULTS
Wound care consulted for multiple stage 2 wounds present on admit. Assessment at bedside with Dr. Millan. Patient complains of pain to her right lower back area which has the appearance of a keloid scar but the area is very sensitive and has hardening noted. The patient also has DTI to bilateral heels present on admit. Patient has a stage 2 open wounds at her anal area, bilateral buttocks, right medial thigh, left posterior upper thigh. There area splits in the skin to bilateral groins, entire abdominal fold areas from moisture associated dermatitis. Triad applied at all areas and left open to air. Air pump added to patients mattress for maximum pressure relief.       Left posterior upper thigh stage 2    Right medial thigh stage 2    Left abdominal fold with moles noted as well as MASD    Right abdominal fold with MASD      Left heel Purple- DTI    Right heel Purple- DTI

## 2023-03-02 NOTE — ASSESSMENT & PLAN NOTE
Patient with Paroxysmal (<7 days) atrial fibrillation which is controlled currently with no meds. Patient is currently in sinus rhythm.[id number] Score: 3. HASBLED Score. Anticoagulation not indicated due to NSR.

## 2023-03-02 NOTE — SUBJECTIVE & OBJECTIVE
Past Medical History:   Diagnosis Date    Arthritis     CHF (congestive heart failure)     Diabetes mellitus     Encounter for blood transfusion     Hypertension        Past Surgical History:   Procedure Laterality Date    EYE SURGERY      cataracts    HYSTERECTOMY      patial        Review of patient's allergies indicates:   Allergen Reactions    Ace inhibitors Other (See Comments)    Betadine [povidone-iodine] Rash    Dye Other (See Comments)    Gentamicin Rash       No current facility-administered medications on file prior to encounter.     Current Outpatient Medications on File Prior to Encounter   Medication Sig    ascorbic acid, vitamin C, (VITAMIN C) 500 MG tablet Take 500 mg by mouth once daily. Give 2 tablets daily    calcium carbonate (TUMS) 200 mg calcium (500 mg) chewable tablet Take 1 tablet by mouth once daily. Take 2 tablets with meals and 3 tablets at night    gabapentin (NEURONTIN) 100 MG capsule Take 100 mg by mouth every evening.    loratadine (CLARITIN) 10 mg tablet Take 10 mg by mouth once daily.    megestroL (MEGACE) 400 mg/10 mL (40 mg/mL) Susp Take 400 mg by mouth 2 (two) times daily.    midodrine (PROAMATINE) 5 MG Tab Take 5 mg by mouth every 12 (twelve) hours.    VIT BCOMP,C/FOLIC ACID/ZINC (B COMPLEX-C-FOLIC ACID-ZN ORAL) Take by mouth.    hydrocodone-acetaminophen 5-325mg (NORCO) 5-325 mg per tablet Take 1 tablet by mouth every 6 (six) hours as needed.    ipratropium (ATROVENT HFA) 17 mcg/actuation inhaler Inhale 1 puff into the lungs every 6 (six) hours as needed for Wheezing. Rescue    oxyCODONE (OXY-IR) 5 mg Cap Take 5 mg by mouth every 4 (four) hours as needed for Pain. Give 0.5 tablet by mouth every 4hrs as needed for pain     Family History       Problem Relation (Age of Onset)    Arthritis Mother    Diabetes Mother, Daughter    Kidney disease Son, Son          Tobacco Use    Smoking status: Never    Smokeless tobacco: Never   Substance and Sexual Activity    Alcohol use: No     Drug use: No    Sexual activity: Never     Review of Systems   Constitutional: Negative.    HENT: Negative.     Respiratory: Negative.     Cardiovascular:  Positive for leg swelling.   Gastrointestinal: Negative.    Genitourinary:  Positive for enuresis.   Musculoskeletal:  Positive for arthralgias, back pain and gait problem.   Skin:  Positive for wound.   Neurological:  Positive for weakness.   Psychiatric/Behavioral: Negative.     Objective:     Vital Signs (Most Recent):  Temp: 97 °F (36.1 °C) (03/01/23 2055)  Pulse: 95 (03/01/23 2055)  Resp: 18 (03/01/23 2055)  BP: 125/70 (03/01/23 2055)  SpO2: 97 % (03/01/23 2055) Vital Signs (24h Range):  Temp:  [97 °F (36.1 °C)-98.6 °F (37 °C)] 97 °F (36.1 °C)  Pulse:  [] 95  Resp:  [17-20] 18  SpO2:  [94 %-97 %] 97 %  BP: (106-154)/(63-87) 125/70     Weight: 119.9 kg (264 lb 5.3 oz)  Body mass index is 43.99 kg/m².    Physical Exam  Constitutional:       General: She is not in acute distress.     Appearance: She is obese.   HENT:      Head: Normocephalic and atraumatic.      Nose: Nose normal. No congestion.      Mouth/Throat:      Mouth: Mucous membranes are dry.      Pharynx: Oropharynx is clear.   Eyes:      Extraocular Movements: Extraocular movements intact.      Conjunctiva/sclera: Conjunctivae normal.   Neck:      Vascular: No carotid bruit.   Cardiovascular:      Rate and Rhythm: Normal rate and regular rhythm.      Pulses: Normal pulses.      Heart sounds: Murmur heard.   Pulmonary:      Effort: Pulmonary effort is normal. No respiratory distress.      Breath sounds: Rales present.   Abdominal:      General: Bowel sounds are normal. There is no distension.      Palpations: Abdomen is soft.      Tenderness: There is no abdominal tenderness.   Musculoskeletal:      Cervical back: Normal range of motion and neck supple.   Skin:     General: Skin is warm and dry.      Capillary Refill: Capillary refill takes 2 to 3 seconds.   Neurological:      General: No focal  deficit present.      Mental Status: She is alert and oriented to person, place, and time. Mental status is at baseline.   Psychiatric:         Mood and Affect: Mood normal.         Behavior: Behavior normal.           Significant Labs: All pertinent labs within the past 24 hours have been reviewed.  A1C: No results for input(s): HGBA1C in the last 4320 hours.  CBC:   Recent Labs   Lab 03/01/23  1642   WBC 8.48   HGB 12.5   HCT 38.2        CMP:   Recent Labs   Lab 03/01/23  1642      K 4.5      CO2 22*   *   BUN 28*   CREATININE 5.1*   CALCIUM 8.2*   PROT 7.5   ALBUMIN 2.8*   BILITOT 0.5   ALKPHOS 41*   AST 20   ALT 16   ANIONGAP 14     Cardiac Markers:   Recent Labs   Lab 03/01/23  1833   *     Coagulation: No results for input(s): PT, INR, APTT in the last 48 hours.  Lipid Panel: No results for input(s): CHOL, HDL, LDLCALC, TRIG, CHOLHDL in the last 48 hours.  Magnesium: No results for input(s): MG in the last 48 hours.  POCT Glucose: No results for input(s): POCTGLUCOSE in the last 48 hours.  Troponin:   Recent Labs   Lab 03/01/23  1642   TROPONINI 0.074*     TSH:   Recent Labs   Lab 03/01/23  1833   TSH 2.225       Significant Imaging: I have reviewed all pertinent imaging results/findings within the past 24 hours.

## 2023-03-02 NOTE — PLAN OF CARE
Pt arrived to the floor just before 2000 on 3/1/23, pt oriented to room, how to use the call light, and purpose of bed alarm being on. Reviewed PoC with pt, they verbalized understanding and willingness to participate. Attempted dialysis on pt, pt's HR went to the 150's and sustained, had to cut dialysis short per 's orders. VSS, NAD at this time, no c/o pain, will continue to monitor.

## 2023-03-02 NOTE — CONSULTS
Chief complaint:  Irregular Heart Beat (Pt went into SVT while doing her dialysis treatment.  Pt tx to ed via ems.  Ems advised pt converted from SVT during transport with no interventions.  )      HPI:  Isabella Boone is a 77 y.o. female presenting with multiple open wounds to the BLE, buttocks and right low back and moisture associated dermatitis of the BL groin and abdominal fold. Pt arrived with all of the abocve wounds, after being brought to ED for irregular heartbeat at dialysis. No other complaints today    PMH:  As per HPI and below:  Past Medical History:   Diagnosis Date    Arthritis     CHF (congestive heart failure)     Diabetes mellitus     Encounter for blood transfusion     Hypertension        Social History     Socioeconomic History    Marital status: Single   Tobacco Use    Smoking status: Never    Smokeless tobacco: Never   Substance and Sexual Activity    Alcohol use: No    Drug use: No    Sexual activity: Never       Past Surgical History:   Procedure Laterality Date    EYE SURGERY      cataracts    HYSTERECTOMY      patial        Family History   Problem Relation Age of Onset    Arthritis Mother     Diabetes Mother     Diabetes Daughter     Kidney disease Son     Kidney disease Son        Review of patient's allergies indicates:   Allergen Reactions    Ace inhibitors Other (See Comments)    Betadine [povidone-iodine] Rash    Dye Other (See Comments)    Gentamicin Rash       No current facility-administered medications on file prior to encounter.     Current Outpatient Medications on File Prior to Encounter   Medication Sig Dispense Refill    ascorbic acid, vitamin C, (VITAMIN C) 500 MG tablet Take 500 mg by mouth once daily. Give 2 tablets daily      calcium carbonate (TUMS) 200 mg calcium (500 mg) chewable tablet Take 1 tablet by mouth once daily. Take 2 tablets with meals and 3 tablets at night      gabapentin (NEURONTIN) 100 MG capsule Take 100 mg by mouth every evening.      loratadine  "(CLARITIN) 10 mg tablet Take 10 mg by mouth once daily.      megestroL (MEGACE) 400 mg/10 mL (40 mg/mL) Susp Take 400 mg by mouth 2 (two) times daily.      midodrine (PROAMATINE) 5 MG Tab Take 5 mg by mouth every 12 (twelve) hours.      VIT BCOMP,C/FOLIC ACID/ZINC (B COMPLEX-C-FOLIC ACID-ZN ORAL) Take by mouth.      hydrocodone-acetaminophen 5-325mg (NORCO) 5-325 mg per tablet Take 1 tablet by mouth every 6 (six) hours as needed. 15 tablet 0    ipratropium (ATROVENT HFA) 17 mcg/actuation inhaler Inhale 1 puff into the lungs every 6 (six) hours as needed for Wheezing. Rescue      oxyCODONE (OXY-IR) 5 mg Cap Take 5 mg by mouth every 4 (four) hours as needed for Pain. Give 0.5 tablet by mouth every 4hrs as needed for pain         ROS: As per HPI and below:  Pertinent items are noted in HPI.      Physical Exam:     Vitals:    23 0000 23 0346 23 0736 23 0800   BP:  (!) 118/58  129/63   BP Location:       Patient Position:       Pulse: 92 88 90 89   Resp:  14 16 16   Temp:  97.9 °F (36.6 °C)  97.9 °F (36.6 °C)   TempSrc:  Tympanic     SpO2:  95% (!) 94% (!) 94%   Weight:       Height:           BP  Min: 106/65  Max: 154/87  Temp  Av.8 °F (36.6 °C)  Min: 97 °F (36.1 °C)  Max: 98.6 °F (37 °C)  Pulse  Av.4  Min: 88  Max: 125  Resp  Av.5  Min: 14  Max: 20  SpO2  Av.2 %  Min: 94 %  Max: 97 %  Height  Av' 5" (165.1 cm)  Min: 5' 5" (165.1 cm)  Max: 5' 5" (165.1 cm)  Weight  Av.6 kg (268 lb 2.7 oz)  Min: 119.9 kg (264 lb 5.3 oz)  Max: 123.4 kg (272 lb)    Body mass index is 43.99 kg/m².          General:             Well developed, well nourished, no apparent distress  HEENT:              NCAT, no JVD, mucous membranes moist, EOM intact  Cardiovascular:  Regular rate and rhythm, normal S1, normal S2, No murmurs, rubs, or gallops  Respiratory:        Normal breath sounds, no wheezes, no rales, no rhonchi  Abdomen:           Bowel sounds present, non tender, non distended, no " masses, no hepatojugular reflux  Extremities:        No clubbing, no cyanosis, no edema  Vascular:            2+ b/l radial.  Peripheral pulses intact.  No carotid bruits.  Neurological:      No focal deficits  Skin:                   1. Stage 2 pressure ulcer of the left posterior thigh                              2. Right medial thigh stage 2 pressure ulcer                              3. Ela-anal stage 2 pressure ulcer                              4. BL buttock stage 2 pressure ulcers                              5. Right low back stage 2 with raised tender area                              6. BL heel DTI                              7. BL groin and abdominal fold moisture associated dermatitis                                      Lab Results   Component Value Date    WBC 7.51 03/02/2023    HGB 11.5 (L) 03/02/2023    HCT 35.7 (L) 03/02/2023    MCV 96 03/02/2023     03/02/2023     Lab Results   Component Value Date    CHOL 127 03/02/2023     Lab Results   Component Value Date    HDL 28 (L) 03/02/2023     Lab Results   Component Value Date    LDLCALC 85.2 03/02/2023     Lab Results   Component Value Date    TRIG 69 03/02/2023     Lab Results   Component Value Date    CHOLHDL 22.0 03/02/2023     CMP  Recent Labs   Lab 03/02/23  0435   *   CALCIUM 7.8*   ALBUMIN 2.5*   PROT 6.7      K 4.4   CO2 24      BUN 30*   CREATININE 5.3*   ALKPHOS 37*   ALT 13   AST 17   BILITOT 0.5      Lab Results   Component Value Date    TSH 2.225 03/01/2023       Assessment and Recommendations       Diagnoses:    1. Stage 2 pressure ulcer of the left posterior thigh  2. Right medial thigh stage 2 pressure ulcer  3. Ela-anal stage 2 pressure ulcer  4. BL buttock stage 2 pressure ulcers  5. Right low back stage 2 with raised tender area  6. BL heel DTI  7. BL groin and abdominal fold moisture associated dermatitis    Plan:  1. Ultrasound of the right low back soft tissue  2. Triad to all pressure ulcers and  groin and abdominal fold  3. Will follow in hospital, FU on DC at wound center    Complexity:    medium

## 2023-03-02 NOTE — PLAN OF CARE
[facility]  Initial Discharge Assessment       Primary Care Provider: Tin Yousif MD    Admission Diagnosis: Paroxysmal atrial fibrillation [I48.0]  Pulmonary edema [J81.1]  Tachycardia [R00.0]  Irregular heartbeat [I49.9]  Atrial fibrillation with rapid ventricular response [I48.91]  Chest pain [R07.9]    Admission Date: 3/1/2023  Expected Discharge Date:     [place] assessment completed with pt at bedside. Pt is a resident at [facility]. Pt does dialysis MWF at [facility]. Pt to return to [facility] at [place]. CM following         Payor: MEDICARE / Plan: MEDICARE PART A & B / Product Type: Government /     Extended Emergency Contact Information  Primary Emergency Contact: Ezequiel Rivas   [place]  Mobile Phone: [phone]  Relation: Daughter    Discharge Plan A: Skilled Nursing Facility  Discharge Plan B: Skilled Nursing Facility    No Pharmacies Listed    Initial Assessment (most recent)       Adult Discharge Assessment - 03/02/23 1358          Discharge Assessment    Assessment Type Discharge Planning Assessment     Confirmed/corrected address, phone number and insurance Yes     Confirmed Demographics Correct on Facesheet     Source of Information patient     People in Home child(philip), adult     Do you expect to return to your current living situation? Yes     Do you have help at home or someone to help you manage your care at home? Yes     Prior to hospitilization cognitive status: Alert/Oriented     Current cognitive status: Alert/Oriented     Walking or Climbing Stairs ambulation difficulty, requires equipment     Dressing/Bathing bathing difficulty, requires equipment     Equipment Currently Used at Home walker, rolling     Readmission within 30 days? Yes     Patient currently being followed by outpatient case management? No     Do you currently have service(s) that help you manage your care at home? No     Do you take prescription medications? Yes     Do you have  prescription coverage? Yes     Do you have any problems affording any of your prescribed medications? No     Is the patient taking medications as prescribed? yes     How do you get to doctors appointments? family or friend will provide     Are you on dialysis? Yes     Dialysis Name and Scheduled days MWF @ [facility]     Do you take coumadin? No     Discharge Plan A Skilled Nursing Facility     Discharge Plan B Skilled Nursing Facility     DME Needed Upon Discharge  none     Discharge Plan discussed with: Patient

## 2023-03-02 NOTE — PLAN OF CARE
MARTIN signed.       03/02/23 1357   MARTIN Message   Medicare Outpatient and Observation Notification regarding financial responsibility Explained to patient/caregiver;Signed/date by patient/caregiver   Date MARTIN was signed 03/02/23   Time MARTIN was signed 1000

## 2023-03-02 NOTE — HPI
Isabella Boone is a 77 y.o. female who presents to the ED via EMS with an irregular heartbeat when she was at dialysis before arriving to the ED. History obtained from an independent historian: EMS states that the patient was in the process of completing dialysis when her heart rate went to 160 bpm. The patient denies chest pain, SOB, or any other symptoms at this time. She reports making very little urine as her baseline. The patient has a PMHx of CHF, DM, and HTN. Patient lives at [facility] in [place], [place].     On workup, CXR significant for pulmonary edema. EKG was NSR. No hyperkalemia present and CBC in normal range. Nephrology was consulted with plans to do HD. The patient will be monitored on telemetry.

## 2023-03-02 NOTE — PLAN OF CARE
Problem: Adult Inpatient Plan of Care  Goal: Plan of Care Review  Outcome: Ongoing, Progressing  Goal: Patient-Specific Goal (Individualized)  Outcome: Ongoing, Progressing  Goal: Absence of Hospital-Acquired Illness or Injury  Outcome: Ongoing, Progressing  Goal: Optimal Comfort and Wellbeing  Outcome: Ongoing, Progressing     Problem: Infection (Hemodialysis)  Goal: Absence of Infection Signs and Symptoms  Outcome: Ongoing, Progressing     Problem: Hemodynamic Instability (Hemodialysis)  Goal: Effective Tissue Perfusion  Outcome: Ongoing, Progressing     Problem: Impaired Wound Healing  Goal: Optimal Wound Healing  Outcome: Ongoing, Progressing    Reviewed POC with patient, patient verbalized understanding. Triad applied to all wound and left open to air. Air pump on mattress for pressure relief. Foam boots applied to feet. Bed in low position, brake applied, and call light in reach.

## 2023-03-02 NOTE — ASSESSMENT & PLAN NOTE
Body mass index is 43.99 kg/m². Morbid obesity complicates all aspects of disease management from diagnostic modalities to treatment. Weight loss encouraged and health benefits explained to patient.

## 2023-03-02 NOTE — CARE UPDATE
03/01/23 2015   Patient Assessment/Suction   Level of Consciousness (AVPU) alert   Respiratory Effort Normal;Unlabored   Expansion/Accessory Muscles/Retractions no use of accessory muscles   Rhythm/Pattern, Respiratory unlabored;pattern regular   PRE-TX-O2   Device (Oxygen Therapy) room air   SpO2 96 %   Pulse Oximetry Type Intermittent   Pulse 90   Resp 18

## 2023-03-02 NOTE — H&P
[facility] Hospital Medicine  History & Physical    Patient Name: Isabella Boone  MRN: [id number]  Patient Class: OP- Observation  Admission Date: 3/1/2023  Attending Physician: Kelly Manzo MD   Primary Care Provider: Tin Yousif MD         Patient information was obtained from ER records.     Subjective:     Principal Problem:Paroxysmal atrial fibrillation    Chief Complaint:   Chief Complaint   Patient presents with    Irregular Heart Beat     Pt went into SVT while doing her dialysis treatment.  Pt tx to ed via ems.  Ems advised pt converted from SVT during transport with no interventions.          HPI: Isabella Boone is a 77 y.o. female who presents to the ED via EMS with an irregular heartbeat when she was at dialysis before arriving to the ED. History obtained from an independent historian: EMS states that the patient was in the process of completing dialysis when her heart rate went to 160 bpm. The patient denies chest pain, SOB, or any other symptoms at this time. She reports making very little urine as her baseline. The patient has a PMHx of CHF, DM, and HTN. Patient lives at [facility] in [place], [place].     On workup, CXR significant for pulmonary edema. EKG was NSR. No hyperkalemia present and CBC in normal range. Nephrology was consulted with plans to do HD. The patient will be monitored on telemetry.      Past Medical History:   Diagnosis Date    Arthritis     CHF (congestive heart failure)     Diabetes mellitus     Encounter for blood transfusion     Hypertension        Past Surgical History:   Procedure Laterality Date    EYE SURGERY      cataracts    HYSTERECTOMY      patial        Review of patient's allergies indicates:   Allergen Reactions    Ace inhibitors Other (See Comments)    Betadine [povidone-iodine] Rash    Dye Other (See Comments)    Gentamicin Rash       No current facility-administered medications on file prior to encounter.     Current  Outpatient Medications on File Prior to Encounter   Medication Sig    ascorbic acid, vitamin C, (VITAMIN C) 500 MG tablet Take 500 mg by mouth once daily. Give 2 tablets daily    calcium carbonate (TUMS) 200 mg calcium (500 mg) chewable tablet Take 1 tablet by mouth once daily. Take 2 tablets with meals and 3 tablets at night    gabapentin (NEURONTIN) 100 MG capsule Take 100 mg by mouth every evening.    loratadine (CLARITIN) 10 mg tablet Take 10 mg by mouth once daily.    megestroL (MEGACE) 400 mg/10 mL (40 mg/mL) Susp Take 400 mg by mouth 2 (two) times daily.    midodrine (PROAMATINE) 5 MG Tab Take 5 mg by mouth every 12 (twelve) hours.    VIT BCOMP,C/FOLIC ACID/ZINC (B COMPLEX-C-FOLIC ACID-ZN ORAL) Take by mouth.    hydrocodone-acetaminophen 5-325mg (NORCO) 5-325 mg per tablet Take 1 tablet by mouth every 6 (six) hours as needed.    ipratropium (ATROVENT HFA) 17 mcg/actuation inhaler Inhale 1 puff into the lungs every 6 (six) hours as needed for Wheezing. Rescue    oxyCODONE (OXY-IR) 5 mg Cap Take 5 mg by mouth every 4 (four) hours as needed for Pain. Give 0.5 tablet by mouth every 4hrs as needed for pain     Family History       Problem Relation (Age of Onset)    Arthritis Mother    Diabetes Mother, Daughter    Kidney disease Son, Son          Tobacco Use    Smoking status: Never    Smokeless tobacco: Never   Substance and Sexual Activity    Alcohol use: No    Drug use: No    Sexual activity: Never     Review of Systems   Constitutional: Negative.    HENT: Negative.     Respiratory: Negative.     Cardiovascular:  Positive for leg swelling.   Gastrointestinal: Negative.    Genitourinary:  Positive for enuresis.   Musculoskeletal:  Positive for arthralgias, back pain and gait problem.   Skin:  Positive for wound.   Neurological:  Positive for weakness.   Psychiatric/Behavioral: Negative.     Objective:     Vital Signs (Most Recent):  Temp: 97 °F (36.1 °C) (03/01/23 2055)  Pulse: 95 (03/01/23  2055)  Resp: 18 (03/01/23 2055)  BP: 125/70 (03/01/23 2055)  SpO2: 97 % (03/01/23 2055) Vital Signs (24h Range):  Temp:  [97 °F (36.1 °C)-98.6 °F (37 °C)] 97 °F (36.1 °C)  Pulse:  [] 95  Resp:  [17-20] 18  SpO2:  [94 %-97 %] 97 %  BP: (106-154)/(63-87) 125/70     Weight: 119.9 kg (264 lb 5.3 oz)  Body mass index is 43.99 kg/m².    Physical Exam  Constitutional:       General: She is not in acute distress.     Appearance: She is obese.   HENT:      Head: Normocephalic and atraumatic.      Nose: Nose normal. No congestion.      Mouth/Throat:      Mouth: Mucous membranes are dry.      Pharynx: Oropharynx is clear.   Eyes:      Extraocular Movements: Extraocular movements intact.      Conjunctiva/sclera: Conjunctivae normal.   Neck:      Vascular: No carotid bruit.   Cardiovascular:      Rate and Rhythm: Normal rate and regular rhythm.      Pulses: Normal pulses.      Heart sounds: Murmur heard.   Pulmonary:      Effort: Pulmonary effort is normal. No respiratory distress.      Breath sounds: Rales present.   Abdominal:      General: Bowel sounds are normal. There is no distension.      Palpations: Abdomen is soft.      Tenderness: There is no abdominal tenderness.   Musculoskeletal:      Cervical back: Normal range of motion and neck supple.   Skin:     General: Skin is warm and dry.      Capillary Refill: Capillary refill takes 2 to 3 seconds.   Neurological:      General: No focal deficit present.      Mental Status: She is alert and oriented to person, place, and time. Mental status is at baseline.   Psychiatric:         Mood and Affect: Mood normal.         Behavior: Behavior normal.           Significant Labs: All pertinent labs within the past 24 hours have been reviewed.  A1C: No results for input(s): HGBA1C in the last 4320 hours.  CBC:   Recent Labs   Lab 03/01/23  1642   WBC 8.48   HGB 12.5   HCT 38.2        CMP:   Recent Labs   Lab 03/01/23  1642      K 4.5      CO2 22*   *    BUN 28*   CREATININE 5.1*   CALCIUM 8.2*   PROT 7.5   ALBUMIN 2.8*   BILITOT 0.5   ALKPHOS 41*   AST 20   ALT 16   ANIONGAP 14     Cardiac Markers:   Recent Labs   Lab 03/01/23  1833   *     Coagulation: No results for input(s): PT, INR, APTT in the last 48 hours.  Lipid Panel: No results for input(s): CHOL, HDL, LDLCALC, TRIG, CHOLHDL in the last 48 hours.  Magnesium: No results for input(s): MG in the last 48 hours.  POCT Glucose: No results for input(s): POCTGLUCOSE in the last 48 hours.  Troponin:   Recent Labs   Lab 03/01/23  1642   TROPONINI 0.074*     TSH:   Recent Labs   Lab 03/01/23  1833   TSH 2.225       Significant Imaging: I have reviewed all pertinent imaging results/findings within the past 24 hours.    Assessment/Plan:     * Paroxysmal atrial fibrillation  Patient with Paroxysmal (<7 days) atrial fibrillation which is controlled currently with no meds. Patient is currently in sinus rhythm.[id number] Score: 3. HASBLED Score. Anticoagulation not indicated due to NSR.        ESRD (end stage renal disease)  Consult nephrology  HD MWF        Hyperkalemia  No evidence on admit labs  HD per nephrology      Morbid obesity due to excess calories  Body mass index is 43.99 kg/m². Morbid obesity complicates all aspects of disease management from diagnostic modalities to treatment. Weight loss encouraged and health benefits explained to patient.           VTE Risk Mitigation (From admission, onward)         Ordered     heparin (porcine) injection 5,000 Units  Every 8 hours         03/01/23 1808     IP VTE HIGH RISK PATIENT  Once         03/01/23 1808     Place sequential compression device  Until discontinued         03/01/23 1808                   Kelly Manzo MD  Department of Hospital Medicine   [facility]

## 2023-03-02 NOTE — NURSING
Spoke with Dr. Collier regarding verbal consent for patient to receive Dialysis upon arrival to the floor. Dr. Collier gave the okay/verbal consent for patient to receive HD.

## 2023-03-02 NOTE — PLAN OF CARE
03/02/23 0736   Patient Assessment/Suction   Level of Consciousness (AVPU) alert   Respiratory Effort Normal;Unlabored   Expansion/Accessory Muscles/Retractions no retractions;no use of accessory muscles   Rhythm/Pattern, Respiratory depth regular;pattern regular;unlabored   Cough Frequency no cough   PRE-TX-O2   Device (Oxygen Therapy) room air   SpO2 (!) 94 %   Pulse Oximetry Type Intermittent   $ Pulse Oximetry - Multiple Charge Pulse Oximetry - Multiple   Pulse 90   Resp 16

## 2023-03-03 PROBLEM — J81.1 CHRONIC PULMONARY EDEMA: Status: ACTIVE | Noted: 2023-03-03

## 2023-03-03 LAB
ALBUMIN SERPL BCP-MCNC: 2.6 G/DL (ref 3.5–5.2)
ALP SERPL-CCNC: 35 U/L (ref 55–135)
ALT SERPL W/O P-5'-P-CCNC: 11 U/L (ref 10–44)
ANION GAP SERPL CALC-SCNC: 13 MMOL/L (ref 8–16)
AST SERPL-CCNC: 18 U/L (ref 10–40)
BASOPHILS # BLD AUTO: 0.02 K/UL (ref 0–0.2)
BASOPHILS NFR BLD: 0.3 % (ref 0–1.9)
BILIRUB SERPL-MCNC: 0.5 MG/DL (ref 0.1–1)
BUN SERPL-MCNC: 43 MG/DL (ref 8–23)
CALCIUM SERPL-MCNC: 7.8 MG/DL (ref 8.7–10.5)
CHLORIDE SERPL-SCNC: 104 MMOL/L (ref 95–110)
CO2 SERPL-SCNC: 24 MMOL/L (ref 23–29)
CREAT SERPL-MCNC: 6.9 MG/DL (ref 0.5–1.4)
DIFFERENTIAL METHOD: ABNORMAL
EOSINOPHIL # BLD AUTO: 0.2 K/UL (ref 0–0.5)
EOSINOPHIL NFR BLD: 2.5 % (ref 0–8)
ERYTHROCYTE [DISTWIDTH] IN BLOOD BY AUTOMATED COUNT: 14 % (ref 11.5–14.5)
EST. GFR  (NO RACE VARIABLE): 6 ML/MIN/1.73 M^2
GLUCOSE SERPL-MCNC: 158 MG/DL (ref 70–110)
HBV SURFACE AB SER-ACNC: <3 MIU/ML
HBV SURFACE AB SER-ACNC: NORMAL M[IU]/ML
HCT VFR BLD AUTO: 36.4 % (ref 37–48.5)
HGB BLD-MCNC: 11.7 G/DL (ref 12–16)
IMM GRANULOCYTES # BLD AUTO: 0.06 K/UL (ref 0–0.04)
IMM GRANULOCYTES NFR BLD AUTO: 0.9 % (ref 0–0.5)
LYMPHOCYTES # BLD AUTO: 2.3 K/UL (ref 1–4.8)
LYMPHOCYTES NFR BLD: 33.8 % (ref 18–48)
MAGNESIUM SERPL-MCNC: 1.9 MG/DL (ref 1.6–2.6)
MCH RBC QN AUTO: 30.9 PG (ref 27–31)
MCHC RBC AUTO-ENTMCNC: 32.1 G/DL (ref 32–36)
MCV RBC AUTO: 96 FL (ref 82–98)
MONOCYTES # BLD AUTO: 0.9 K/UL (ref 0.3–1)
MONOCYTES NFR BLD: 13.9 % (ref 4–15)
NEUTROPHILS # BLD AUTO: 3.3 K/UL (ref 1.8–7.7)
NEUTROPHILS NFR BLD: 48.6 % (ref 38–73)
NRBC BLD-RTO: 0 /100 WBC
PHOSPHATE SERPL-MCNC: 4.9 MG/DL (ref 2.7–4.5)
PLATELET # BLD AUTO: 158 K/UL (ref 150–450)
PMV BLD AUTO: 10.9 FL (ref 9.2–12.9)
POTASSIUM SERPL-SCNC: 4.9 MMOL/L (ref 3.5–5.1)
PROT SERPL-MCNC: 6.8 G/DL (ref 6–8.4)
RBC # BLD AUTO: 3.79 M/UL (ref 4–5.4)
SODIUM SERPL-SCNC: 141 MMOL/L (ref 136–145)
WBC # BLD AUTO: 6.74 K/UL (ref 3.9–12.7)

## 2023-03-03 PROCEDURE — 25000003 PHARM REV CODE 250: Performed by: HOSPITALIST

## 2023-03-03 PROCEDURE — 20600001 HC STEP DOWN PRIVATE ROOM

## 2023-03-03 PROCEDURE — 80053 COMPREHEN METABOLIC PANEL: CPT | Performed by: HOSPITALIST

## 2023-03-03 PROCEDURE — 99233 PR SUBSEQUENT HOSPITAL CARE,LEVL III: ICD-10-PCS | Mod: ,,, | Performed by: INTERNAL MEDICINE

## 2023-03-03 PROCEDURE — 99233 SBSQ HOSP IP/OBS HIGH 50: CPT | Mod: ,,, | Performed by: INTERNAL MEDICINE

## 2023-03-03 PROCEDURE — 90935 HEMODIALYSIS ONE EVALUATION: CPT

## 2023-03-03 PROCEDURE — 84100 ASSAY OF PHOSPHORUS: CPT | Performed by: HOSPITALIST

## 2023-03-03 PROCEDURE — 83735 ASSAY OF MAGNESIUM: CPT | Performed by: HOSPITALIST

## 2023-03-03 PROCEDURE — 94761 N-INVAS EAR/PLS OXIMETRY MLT: CPT

## 2023-03-03 PROCEDURE — 36415 COLL VENOUS BLD VENIPUNCTURE: CPT | Performed by: HOSPITALIST

## 2023-03-03 PROCEDURE — 25000003 PHARM REV CODE 250: Performed by: INTERNAL MEDICINE

## 2023-03-03 PROCEDURE — 85025 COMPLETE CBC W/AUTO DIFF WBC: CPT | Performed by: HOSPITALIST

## 2023-03-03 RX ORDER — HEPARIN SODIUM 5000 [USP'U]/ML
5000 INJECTION, SOLUTION INTRAVENOUS; SUBCUTANEOUS
Status: CANCELLED | OUTPATIENT
Start: 2023-03-03

## 2023-03-03 RX ORDER — SODIUM CHLORIDE 9 MG/ML
INJECTION, SOLUTION INTRAVENOUS
Status: CANCELLED | OUTPATIENT
Start: 2023-03-03

## 2023-03-03 RX ORDER — SODIUM CHLORIDE 9 MG/ML
INJECTION, SOLUTION INTRAVENOUS ONCE
Status: CANCELLED | OUTPATIENT
Start: 2023-03-03 | End: 2023-03-03

## 2023-03-03 RX ORDER — METOPROLOL TARTRATE 1 MG/ML
2.5 INJECTION, SOLUTION INTRAVENOUS ONCE
Status: COMPLETED | OUTPATIENT
Start: 2023-03-03 | End: 2023-03-03

## 2023-03-03 RX ORDER — METOPROLOL SUCCINATE 25 MG/1
25 TABLET, EXTENDED RELEASE ORAL DAILY
Status: DISCONTINUED | OUTPATIENT
Start: 2023-03-04 | End: 2023-03-04

## 2023-03-03 RX ADMIN — METOPROLOL SUCCINATE 12.5 MG: 25 TABLET, EXTENDED RELEASE ORAL at 11:03

## 2023-03-03 RX ADMIN — APIXABAN 5 MG: 2.5 TABLET, FILM COATED ORAL at 11:03

## 2023-03-03 RX ADMIN — APIXABAN 5 MG: 2.5 TABLET, FILM COATED ORAL at 08:03

## 2023-03-03 RX ADMIN — METOROPROLOL TARTRATE 2.5 MG: 5 INJECTION, SOLUTION INTRAVENOUS at 09:03

## 2023-03-03 RX ADMIN — MUPIROCIN: 20 OINTMENT TOPICAL at 08:03

## 2023-03-03 RX ADMIN — MUPIROCIN: 20 OINTMENT TOPICAL at 09:03

## 2023-03-03 NOTE — NURSING
Dialysis done, pt transferred to SDU room 223. All pt belongings, including black purse,  and pt belongings bag given to pt.

## 2023-03-03 NOTE — PLAN OF CARE
Per MDR's the pts heart rate is not controlled and goes to the 150's during HD. Pt is not medically ready to return to [facility]. CM following.    03/03/23 1428   Discharge Assessment   Assessment Type Discharge Planning Reassessment   Confirmed/corrected address, phone number and insurance Yes   Discharge Plan A Return to nursing home   Discharge Plan B Return to Nursing Home

## 2023-03-03 NOTE — PLAN OF CARE
03/03/23 0801   Patient Assessment/Suction   Level of Consciousness (AVPU) alert   Respiratory Effort Normal;Unlabored   Expansion/Accessory Muscles/Retractions no retractions;no use of accessory muscles   Rhythm/Pattern, Respiratory depth regular;pattern regular;unlabored   Cough Frequency no cough   PRE-TX-O2   Device (Oxygen Therapy) room air   SpO2 (!) 94 %   Pulse Oximetry Type Intermittent   $ Pulse Oximetry - Multiple Charge Pulse Oximetry - Multiple   Pulse 97   Resp 16

## 2023-03-03 NOTE — PROGRESS NOTES
[facility] Hospital Medicine  Progress Note    Patient Name: Isabella Boone  MRN: [id number]  Patient Class: IP- Inpatient   Admission Date: 3/1/2023  Length of Stay: 0 days  Attending Physician: Kelly Manzo MD  Primary Care Provider: Tin Yousif MD        Subjective:     Principal Problem:Paroxysmal atrial fibrillation        HPI:  Isabella Boone is a 77 y.o. female who presents to the ED via EMS with an irregular heartbeat when she was at dialysis before arriving to the ED. History obtained from an independent historian: EMS states that the patient was in the process of completing dialysis when her heart rate went to 160 bpm. The patient denies chest pain, SOB, or any other symptoms at this time. She reports making very little urine as her baseline. The patient has a PMHx of CHF, DM, and HTN. Patient lives at [facility] in [place], [place].     On workup, CXR significant for pulmonary edema. EKG was NSR. No hyperkalemia present and CBC in normal range. Nephrology was consulted with plans to do HD. The patient will be monitored on telemetry.      Overview/Hospital Course:  Patient admitted from HD center with report of afib RVR during HD. On ED arrival she was NSR but CXR showed pulmonary edema. She has been attempted on HD x 2 and continues to have either SVT or atrial fibrillation. Cardiology was consulted. ECHO pending (system went down). Metoprolol dose increased and patient started on eliquis. Nephrology following and will re-attempt HD in am (3/4/23). Wound care addressing chronic wounds to sacrum/gluteal areas. Plan for dc to NH when medically stable.       Interval History: patient seen and examined; c/o chest pressure while on HD- evaluated patient with cardiology present. Metoprolol 2.5mg IV given and HR went from 150 to 90s. Transferred to stepdown unit for closer monitoring.     Review of Systems   Constitutional: Negative.    Respiratory: Negative.     Cardiovascular:   Positive for leg swelling.   Gastrointestinal: Negative.    Genitourinary:  Positive for enuresis.   Musculoskeletal:  Positive for arthralgias, back pain and gait problem.   Neurological:  Positive for weakness and numbness.   Psychiatric/Behavioral: Negative.     Objective:     Vital Signs (Most Recent):  Temp: 98.7 °F (37.1 °C) (03/03/23 1216)  Pulse: 92 (03/03/23 1200)  Resp: 19 (03/03/23 1216)  BP: (!) 140/65 (03/03/23 1200)  SpO2: 97 % (03/03/23 1200)   Vital Signs (24h Range):  Temp:  [96.9 °F (36.1 °C)-98.7 °F (37.1 °C)] 98.7 °F (37.1 °C)  Pulse:  [] 92  Resp:  [15-20] 19  SpO2:  [93 %-98 %] 97 %  BP: ()/(46-77) 140/65     Weight: 119.7 kg (264 lb)  Body mass index is 43.93 kg/m².    Intake/Output Summary (Last 24 hours) at 3/3/2023 1458  Last data filed at 3/3/2023 1036  Gross per 24 hour   Intake 340 ml   Output 783 ml   Net -443 ml        Physical Exam  Constitutional:       Appearance: Normal appearance. She is not ill-appearing.   HENT:      Head: Normocephalic and atraumatic.   Cardiovascular:      Rate and Rhythm: Normal rate and regular rhythm.      Pulses: Normal pulses.      Heart sounds: Murmur heard.   Pulmonary:      Effort: Pulmonary effort is normal.      Breath sounds: Rales present.   Abdominal:      General: Bowel sounds are normal. There is no distension.      Palpations: Abdomen is soft.      Tenderness: There is no abdominal tenderness.   Musculoskeletal:      Cervical back: Normal range of motion and neck supple.   Skin:     Capillary Refill: Capillary refill takes 2 to 3 seconds.   Neurological:      Mental Status: She is alert and oriented to person, place, and time. Mental status is at baseline.      Motor: Weakness present.      Gait: Gait abnormal.      Deep Tendon Reflexes: Reflexes abnormal.   Psychiatric:         Mood and Affect: Mood normal.         Behavior: Behavior normal.       Significant Labs: All pertinent labs within the past 24 hours have been  reviewed.  Recent Lab Results         03/03/23  0432        Albumin 2.6       Alkaline Phosphatase 35       ALT 11       Anion Gap 13       AST 18       Baso # 0.02       Basophil % 0.3       BILIRUBIN TOTAL 0.5  Comment: For infants and newborns, interpretation of results should be based  on gestational age, weight and in agreement with clinical  observations.    Premature Infant recommended reference ranges:  Up to 24 hours.............<8.0 mg/dL  Up to 48 hours............<12.0 mg/dL  3-5 days..................<15.0 mg/dL  6-29 days.................<15.0 mg/dL         BUN 43       Calcium 7.8       Chloride 104       CO2 24       Creatinine 6.9       Differential Method Automated       eGFR 6       Eos # 0.2       Eosinophil % 2.5       Glucose 158       Gran # (ANC) 3.3       Gran % 48.6       Hematocrit 36.4       Hemoglobin 11.7       Immature Grans (Abs) 0.06  Comment: Mild elevation in immature granulocytes is non specific and   can be seen in a variety of conditions including stress response,   acute inflammation, trauma and pregnancy. Correlation with other   laboratory and clinical findings is essential.         Immature Granulocytes 0.9       Lymph # 2.3       Lymph % 33.8       Magnesium 1.9       MCH 30.9       MCHC 32.1       MCV 96       Mono # 0.9       Mono % 13.9       MPV 10.9       nRBC 0       Phosphorus 4.9       Platelets 158       Potassium 4.9       PROTEIN TOTAL 6.8       RBC 3.79       RDW 14.0       Sodium 141       WBC 6.74               Significant Imaging: I have reviewed all pertinent imaging results/findings within the past 24 hours.      Assessment/Plan:      * Paroxysmal atrial fibrillation  Patient with Paroxysmal (<7 days) atrial fibrillation which is controlled currently with no meds. Patient is currently in sinus rhythm.[id number] Score: 6. HASBLED Score. Anticoagulation - eliquis     Metoprolol dose increased if BP tolerates   eliquis   ECHO results pending   Cardiology  consulted         ESRD (end stage renal disease)  Consult nephrology  HD MWF - home regimen  Failed attempts x 2 since admission  Re-attempt in am         Hyperkalemia  No evidence on admit labs  HD per nephrology      Chronic pulmonary edema  HD for volume control, though have not been able to successfully pull much volume off due to unstable hemodynamics  Patient no longer urinates so diuretics not helpful  Fluid restriction added to renal diet  RE-attempt HD in am       Morbid obesity due to excess calories  Body mass index is 43.93 kg/m². Morbid obesity complicates all aspects of disease management from diagnostic modalities to treatment. Weight loss encouraged and health benefits explained to patient.           VTE Risk Mitigation (From admission, onward)         Ordered     apixaban tablet 5 mg  2 times daily         03/02/23 2348     IP VTE HIGH RISK PATIENT  Once         03/01/23 1808     Place sequential compression device  Until discontinued         03/01/23 1808                Discharge Planning   HAN: 3/3/2023     Code Status: Full Code   Is the patient medically ready for discharge?:     Reason for patient still in hospital (select all that apply): Patient trending condition  Discharge Plan A: Return to nursing home                  Kelly Manzo MD  Department of Hospital Medicine   [facility]

## 2023-03-03 NOTE — PROGRESS NOTES
[facility] Hospital Medicine  Progress Note    Patient Name: Isabella Boone  MRN: [id number]  Patient Class: OP- Observation   Admission Date: 3/1/2023  Length of Stay: 0 days  Attending Physician: Kelly Manzo MD  Primary Care Provider: Tin Yousif MD        Subjective:     Principal Problem:Paroxysmal atrial fibrillation        HPI:  Isabella Boone is a 77 y.o. female who presents to the ED via EMS with an irregular heartbeat when she was at dialysis before arriving to the ED. History obtained from an independent historian: EMS states that the patient was in the process of completing dialysis when her heart rate went to 160 bpm. The patient denies chest pain, SOB, or any other symptoms at this time. She reports making very little urine as her baseline. The patient has a PMHx of CHF, DM, and HTN. Patient lives at [facility] in [place], [place].     On workup, CXR significant for pulmonary edema. EKG was NSR. No hyperkalemia present and CBC in normal range. Nephrology was consulted with plans to do HD. The patient will be monitored on telemetry.      Overview/Hospital Course:  Patient admitted from HD center with report of afib RVR during HD. In ED she was NSR. She was asymptomatic when in afib. She went back into afib as she tried to complete HD session in hospital. An ECHO is pending. Cardiology has made recommendation to start low dose metoprolol and eliquis. CHADS =6.  Wound care addressing chronic sacral/gluteal wounds.      Interval History: patient seen and examined; no c/o chest pain or palpitations     Review of Systems   Constitutional: Negative.    Respiratory: Negative.     Cardiovascular:  Positive for leg swelling.   Gastrointestinal: Negative.    Genitourinary:  Positive for enuresis.   Musculoskeletal:  Positive for arthralgias, back pain and gait problem.   Neurological:  Positive for weakness and numbness.   Psychiatric/Behavioral: Negative.     Objective:     Vital  Signs (Most Recent):  Temp: 98.6 °F (37 °C) (03/02/23 2324)  Pulse: 90 (03/02/23 2324)  Resp: 18 (03/02/23 2324)  BP: 105/64 (03/02/23 2324)  SpO2: (!) 93 % (03/02/23 2324)   Vital Signs (24h Range):  Temp:  [96.9 °F (36.1 °C)-98.6 °F (37 °C)] 98.6 °F (37 °C)  Pulse:  [88-98] 90  Resp:  [14-18] 18  SpO2:  [93 %-96 %] 93 %  BP: (105-134)/(58-64) 105/64     Weight: 119.9 kg (264 lb 5.3 oz)  Body mass index is 43.99 kg/m².    Intake/Output Summary (Last 24 hours) at 3/2/2023 2331  Last data filed at 3/2/2023 1100  Gross per 24 hour   Intake 240 ml   Output 450 ml   Net -210 ml      Physical Exam  Constitutional:       Appearance: Normal appearance. She is not ill-appearing.   HENT:      Head: Normocephalic and atraumatic.   Cardiovascular:      Rate and Rhythm: Normal rate and regular rhythm.      Pulses: Normal pulses.      Heart sounds: Murmur heard.   Pulmonary:      Effort: Pulmonary effort is normal.      Breath sounds: Rales present.   Abdominal:      General: Bowel sounds are normal. There is no distension.      Palpations: Abdomen is soft.      Tenderness: There is no abdominal tenderness.   Musculoskeletal:      Cervical back: Normal range of motion and neck supple.   Skin:     Capillary Refill: Capillary refill takes 2 to 3 seconds.   Neurological:      Mental Status: She is alert and oriented to person, place, and time. Mental status is at baseline.      Motor: Weakness present.      Gait: Gait abnormal.      Deep Tendon Reflexes: Reflexes abnormal.   Psychiatric:         Mood and Affect: Mood normal.         Behavior: Behavior normal.       Significant Labs: All pertinent labs within the past 24 hours have been reviewed.  Recent Lab Results         03/02/23  0435        Albumin 2.5       Alkaline Phosphatase 37       ALT 13       Anion Gap 12       AST 17       Baso # 0.03       Basophil % 0.4       BILIRUBIN TOTAL 0.5  Comment: For infants and newborns, interpretation of results should be based  on  gestational age, weight and in agreement with clinical  observations.    Premature Infant recommended reference ranges:  Up to 24 hours.............<8.0 mg/dL  Up to 48 hours............<12.0 mg/dL  3-5 days..................<15.0 mg/dL  6-29 days.................<15.0 mg/dL         BUN 30       Calcium 7.8       Chloride 102       Cholesterol 127  Comment: The National Cholesterol Education Program (NCEP) has set the  following guidelines (reference ranges) for Cholesterol:  Optimal.....................<200 mg/dL  Borderline High.............200-239 mg/dL  High........................> or = 240 mg/dL         CO2 24       Creatinine 5.3       Differential Method Automated       eGFR 8       Eos # 0.1       Eosinophil % 1.7       Glucose 158       Gran # (ANC) 4.1       Gran % 54.8       HDL 28  Comment: The National Cholesterol Education Program (NCEP) has set the  following guidelines (reference values) for HDL Cholesterol:  Low...............<40 mg/dL  Optimal...........>60 mg/dL         HDL/Cholesterol Ratio 22.0       Hematocrit 35.7       Hemoglobin 11.5       Immature Grans (Abs) 0.08  Comment: Mild elevation in immature granulocytes is non specific and   can be seen in a variety of conditions including stress response,   acute inflammation, trauma and pregnancy. Correlation with other   laboratory and clinical findings is essential.         Immature Granulocytes 1.1       LDL Cholesterol External 85.2  Comment: The National Cholesterol Education Program (NCEP) has set the  following guidelines (reference values) for LDL Cholesterol:  Optimal.......................<130 mg/dL  Borderline High...............130-159 mg/dL  High..........................160-189 mg/dL  Very High.....................>190 mg/dL         Lymph # 2.3       Lymph % 30.1       Magnesium 1.8       MCH 31.0       MCHC 32.2       MCV 96       Mono # 0.9       Mono % 11.9       MPV 11.0       Non-HDL Cholesterol 99  Comment: Risk category and  Non-HDL cholesterol goals:  Coronary heart disease (CHD)or equivalent (10-year risk of CHD >20%):  Non-HDL cholesterol goal     <130 mg/dL  Two or more CHD risk factors and 10-year risk of CHD <= 20%:  Non-HDL cholesterol goal     <160 mg/dL  0 to 1 CHD risk factor:  Non-HDL cholesterol goal     <190 mg/dL         nRBC 0       Phosphorus 3.7       Platelets 165       Potassium 4.4       PROTEIN TOTAL 6.7       RBC 3.71       RDW 14.2       Sodium 138       Total Cholesterol/HDL Ratio 4.5       Triglycerides 69  Comment: The National Cholesterol Education Program (NCEP) has set the  following guidelines (reference values) for triglycerides:  Normal......................<150 mg/dL  Borderline High.............150-199 mg/dL  High........................200-499 mg/dL         WBC 7.51               Significant Imaging: I have reviewed all pertinent imaging results/findings within the past 24 hours.      Assessment/Plan:      * Paroxysmal atrial fibrillation  Patient with Paroxysmal (<7 days) atrial fibrillation which is controlled currently with no meds. Patient is currently in sinus rhythm.[id number] Score: 3. HASBLED Score. Anticoagulation not indicated due to NSR     Metoprolol   eliquis  ECHO results pending         ESRD (end stage renal disease)  Consult nephrology  HD MWF        Hyperkalemia  No evidence on admit labs  HD per nephrology      Morbid obesity due to excess calories  Body mass index is 43.99 kg/m². Morbid obesity complicates all aspects of disease management from diagnostic modalities to treatment. Weight loss encouraged and health benefits explained to patient.           VTE Risk Mitigation (From admission, onward)         Ordered     apixaban tablet 2.5 mg  2 times daily         03/02/23 2328     IP VTE HIGH RISK PATIENT  Once         03/01/23 1808     Place sequential compression device  Until discontinued         03/01/23 1808                Discharge Planning   HAN: 3/3/2023     Code Status: Full  Code   Is the patient medically ready for discharge?:     Reason for patient still in hospital (select all that apply): Patient trending condition  Discharge Plan A: Skilled Nursing Facility                  Kelly Manzo MD  Department of Hospital Medicine   [facility]

## 2023-03-03 NOTE — ASSESSMENT & PLAN NOTE
Patient with Paroxysmal (<7 days) atrial fibrillation which is controlled currently with no meds. Patient is currently in sinus rhythm.[id number] Score: 3. HASBLED Score. Anticoagulation not indicated due to NSR     Metoprolol   eliquis  ECHO results pending

## 2023-03-03 NOTE — CONSULTS
INPATIENT NEPHROLOGY CONSULT   [facility] NEPHROLOGY    Isabella Boone  03/03/2023    Reason for consultation:    esrd    Chief Complaint:   Chief Complaint   Patient presents with    Irregular Heart Beat     Pt went into SVT while doing her dialysis treatment.  Pt tx to ed via ems.  Ems advised pt converted from SVT during transport with no interventions.            History of Present Illness:    Per H and P     Isabella Boone is a 77 y.o. female who presents to the ED via EMS with an irregular heartbeat when she was at dialysis before arriving to the ED. History obtained from an independent historian: EMS states that the patient was in the process of completing dialysis when her heart rate went to 160 bpm. The patient denies chest pain, SOB, or any other symptoms at this time. She reports making very little urine as her baseline. The patient has a PMHx of CHF, DM, and HTN. Patient lives at [facility] in [place], [place].      On workup, CXR significant for pulmonary edema. EKG was NSR. No hyperkalemia present and CBC in normal range. Nephrology was consulted with plans to do HD. The patient will be monitored on telemetry    3/2  HD stopped earlier due to AFIB rvr.   No nausea, chest pain, sob, fever, urinary or bowel complaint, new neurologic symptoms, new joint pain  3/3  hd stopped again due to AFIB rvr.  No chest pain or sob at this time      Plan of Care:       Assessment:      esrd  --reattempt hd in am  --fluid restrict  --renal dose medication per routine  --continue outpt medication  --continue binders with meals    Anemia  --erythropoiesis stimulating agent with renal replacement therapy if hg goes below 10    Hyperphosphatemia--normal today  --renal diet  --continue binders if phos goes above 5.5    Hypertension  --uf with hd  --fluid restrict  --low salt diet  --continue home medication    Edema  --fluid restrict  --uf as tolerated at hd    Afib  --cardiology consulted.  Metoprolol increased  --on  heparin           Thank you for allowing us to participate in this patient's care. We will continue to follow.    Vital Signs:  Temp Readings from Last 3 Encounters:   03/03/23 98.7 °F (37.1 °C) (Oral)   09/27/17 97 °F (36.1 °C)       Pulse Readings from Last 3 Encounters:   03/03/23 92   09/27/17 70       BP Readings from Last 3 Encounters:   03/03/23 (!) 140/65   09/27/17 (!) 116/59       Weight:  Wt Readings from Last 3 Encounters:   03/03/23 119.7 kg (264 lb)   09/22/17 123.4 kg (272 lb)       Past Medical & Surgical History:  Past Medical History:   Diagnosis Date    Arthritis     CHF (congestive heart failure)     Diabetes mellitus     Encounter for blood transfusion     Hypertension        Past Surgical History:   Procedure Laterality Date    EYE SURGERY      cataracts    HYSTERECTOMY      patial        Past Social History:  Social History     Socioeconomic History    Marital status: Single   Tobacco Use    Smoking status: Never    Smokeless tobacco: Never   Substance and Sexual Activity    Alcohol use: No    Drug use: No    Sexual activity: Never       Medications:  No current facility-administered medications on file prior to encounter.     Current Outpatient Medications on File Prior to Encounter   Medication Sig Dispense Refill    ascorbic acid, vitamin C, (VITAMIN C) 500 MG tablet Take 500 mg by mouth once daily. Give 2 tablets daily      calcium carbonate (TUMS) 200 mg calcium (500 mg) chewable tablet Take 1 tablet by mouth once daily. Take 2 tablets with meals and 3 tablets at night      gabapentin (NEURONTIN) 100 MG capsule Take 100 mg by mouth every evening.      loratadine (CLARITIN) 10 mg tablet Take 10 mg by mouth once daily.      megestroL (MEGACE) 400 mg/10 mL (40 mg/mL) Susp Take 400 mg by mouth 2 (two) times daily.      midodrine (PROAMATINE) 5 MG Tab Take 5 mg by mouth every 12 (twelve) hours.      VIT BCOMP,C/FOLIC ACID/ZINC (B COMPLEX-C-FOLIC ACID-ZN ORAL) Take by mouth.       "hydrocodone-acetaminophen 5-325mg (NORCO) 5-325 mg per tablet Take 1 tablet by mouth every 6 (six) hours as needed. 15 tablet 0    ipratropium (ATROVENT HFA) 17 mcg/actuation inhaler Inhale 1 puff into the lungs every 6 (six) hours as needed for Wheezing. Rescue      oxyCODONE (OXY-IR) 5 mg Cap Take 5 mg by mouth every 4 (four) hours as needed for Pain. Give 0.5 tablet by mouth every 4hrs as needed for pain       Scheduled Meds:   apixaban  5 mg Oral BID    [START ON 3/4/2023] metoprolol succinate  25 mg Oral Daily    mupirocin   Nasal BID    senna-docusate 8.6-50 mg  1 tablet Oral BID     Continuous Infusions:  PRN Meds:.acetaminophen, acetaminophen, dextrose 10%, dextrose 10%, dextrose, dextrose, glucagon (human recombinant), HYDROcodone-acetaminophen, HYDROcodone-acetaminophen, melatonin, naloxone, ondansetron, sodium chloride 0.9%    Allergies:  Ace inhibitors, Betadine [povidone-iodine], Dye, and Gentamicin    Past Family History:  Reviewed; refer to Hospitalist Admission Note    Review of Systems:  Review of Systems - All 14 systems reviewed and negative, except as noted in HPI    Physical Exam:    BP (!) 140/65   Pulse 92   Temp 98.7 °F (37.1 °C) (Oral)   Resp 19   Ht 5' 5" (1.651 m)   Wt 119.7 kg (264 lb)   LMP  (LMP Unknown)   SpO2 97%   Breastfeeding No   BMI 43.93 kg/m²     General Appearance:    Alert, cooperative, no distress, appears stated age   Head:    Normocephalic, without obvious abnormality, atraumatic   Eyes:    PER, conjunctiva/corneas clear, EOM's intact in both eyes        Throat:   Lips, mucosa, and tongue normal; teeth and gums normal   Back:     Symmetric, no curvature, ROM normal, no CVA tenderness   Lungs:     Clear to auscultation bilaterally, respirations unlabored   Chest wall:    No tenderness or deformity   Heart:    Regular rate and rhythm, S1 and S2 normal, no murmur, rub   or gallop   Abdomen:     Soft, non-tender, bowel sounds active all four quadrants,     no masses, " no organomegaly   Extremities:   Extremities normal, atraumatic, no cyanosis.  2 plus edema   Pulses:   2+ and symmetric all extremities   MSK:   No joint or muscle swelling, tenderness or deformity   Skin:   Skin color, texture, turgor normal, no rashes or lesions   Neurologic:   CNII-XII intact, normal strength and sensation       Throughout.  No flap     Results:  Lab Results   Component Value Date     03/03/2023    K 4.9 03/03/2023     03/03/2023    CO2 24 03/03/2023    BUN 43 (H) 03/03/2023    CREATININE 6.9 (H) 03/03/2023    CALCIUM 7.8 (L) 03/03/2023    ANIONGAP 13 03/03/2023    ESTGFRAFRICA 5 (A) 09/27/2017    EGFRNONAA 4 (A) 09/27/2017       Lab Results   Component Value Date    CALCIUM 7.8 (L) 03/03/2023    PHOS 4.9 (H) 03/03/2023       Recent Labs   Lab 03/03/23  0432   WBC 6.74   RBC 3.79*   HGB 11.7*   HCT 36.4*      MCV 96   MCH 30.9   MCHC 32.1          Imaging Results              X-Ray Chest AP Portable (Final result)  Result time 03/01/23 16:43:22      Final result by Candy Diaz MD (03/01/23 16:43:22)                   Impression:      Central pulmonary vascular congestion and probable mild pulmonary edema.      Electronically signed by: Candy Diaz  Date:    03/01/2023  Time:    16:43               Narrative:    EXAMINATION:  XR CHEST AP PORTABLE    CLINICAL HISTORY:  Tachycardia, unspecified    TECHNIQUE:  Single view of the chest was obtained.    COMPARISON:  None    FINDINGS:  Low lung volumes accentuating the cardiomediastinal contour.  Central pulmonary vascular congestion.  Atherosclerotic calcification of the aortic arch.  Perihilar and basilar predominant central opacities.  No large pleural effusion or pneumothorax.                                    Patient care was time spent personally by me on the following activities:   Obtaining a history  Examination of patient.  Providing medical care at the patients bedside.  Developing a treatment plan with patient or  surrogate and bedside caregivers  Ordering and reviewing laboratory studies, radiographic studies, pulse oximetry.  Ordering and performing treatments and interventions.  Evaluation of patient's response to treatment.  Discussions with consultants while on the unit and immediately available to the patient.  Re-evaluation of the patient's condition.  Documentation in the medical record.     Jesse Collier MD  Nephrology  [facility]  [phone]

## 2023-03-03 NOTE — CARE UPDATE
03/02/23 1941   Patient Assessment/Suction   Level of Consciousness (AVPU) alert   Respiratory Effort Unlabored   PRE-TX-O2   Device (Oxygen Therapy) room air   SpO2 (!) 94 %   Pulse Oximetry Type Intermittent   $ Pulse Oximetry - Multiple Charge Pulse Oximetry - Multiple   Pulse 96   Resp 17

## 2023-03-03 NOTE — SUBJECTIVE & OBJECTIVE
Interval History: patient seen and examined; no c/o chest pain or palpitations     Review of Systems   Constitutional: Negative.    Respiratory: Negative.     Cardiovascular:  Positive for leg swelling.   Gastrointestinal: Negative.    Genitourinary:  Positive for enuresis.   Musculoskeletal:  Positive for arthralgias, back pain and gait problem.   Neurological:  Positive for weakness and numbness.   Psychiatric/Behavioral: Negative.     Objective:     Vital Signs (Most Recent):  Temp: 98.6 °F (37 °C) (03/02/23 2324)  Pulse: 90 (03/02/23 2324)  Resp: 18 (03/02/23 2324)  BP: 105/64 (03/02/23 2324)  SpO2: (!) 93 % (03/02/23 2324)   Vital Signs (24h Range):  Temp:  [96.9 °F (36.1 °C)-98.6 °F (37 °C)] 98.6 °F (37 °C)  Pulse:  [88-98] 90  Resp:  [14-18] 18  SpO2:  [93 %-96 %] 93 %  BP: (105-134)/(58-64) 105/64     Weight: 119.9 kg (264 lb 5.3 oz)  Body mass index is 43.99 kg/m².    Intake/Output Summary (Last 24 hours) at 3/2/2023 2331  Last data filed at 3/2/2023 1100  Gross per 24 hour   Intake 240 ml   Output 450 ml   Net -210 ml      Physical Exam  Constitutional:       Appearance: Normal appearance. She is not ill-appearing.   HENT:      Head: Normocephalic and atraumatic.   Cardiovascular:      Rate and Rhythm: Normal rate and regular rhythm.      Pulses: Normal pulses.      Heart sounds: Murmur heard.   Pulmonary:      Effort: Pulmonary effort is normal.      Breath sounds: Rales present.   Abdominal:      General: Bowel sounds are normal. There is no distension.      Palpations: Abdomen is soft.      Tenderness: There is no abdominal tenderness.   Musculoskeletal:      Cervical back: Normal range of motion and neck supple.   Skin:     Capillary Refill: Capillary refill takes 2 to 3 seconds.   Neurological:      Mental Status: She is alert and oriented to person, place, and time. Mental status is at baseline.      Motor: Weakness present.      Gait: Gait abnormal.      Deep Tendon Reflexes: Reflexes abnormal.    Psychiatric:         Mood and Affect: Mood normal.         Behavior: Behavior normal.       Significant Labs: All pertinent labs within the past 24 hours have been reviewed.  Recent Lab Results         03/02/23  0435        Albumin 2.5       Alkaline Phosphatase 37       ALT 13       Anion Gap 12       AST 17       Baso # 0.03       Basophil % 0.4       BILIRUBIN TOTAL 0.5  Comment: For infants and newborns, interpretation of results should be based  on gestational age, weight and in agreement with clinical  observations.    Premature Infant recommended reference ranges:  Up to 24 hours.............<8.0 mg/dL  Up to 48 hours............<12.0 mg/dL  3-5 days..................<15.0 mg/dL  6-29 days.................<15.0 mg/dL         BUN 30       Calcium 7.8       Chloride 102       Cholesterol 127  Comment: The National Cholesterol Education Program (NCEP) has set the  following guidelines (reference ranges) for Cholesterol:  Optimal.....................<200 mg/dL  Borderline High.............200-239 mg/dL  High........................> or = 240 mg/dL         CO2 24       Creatinine 5.3       Differential Method Automated       eGFR 8       Eos # 0.1       Eosinophil % 1.7       Glucose 158       Gran # (ANC) 4.1       Gran % 54.8       HDL 28  Comment: The National Cholesterol Education Program (NCEP) has set the  following guidelines (reference values) for HDL Cholesterol:  Low...............<40 mg/dL  Optimal...........>60 mg/dL         HDL/Cholesterol Ratio 22.0       Hematocrit 35.7       Hemoglobin 11.5       Immature Grans (Abs) 0.08  Comment: Mild elevation in immature granulocytes is non specific and   can be seen in a variety of conditions including stress response,   acute inflammation, trauma and pregnancy. Correlation with other   laboratory and clinical findings is essential.         Immature Granulocytes 1.1       LDL Cholesterol External 85.2  Comment: The National Cholesterol Education Program  (NCEP) has set the  following guidelines (reference values) for LDL Cholesterol:  Optimal.......................<130 mg/dL  Borderline High...............130-159 mg/dL  High..........................160-189 mg/dL  Very High.....................>190 mg/dL         Lymph # 2.3       Lymph % 30.1       Magnesium 1.8       MCH 31.0       MCHC 32.2       MCV 96       Mono # 0.9       Mono % 11.9       MPV 11.0       Non-HDL Cholesterol 99  Comment: Risk category and Non-HDL cholesterol goals:  Coronary heart disease (CHD)or equivalent (10-year risk of CHD >20%):  Non-HDL cholesterol goal     <130 mg/dL  Two or more CHD risk factors and 10-year risk of CHD <= 20%:  Non-HDL cholesterol goal     <160 mg/dL  0 to 1 CHD risk factor:  Non-HDL cholesterol goal     <190 mg/dL         nRBC 0       Phosphorus 3.7       Platelets 165       Potassium 4.4       PROTEIN TOTAL 6.7       RBC 3.71       RDW 14.2       Sodium 138       Total Cholesterol/HDL Ratio 4.5       Triglycerides 69  Comment: The National Cholesterol Education Program (NCEP) has set the  following guidelines (reference values) for triglycerides:  Normal......................<150 mg/dL  Borderline High.............150-199 mg/dL  High........................200-499 mg/dL         WBC 7.51               Significant Imaging: I have reviewed all pertinent imaging results/findings within the past 24 hours.

## 2023-03-03 NOTE — HOSPITAL COURSE
Patient admitted from HD center with report of afib RVR during HD. On ED arrival she was NSR but CXR showed pulmonary edema. She has been attempted on HD x 2 and continues to have either SVT or atrial fibrillation. Cardiology was consulted. ECHO pending (system went down). Metoprolol dose increased and patient started on eliquis. Nephrology following and will re-attempt HD in am (3/4/23). Wound care addressing chronic wounds to sacrum/gluteal areas. Plan for dc to NH when medically stable.

## 2023-03-03 NOTE — NURSING
Pt have a run of SVT HR: 156 bpm, primary nurse give metoprolol IV 2.5 mg per MD order with relief, HR 92 bpm. Pt will be transferred to stepdown unit for close monitoring.

## 2023-03-03 NOTE — CONSULTS
On license of [facility]  Department of Cardiology  Consult Note      PATIENT NAME: Isabella Boone    MRN: [id number]  TODAY'S DATE: 03/02/2023  ADMIT DATE: 3/1/2023                          CONSULT REQUESTED BY: Kelly Manzo MD    SUBJECTIVE     PRINCIPAL PROBLEM: Paroxysmal atrial fibrillation      REASON FOR CONSULT:  New onset Afib       Admission HPI: Isabella Boone is a 77 y.o. female who presents to the ED via EMS with an irregular heartbeat when she was at dialysis before arriving to the ED. History obtained from an independent historian: EMS states that the patient was in the process of completing dialysis when her heart rate went to 160 bpm. The patient denies chest pain, SOB, or any other symptoms at this time. She reports making very little urine as her baseline. The patient has a PMHx of CHF, DM, and HTN. Patient lives at [facility] in [place], [place].      On workup, CXR significant for pulmonary edema. EKG was NSR. No hyperkalemia present and CBC in normal range. Nephrology was consulted with plans to do HD. The patient will be monitored on telemetry.      Cardiology consult:  Patient was seen and examined at bedside in the morning today.  Patient follows up with a cardiologist in [place].  Denies any history of atrial fibrillation in the past.  Denies any history of bleeding.      Review of patient's allergies indicates:   Allergen Reactions    Ace inhibitors Other (See Comments)    Betadine [povidone-iodine] Rash    Dye Other (See Comments)    Gentamicin Rash       Past Medical History:   Diagnosis Date    Arthritis     CHF (congestive heart failure)     Diabetes mellitus     Encounter for blood transfusion     Hypertension      Past Surgical History:   Procedure Laterality Date    EYE SURGERY      cataracts    HYSTERECTOMY      patial      Social History     Tobacco Use    Smoking status: Never    Smokeless tobacco: Never   Substance Use Topics    Alcohol use: No    Drug use: No        REVIEW OF  SYSTEMS  All other systems negative except as mentioned in HPI.     OBJECTIVE     VITAL SIGNS (Most Recent)  Temp: 98.2 °F (36.8 °C) (03/02/23 1917)  Pulse: 98 (03/02/23 1917)  Resp: 18 (03/02/23 1917)  BP: (!) 134/58 (03/02/23 1917)  SpO2: 96 % (03/02/23 1917)    VENTILATION STATUS  Resp: 18 (03/02/23 1917)  SpO2: 96 % (03/02/23 1917)       I & O (Last 24H):  Intake/Output Summary (Last 24 hours) at 3/2/2023 1937  Last data filed at 3/2/2023 1100  Gross per 24 hour   Intake 740 ml   Output 1196 ml   Net -456 ml       WEIGHTS  Wt Readings from Last 1 Encounters:   03/01/23 2008 119.9 kg (264 lb 5.3 oz)   03/01/23 1606 123.4 kg (272 lb)       PHYSICAL EXAM  GENERAL:  NAD  HEENT: Normocephalic. No pallor/icterus.   NECK: No JVD  CARDIAC: Regular rate and rhythm. S1 is normal.S2 is normal.  No murmurs.   LUNGS:  decreased breath sounds at bases.   ABDOMEN: Soft. Normal bowel sounds.    EXTREMITIES:  No edema/cyanosis.    CNS:  AAO x3, no focal deficits.   SKIN:  No rash.    PSYCH: normal affect    HOME MEDICATIONS:  No current facility-administered medications on file prior to encounter.     Current Outpatient Medications on File Prior to Encounter   Medication Sig Dispense Refill    ascorbic acid, vitamin C, (VITAMIN C) 500 MG tablet Take 500 mg by mouth once daily. Give 2 tablets daily      calcium carbonate (TUMS) 200 mg calcium (500 mg) chewable tablet Take 1 tablet by mouth once daily. Take 2 tablets with meals and 3 tablets at night      gabapentin (NEURONTIN) 100 MG capsule Take 100 mg by mouth every evening.      loratadine (CLARITIN) 10 mg tablet Take 10 mg by mouth once daily.      megestroL (MEGACE) 400 mg/10 mL (40 mg/mL) Susp Take 400 mg by mouth 2 (two) times daily.      midodrine (PROAMATINE) 5 MG Tab Take 5 mg by mouth every 12 (twelve) hours.      VIT BCOMP,C/FOLIC ACID/ZINC (B COMPLEX-C-FOLIC ACID-ZN ORAL) Take by mouth.      hydrocodone-acetaminophen 5-325mg (NORCO) 5-325 mg per tablet Take 1 tablet  by mouth every 6 (six) hours as needed. 15 tablet 0    ipratropium (ATROVENT HFA) 17 mcg/actuation inhaler Inhale 1 puff into the lungs every 6 (six) hours as needed for Wheezing. Rescue      oxyCODONE (OXY-IR) 5 mg Cap Take 5 mg by mouth every 4 (four) hours as needed for Pain. Give 0.5 tablet by mouth every 4hrs as needed for pain         SCHEDULED MEDS:   heparin (porcine)  5,000 Units Subcutaneous Q8H    mupirocin   Nasal BID    senna-docusate 8.6-50 mg  1 tablet Oral BID       CONTINUOUS INFUSIONS:    PRN MEDS:acetaminophen, acetaminophen, dextrose 10%, dextrose 10%, dextrose, dextrose, glucagon (human recombinant), HYDROcodone-acetaminophen, HYDROcodone-acetaminophen, melatonin, naloxone, ondansetron, sodium chloride 0.9%    LABS AND DIAGNOSTICS     CBC LAST 3 DAYS  Recent Labs   Lab 03/01/23 1642 03/02/23  0435   WBC 8.48 7.51   RBC 4.00 3.71*   HGB 12.5 11.5*   HCT 38.2 35.7*   MCV 96 96   MCH 31.3* 31.0   MCHC 32.7 32.2   RDW 14.2 14.2    165   MPV 11.1 11.0   GRAN 55.9  4.8 54.8  4.1   LYMPH 30.3  2.6 30.1  2.3   MONO 10.3  0.9 11.9  0.9   BASO 0.04 0.03   NRBC 0 0       COAGULATION LAST 3 DAYS  No results for input(s): LABPT, INR, APTT in the last 168 hours.    CHEMISTRY LAST 3 DAYS  Recent Labs   Lab 03/01/23 1642 03/02/23  0435    138   K 4.5 4.4    102   CO2 22* 24   ANIONGAP 14 12   BUN 28* 30*   CREATININE 5.1* 5.3*   * 158*   CALCIUM 8.2* 7.8*   MG  --  1.8   ALBUMIN 2.8* 2.5*   PROT 7.5 6.7   ALKPHOS 41* 37*   ALT 16 13   AST 20 17   BILITOT 0.5 0.5       CARDIAC PROFILE LAST 3 DAYS  Recent Labs   Lab 03/01/23  1642 03/01/23  1833   BNP  --  314*   TROPONINI 0.074*  --        ENDOCRINE LAST 3 DAYS  Recent Labs   Lab 03/01/23  1833   TSH 2.225       LAST ARTERIAL BLOOD GAS  ABG  No results for input(s): PH, PO2, PCO2, HCO3, BE in the last 168 hours.    LAST 7 DAYS MICROBIOLOGY   Microbiology Results (last 7 days)       ** No results found for the last 168 hours. **             MOST RECENT IMAGING  US Soft Tissue Lower Back  Narrative: EXAMINATION:  US SOFT TISSUE LOWER BACK    CLINICAL HISTORY:  Right lower back rule out abscess;    TECHNIQUE:  Grayscale and color flow Doppler imaging along the right lower back soft tissues    COMPARISON:  None    FINDINGS:  Targeted ultrasound in the area of concern reveals no mass or focal fluid collection.  Impression: On limited assessment, no acute finding in the area of concern.    Electronically signed by: Norberto Del Rio  Date:    03/02/2023  Time:    14:36      ECHOCARDIOGRAM RESULTS (last 5)  No results found for this or any previous visit.      CURRENT/PREVIOUS VISIT EKG  Results for orders placed or performed during the hospital encounter of 03/01/23   EKG 12-lead    Collection Time: 03/01/23  9:00 PM    Narrative    Test Reason : R00.0,    Vent. Rate : 097 BPM     Atrial Rate : 097 BPM     P-R Int : 178 ms          QRS Dur : 124 ms      QT Int : 396 ms       P-R-T Axes : 073 260 048 degrees     QTc Int : 502 ms    Sinus rhythm with marked sinus arrythmia  Right bundle branch block  Abnormal ECG  When compared with ECG of 01-MAR-2023 16:17,  No significant change was found    Referred By: [email or web address]   SELF           Confirmed By:        ECG reviewed by me: SR with PAC, RBBB  Telemetry:  Brief episodes of AFib    ASSESSMENT/PLAN:     Active Hospital Problems    Diagnosis    *Paroxysmal atrial fibrillation    Hyperkalemia    ESRD (end stage renal disease)    Morbid obesity due to excess calories       ASSESSMENT & PLAN:   Paroxysmal atrial fibrillation-CHADS Vasc 6-new onset  History of ESRD on HD, hypertension, DM, CHF    -in sinus rhythm,  -brief self-terminated episodes of AFib on telemetry  -discussed with patient and patient's daughter regarding the benefits and risks of anticoagulation and patient agreed to take long-term anticoagulation  -start Eliquis 5 mg b.i.d.  -can add low-dose beta-blocker Toprol XL 25 mg daily  -follow up  2D echo      Berhane Cortez MD  [facility]  Department of Cardiology  Date of Service: 03/02/2023  7:37 PM

## 2023-03-03 NOTE — ASSESSMENT & PLAN NOTE
Patient with Paroxysmal (<7 days) atrial fibrillation which is controlled currently with no meds. Patient is currently in sinus rhythm.[id number] Score: 6. HASBLED Score. Anticoagulation - eliquis     Metoprolol dose increased if BP tolerates   eliquis   ECHO results pending   Cardiology consulted

## 2023-03-03 NOTE — ASSESSMENT & PLAN NOTE
Body mass index is 43.93 kg/m². Morbid obesity complicates all aspects of disease management from diagnostic modalities to treatment. Weight loss encouraged and health benefits explained to patient.

## 2023-03-03 NOTE — ASSESSMENT & PLAN NOTE
Consult nephrology  HD MWF - home regimen  Failed attempts x 2 since admission  Re-attempt in am

## 2023-03-03 NOTE — PROGRESS NOTES
[facility]  Department of Cardiology  Progress Note    PATIENT NAME: Isabella Boone  MRN: [id number]  TODAY'S DATE: 03/03/2023  ADMIT DATE: 3/1/2023    SUBJECTIVE     PRINCIPLE PROBLEM: Paroxysmal atrial fibrillation    INTERVAL HISTORY:    3/3/2023  Ms. Boone is a 77-year-old female on chronic hemodialysis for 20 years.  She was having dialysis and went into a rapid heart rate.  Today she was having dialysis and developed an SVT rate of 154.  Her systolic pressure was in the 90s.  Some fluid was return to her increased to 110 subsequently 2.5 mg of IV metoprolol was given.  Dialysis was discontinue she converted into normal sinus rhythm.  During the episode she was describing some chest tightness and palpitations.  She denied any shortness of breath      Review of patient's allergies indicates:   Allergen Reactions    Ace inhibitors Other (See Comments)    Betadine [povidone-iodine] Rash    Dye Other (See Comments)    Gentamicin Rash       REVIEW OF SYSTEMS  CARDIOVASCULAR:  Palpitations and chest tightness  RESPIRATORY: No recent fever, cough chills, SOB or congestion  : No blood in the urine  GI: No Nausea, vomiting, constipation, diarrhea, blood, or reflux.  MUSCULOSKELETAL: No myalgias  NEURO: No lightheadedness or dizziness  EYES: No Double vision, blurry, vision or headache     OBJECTIVE     VITAL SIGNS (Most Recent)  Temp: 97.1 °F (36.2 °C) (03/03/23 0801)  Pulse: (!) (P) 156 (03/03/23 0925)  Resp: (P) 16 (03/03/23 0925)  BP: (!) 141/71 (03/03/23 0930)  SpO2: (!) 94 % (03/03/23 0801)    VENTILATION STATUS  Resp: (P) 16 (03/03/23 0925)  SpO2: (!) 94 % (03/03/23 0801)       I & O (Last 24H):  Intake/Output Summary (Last 24 hours) at 3/3/2023 1015  Last data filed at 3/3/2023 0613  Gross per 24 hour   Intake 240 ml   Output 450 ml   Net -210 ml       WEIGHTS  Wt Readings from Last 1 Encounters:   03/01/23 2008 119.9 kg (264 lb 5.3 oz)   03/01/23 1606 123.4 kg (272 lb)       PHYSICAL  EXAM  CONSTITUTIONAL: Well built, well nourished in no apparent distress  NECK: no carotid bruit, no JVD  LUNGS: CTA  CHEST WALL: no tenderness  HEART: regular rate and rhythm, S1, S2 normal, no murmur, click, rub or gallop   ABDOMEN: soft, non-tender; bowel sounds normal; no masses,  no organomegaly  EXTREMITIES: Extremities normal, no edema  NEURO: AAO X 3    SCHEDULED MEDS:   apixaban  5 mg Oral BID    metoprolol succinate  12.5 mg Oral Daily    mupirocin   Nasal BID    senna-docusate 8.6-50 mg  1 tablet Oral BID       CONTINUOUS INFUSIONS:    PRN MEDS:acetaminophen, acetaminophen, dextrose 10%, dextrose 10%, dextrose, dextrose, glucagon (human recombinant), HYDROcodone-acetaminophen, HYDROcodone-acetaminophen, melatonin, naloxone, ondansetron, sodium chloride 0.9%    LABS AND DIAGNOSTICS     CBC LAST 3 DAYS  Recent Labs   Lab 03/01/23  1642 03/02/23 0435 03/03/23 0432   WBC 8.48 7.51 6.74   RBC 4.00 3.71* 3.79*   HGB 12.5 11.5* 11.7*   HCT 38.2 35.7* 36.4*   MCV 96 96 96   MCH 31.3* 31.0 30.9   MCHC 32.7 32.2 32.1   RDW 14.2 14.2 14.0    165 158   MPV 11.1 11.0 10.9   GRAN 55.9  4.8 54.8  4.1 48.6  3.3   LYMPH 30.3  2.6 30.1  2.3 33.8  2.3   MONO 10.3  0.9 11.9  0.9 13.9  0.9   BASO 0.04 0.03 0.02   NRBC 0 0 0       COAGULATION LAST 3 DAYS  No results for input(s): LABPT, INR, APTT in the last 168 hours.    CHEMISTRY LAST 3 DAYS  Recent Labs   Lab 03/01/23  1642 03/02/23 0435 03/03/23 0432    138 141   K 4.5 4.4 4.9    102 104   CO2 22* 24 24   ANIONGAP 14 12 13   BUN 28* 30* 43*   CREATININE 5.1* 5.3* 6.9*   * 158* 158*   CALCIUM 8.2* 7.8* 7.8*   MG  --  1.8 1.9   ALBUMIN 2.8* 2.5* 2.6*   PROT 7.5 6.7 6.8   ALKPHOS 41* 37* 35*   ALT 16 13 11   AST 20 17 18   BILITOT 0.5 0.5 0.5       CARDIAC PROFILE LAST 3 DAYS  Recent Labs   Lab 03/01/23  1642 03/01/23  1833   BNP  --  314*   TROPONINI 0.074*  --        ENDOCRINE LAST 3 DAYS  Recent Labs   Lab 03/01/23  1833   TSH 2.225        LAST ARTERIAL BLOOD GAS  ABG  No results for input(s): PH, PO2, PCO2, HCO3, BE in the last 168 hours.    LAST 7 DAYS MICROBIOLOGY   Microbiology Results (last 7 days)       ** No results found for the last 168 hours. **            MOST RECENT IMAGING  US Soft Tissue Lower Back  Narrative: EXAMINATION:  US SOFT TISSUE LOWER BACK    CLINICAL HISTORY:  Right lower back rule out abscess;    TECHNIQUE:  Grayscale and color flow Doppler imaging along the right lower back soft tissues    COMPARISON:  None    FINDINGS:  Targeted ultrasound in the area of concern reveals no mass or focal fluid collection.  Impression: On limited assessment, no acute finding in the area of concern.    Electronically signed by: Norberto Del Rio  Date:    03/02/2023  Time:    14:36      LASTECHO  No results found for this or any previous visit.      CURRENT/PREVIOUS VISIT EKG  Results for orders placed or performed during the hospital encounter of 03/01/23   EKG 12-lead    Collection Time: 03/01/23  9:00 PM    Narrative    Test Reason : R00.0,    Vent. Rate : 097 BPM     Atrial Rate : 097 BPM     P-R Int : 178 ms          QRS Dur : 124 ms      QT Int : 396 ms       P-R-T Axes : 073 260 048 degrees     QTc Int : 502 ms    Sinus rhythm with marked sinus arrythmia  Right bundle branch block  Abnormal ECG  When compared with ECG of 01-MAR-2023 16:17,  No significant change was found    Referred By: RYAN   SELF           Confirmed By:        ASSESSMENT/PLAN:     Active Hospital Problems    Diagnosis    *Paroxysmal atrial fibrillation    Hyperkalemia    ESRD (end stage renal disease)    Morbid obesity due to excess calories       ASSESSMENT & PLAN:   PSVT that occurs during dialysis.  She responded well to low doses of metoprolol.  Will increase the oral metoprolol.  Will contact EP for suggestions.    End-stage renal disease on hemodialysis    Morbid obesity      RECOMMENDATIONS:  Higher doses of metoprolol.  Contact Dr. Talbot with EP  [facility] for his recommendations        Grey Toscano MD  Department of Cardiology  Date of Service: 03/03/2023  10:15 AM

## 2023-03-03 NOTE — PROGRESS NOTES
Pt was dialyzed a total of 75 mins, 1 hr into the tx pt went into a rapid afib, notified, order given to rinse back pt. 178ml net u/f. After pt rinsed back, heart rhythem returned to NSR

## 2023-03-03 NOTE — NURSING
Received call from monitor room patient in 's. B/P 104/50, , 100ml bolus given by dialysis nurse. Dr Tinoco at bedside, Metoprolol 2.5.IVP ordered.

## 2023-03-03 NOTE — EICU
Intervention Initiated From:  [facility] / [facility]    Yaritza intervened regarding:  Rounding (Video assessment)    Comments: Video assessment complete. Pt resting in bed, no acute distress noted. VS per flowsheet.

## 2023-03-03 NOTE — ASSESSMENT & PLAN NOTE
HD for volume control, though have not been able to successfully pull much volume off due to unstable hemodynamics  Patient no longer urinates so diuretics not helpful  Fluid restriction added to renal diet  RE-attempt HD in am

## 2023-03-04 LAB
ALBUMIN SERPL BCP-MCNC: 2.6 G/DL (ref 3.5–5.2)
ALP SERPL-CCNC: 35 U/L (ref 55–135)
ALT SERPL W/O P-5'-P-CCNC: 13 U/L (ref 10–44)
ANION GAP SERPL CALC-SCNC: 14 MMOL/L (ref 8–16)
AORTIC ROOT ANNULUS: 2.61 CM
AORTIC VALVE CUSP SEPERATION: 1.96 CM
AST SERPL-CCNC: 19 U/L (ref 10–40)
AV INDEX (PROSTH): 1.12
AV MEAN GRADIENT: 6 MMHG
AV PEAK GRADIENT: 9 MMHG
AV VALVE AREA: 3.45 CM2
AV VELOCITY RATIO: 1.05
BASOPHILS # BLD AUTO: 0.02 K/UL (ref 0–0.2)
BASOPHILS NFR BLD: 0.3 % (ref 0–1.9)
BILIRUB SERPL-MCNC: 0.5 MG/DL (ref 0.1–1)
BSA FOR ECHO PROCEDURE: 2.34 M2
BUN SERPL-MCNC: 37 MG/DL (ref 8–23)
CALCIUM SERPL-MCNC: 8 MG/DL (ref 8.7–10.5)
CHLORIDE SERPL-SCNC: 105 MMOL/L (ref 95–110)
CO2 SERPL-SCNC: 24 MMOL/L (ref 23–29)
CREAT SERPL-MCNC: 7 MG/DL (ref 0.5–1.4)
CV ECHO LV RWT: 0.48 CM
DIFFERENTIAL METHOD: ABNORMAL
DOP CALC AO PEAK VEL: 1.47 M/S
DOP CALC AO VTI: 29 CM
DOP CALC LVOT AREA: 3.1 CM2
DOP CALC LVOT DIAMETER: 1.98 CM
DOP CALC LVOT PEAK VEL: 1.54 M/S
DOP CALC LVOT STROKE VOLUME: 100.02 CM3
DOP CALC MV VTI: 43.3 CM
DOP CALCLVOT PEAK VEL VTI: 32.5 CM
E WAVE DECELERATION TIME: 482.61 MSEC
E/A RATIO: 0.83
E/E' RATIO: 26.17 M/S
ECHO LV POSTERIOR WALL: 0.83 CM (ref 0.6–1.1)
EJECTION FRACTION: 60 %
EOSINOPHIL # BLD AUTO: 0.2 K/UL (ref 0–0.5)
EOSINOPHIL NFR BLD: 2.6 % (ref 0–8)
ERYTHROCYTE [DISTWIDTH] IN BLOOD BY AUTOMATED COUNT: 14 % (ref 11.5–14.5)
EST. GFR  (NO RACE VARIABLE): 6 ML/MIN/1.73 M^2
FRACTIONAL SHORTENING: 29 % (ref 28–44)
GLUCOSE SERPL-MCNC: 127 MG/DL (ref 70–110)
HCT VFR BLD AUTO: 35.4 % (ref 37–48.5)
HGB BLD-MCNC: 11.3 G/DL (ref 12–16)
IMM GRANULOCYTES # BLD AUTO: 0.04 K/UL (ref 0–0.04)
IMM GRANULOCYTES NFR BLD AUTO: 0.6 % (ref 0–0.5)
INTERVENTRICULAR SEPTUM: 0.81 CM (ref 0.6–1.1)
IVRT: 95.15 MSEC
LA MAJOR: 5.17 CM
LA MINOR: 5.48 CM
LEFT ATRIUM VOLUME INDEX MOD: 22.6 ML/M2
LEFT ATRIUM VOLUME MOD: 50.37 CM3
LEFT INTERNAL DIMENSION IN SYSTOLE: 2.44 CM (ref 2.1–4)
LEFT VENTRICLE DIASTOLIC VOLUME INDEX: 22.18 ML/M2
LEFT VENTRICLE DIASTOLIC VOLUME: 49.47 ML
LEFT VENTRICLE MASS INDEX: 34 G/M2
LEFT VENTRICLE SYSTOLIC VOLUME INDEX: 11 ML/M2
LEFT VENTRICLE SYSTOLIC VOLUME: 24.44 ML
LEFT VENTRICULAR INTERNAL DIMENSION IN DIASTOLE: 3.46 CM (ref 3.5–6)
LEFT VENTRICULAR MASS: 76.5 G
LV LATERAL E/E' RATIO: 22.43 M/S
LV SEPTAL E/E' RATIO: 31.4 M/S
LVOT MG: 7.38 MMHG
LVOT MV: 1.32 CM/S
LYMPHOCYTES # BLD AUTO: 2.5 K/UL (ref 1–4.8)
LYMPHOCYTES NFR BLD: 38.2 % (ref 18–48)
MAGNESIUM SERPL-MCNC: 1.9 MG/DL (ref 1.6–2.6)
MCH RBC QN AUTO: 30.9 PG (ref 27–31)
MCHC RBC AUTO-ENTMCNC: 31.9 G/DL (ref 32–36)
MCV RBC AUTO: 97 FL (ref 82–98)
MONOCYTES # BLD AUTO: 0.8 K/UL (ref 0.3–1)
MONOCYTES NFR BLD: 12.3 % (ref 4–15)
MV MEAN GRADIENT: 9 MMHG
MV PEAK A VEL: 1.89 M/S
MV PEAK E VEL: 1.57 M/S
MV PEAK GRADIENT: 20 MMHG
MV STENOSIS PRESSURE HALF TIME: 106.95 MS
MV VALVE AREA BY CONTINUITY EQUATION: 2.31 CM2
MV VALVE AREA P 1/2 METHOD: 2.06 CM2
NEUTROPHILS # BLD AUTO: 3 K/UL (ref 1.8–7.7)
NEUTROPHILS NFR BLD: 46 % (ref 38–73)
NRBC BLD-RTO: 0 /100 WBC
PHOSPHATE SERPL-MCNC: 5 MG/DL (ref 2.7–4.5)
PISA MRMAX VEL: 5.07 M/S
PISA TR MAX VEL: 3.43 M/S
PLATELET # BLD AUTO: 151 K/UL (ref 150–450)
PMV BLD AUTO: 10.7 FL (ref 9.2–12.9)
POTASSIUM SERPL-SCNC: 5 MMOL/L (ref 3.5–5.1)
PROT SERPL-MCNC: 6.9 G/DL (ref 6–8.4)
PV MV: 0.87 M/S
PV PEAK VELOCITY: 1.12 CM/S
RA MAJOR: 5.39 CM
RA PRESSURE: 3 MMHG
RBC # BLD AUTO: 3.66 M/UL (ref 4–5.4)
RIGHT VENTRICULAR END-DIASTOLIC DIMENSION: 3.42 CM
RIGHT VENTRICULAR LENGTH IN DIASTOLE (APICAL 4-CHAMBER VIEW): 51.9 CM
RV TISSUE DOPPLER FREE WALL SYSTOLIC VELOCITY 1 (APICAL 4 CHAMBER VIEW): 0.01 CM/S
SODIUM SERPL-SCNC: 143 MMOL/L (ref 136–145)
TDI LATERAL: 0.07 M/S
TDI SEPTAL: 0.05 M/S
TDI: 0.06 M/S
TR MAX PG: 47 MMHG
TRICUSPID ANNULAR PLANE SYSTOLIC EXCURSION: 2.46 CM
TV REST PULMONARY ARTERY PRESSURE: 50 MMHG
WBC # BLD AUTO: 6.5 K/UL (ref 3.9–12.7)

## 2023-03-04 PROCEDURE — 99233 PR SUBSEQUENT HOSPITAL CARE,LEVL III: ICD-10-PCS | Mod: ,,, | Performed by: INTERNAL MEDICINE

## 2023-03-04 PROCEDURE — 84100 ASSAY OF PHOSPHORUS: CPT | Performed by: HOSPITALIST

## 2023-03-04 PROCEDURE — 99233 SBSQ HOSP IP/OBS HIGH 50: CPT | Mod: ,,, | Performed by: INTERNAL MEDICINE

## 2023-03-04 PROCEDURE — 25000003 PHARM REV CODE 250: Performed by: INTERNAL MEDICINE

## 2023-03-04 PROCEDURE — 90935 HEMODIALYSIS ONE EVALUATION: CPT

## 2023-03-04 PROCEDURE — 85025 COMPLETE CBC W/AUTO DIFF WBC: CPT | Performed by: HOSPITALIST

## 2023-03-04 PROCEDURE — 80053 COMPREHEN METABOLIC PANEL: CPT | Performed by: HOSPITALIST

## 2023-03-04 PROCEDURE — 83735 ASSAY OF MAGNESIUM: CPT | Performed by: HOSPITALIST

## 2023-03-04 PROCEDURE — 36415 COLL VENOUS BLD VENIPUNCTURE: CPT | Performed by: HOSPITALIST

## 2023-03-04 PROCEDURE — 94761 N-INVAS EAR/PLS OXIMETRY MLT: CPT

## 2023-03-04 PROCEDURE — 25000003 PHARM REV CODE 250: Performed by: HOSPITALIST

## 2023-03-04 PROCEDURE — 20600001 HC STEP DOWN PRIVATE ROOM

## 2023-03-04 RX ORDER — METOPROLOL TARTRATE 1 MG/ML
2.5 INJECTION, SOLUTION INTRAVENOUS ONCE
Status: COMPLETED | OUTPATIENT
Start: 2023-03-04 | End: 2023-03-04

## 2023-03-04 RX ORDER — METOPROLOL SUCCINATE 50 MG/1
50 TABLET, EXTENDED RELEASE ORAL DAILY
Status: DISCONTINUED | OUTPATIENT
Start: 2023-03-04 | End: 2023-03-06 | Stop reason: HOSPADM

## 2023-03-04 RX ORDER — NAPROXEN SODIUM 220 MG/1
81 TABLET, FILM COATED ORAL DAILY
Status: DISCONTINUED | OUTPATIENT
Start: 2023-03-04 | End: 2023-03-04

## 2023-03-04 RX ORDER — METOPROLOL TARTRATE 1 MG/ML
INJECTION, SOLUTION INTRAVENOUS
Status: DISPENSED
Start: 2023-03-04 | End: 2023-03-05

## 2023-03-04 RX ADMIN — APIXABAN 2.5 MG: 2.5 TABLET, FILM COATED ORAL at 10:03

## 2023-03-04 RX ADMIN — METOPROLOL SUCCINATE 50 MG: 50 TABLET, EXTENDED RELEASE ORAL at 10:03

## 2023-03-04 RX ADMIN — APIXABAN 2.5 MG: 2.5 TABLET, FILM COATED ORAL at 08:03

## 2023-03-04 RX ADMIN — METOROPROLOL TARTRATE 2.5 MG: 5 INJECTION, SOLUTION INTRAVENOUS at 02:03

## 2023-03-04 RX ADMIN — MUPIROCIN: 20 OINTMENT TOPICAL at 10:03

## 2023-03-04 NOTE — ASSESSMENT & PLAN NOTE
Consult nephrology  HD MWF - home regimen  Failed attempts x 2 since admission  Scheduled for HD today.

## 2023-03-04 NOTE — SUBJECTIVE & OBJECTIVE
Interval History: Patient seen and examined; Receiving HD, rate controlled. Denies any SOB or CP.     Review of Systems   Constitutional: Negative.    Respiratory: Negative.     Cardiovascular:  Positive for leg swelling.   Gastrointestinal: Negative.    Genitourinary:  Positive for enuresis.   Musculoskeletal:  Positive for arthralgias, back pain and gait problem.   Neurological:  Positive for weakness and numbness.   Psychiatric/Behavioral: Negative.     Objective:     Vital Signs (Most Recent):  Temp: 97.2 °F (36.2 °C) (03/04/23 0722)  Pulse: 80 (03/04/23 0800)  Resp: 17 (03/04/23 0800)  BP: (!) 158/69 (03/04/23 0800)  SpO2: 97 % (03/04/23 0800)   Vital Signs (24h Range):  Temp:  [97.2 °F (36.2 °C)-98.7 °F (37.1 °C)] 97.2 °F (36.2 °C)  Pulse:  [80-97] 80  Resp:  [11-20] 17  SpO2:  [92 %-97 %] 97 %  BP: (125-158)/(58-69) 158/69     Weight: 119.7 kg (264 lb)  Body mass index is 43.93 kg/m².    Intake/Output Summary (Last 24 hours) at 3/4/2023 1120  Last data filed at 3/4/2023 0606  Gross per 24 hour   Intake 240 ml   Output --   Net 240 ml        Physical Exam  Constitutional:       Appearance: Normal appearance. She is not ill-appearing.   HENT:      Head: Normocephalic and atraumatic.   Cardiovascular:      Rate and Rhythm: Normal rate and regular rhythm.      Pulses: Normal pulses.      Heart sounds: Murmur heard.   Pulmonary:      Effort: Pulmonary effort is normal.      Breath sounds: Rales present.   Abdominal:      General: Bowel sounds are normal. There is no distension.      Palpations: Abdomen is soft.      Tenderness: There is no abdominal tenderness.   Musculoskeletal:      Cervical back: Normal range of motion and neck supple.   Skin:     Capillary Refill: Capillary refill takes 2 to 3 seconds.   Neurological:      Mental Status: She is alert and oriented to person, place, and time. Mental status is at baseline.      Motor: Weakness present.      Gait: Gait abnormal.      Deep Tendon Reflexes: Reflexes  abnormal.   Psychiatric:         Mood and Affect: Mood normal.         Behavior: Behavior normal.       Significant Labs:   Lab Results   Component Value Date    WBC 6.50 03/04/2023    HGB 11.3 (L) 03/04/2023    HCT 35.4 (L) 03/04/2023    MCV 97 03/04/2023     03/04/2023     CMP  Sodium   Date Value Ref Range Status   03/04/2023 143 136 - 145 mmol/L Final     Potassium   Date Value Ref Range Status   03/04/2023 5.0 3.5 - 5.1 mmol/L Final     Chloride   Date Value Ref Range Status   03/04/2023 105 95 - 110 mmol/L Final     CO2   Date Value Ref Range Status   03/04/2023 24 23 - 29 mmol/L Final     Glucose   Date Value Ref Range Status   03/04/2023 127 (H) 70 - 110 mg/dL Final     BUN   Date Value Ref Range Status   03/04/2023 37 (H) 8 - 23 mg/dL Final     Creatinine   Date Value Ref Range Status   03/04/2023 7.0 (H) 0.5 - 1.4 mg/dL Final     Calcium   Date Value Ref Range Status   03/04/2023 8.0 (L) 8.7 - 10.5 mg/dL Final     Total Protein   Date Value Ref Range Status   03/04/2023 6.9 6.0 - 8.4 g/dL Final     Albumin   Date Value Ref Range Status   03/04/2023 2.6 (L) 3.5 - 5.2 g/dL Final     Total Bilirubin   Date Value Ref Range Status   03/04/2023 0.5 0.1 - 1.0 mg/dL Final     Comment:     For infants and newborns, interpretation of results should be based  on gestational age, weight and in agreement with clinical  observations.    Premature Infant recommended reference ranges:  Up to 24 hours.............<8.0 mg/dL  Up to 48 hours............<12.0 mg/dL  3-5 days..................<15.0 mg/dL  6-29 days.................<15.0 mg/dL       Alkaline Phosphatase   Date Value Ref Range Status   03/04/2023 35 (L) 55 - 135 U/L Final     AST   Date Value Ref Range Status   03/04/2023 19 10 - 40 U/L Final     ALT   Date Value Ref Range Status   03/04/2023 13 10 - 44 U/L Final     Anion Gap   Date Value Ref Range Status   03/04/2023 14 8 - 16 mmol/L Final     eGFR   Date Value Ref Range Status   03/04/2023 6 (A) >60  mL/min/1.73 m^2 Final     Microbiology Results (last 7 days)       ** No results found for the last 168 hours. **          Significant Imaging:   ECHO:  The left ventricle is normal in size with concentric remodeling and normal systolic function.  The estimated ejection fraction is 60%.  Grade II left ventricular diastolic dysfunction.  Normal right ventricular size with normal right ventricular systolic function.  Mild left atrial enlargement.  Mild right atrial enlargement.  Mild mitral regurgitation.  The mean diastolic gradient across the mitral valve is 9 mmHg at a heart rate of bpm.  There is mild to moderate mitral stenosis.  Moderate tricuspid regurgitation.  There is pulmonary hypertension.  Normal central venous pressure (3 mmHg).  The estimated PA systolic pressure is 50 mmHg.    US lower back soft-tissue:   On limited assessment, no acute finding in the area of concern.     CXR: Central pulmonary vascular congestion and probable mild pulmonary edema.

## 2023-03-04 NOTE — NURSING
Spoke with dr an during bedside rounds who notified me that he will ajusting pt's po metoprolol dose and other meds. He ordered to give medications now. He states if pt goes into SVT again during scheduled dialysis today to give PRN metoprolol iv 2.5mg etc.

## 2023-03-04 NOTE — EICU
Intervention Initiated From:  [facility] / EICU    Yaritza intervened regarding:  Rounding (Video assessment)    Comments: pt awake and resting in bed. Pt without complaint and or request. VSS and NAD observed. No drips infusing at present time.

## 2023-03-04 NOTE — RESPIRATORY THERAPY
03/1945   Patient Assessment/Suction   Level of Consciousness (AVPU) alert   Respiratory Effort Normal;Unlabored   Expansion/Accessory Muscles/Retractions expansion symmetric;no use of accessory muscles   PRE-TX-O2   Device (Oxygen Therapy) room air   SpO2 (!) 94 %   Pulse Oximetry Type Continuous   $ Pulse Oximetry - Multiple Charge Pulse Oximetry - Multiple   Pulse 88   Resp 19

## 2023-03-04 NOTE — PLAN OF CARE
Pt appears to be resting, eyes are closed, breaths are deep and even. VSS, NAD noted at this time. Discussed PoC with pt, and daughter who I spoke w/on the phone, stated they understood and would help as able. No c/o pain, will continue to monitor.

## 2023-03-04 NOTE — PROGRESS NOTES
[facility] Hospital Medicine  Progress Note    Patient Name: Isabella Boone  MRN: [id number]  Patient Class: IP- Inpatient   Admission Date: 3/1/2023  Length of Stay: 1 days  Attending Physician: Joce Barney MD  Primary Care Provider: Tin Yousif MD        Subjective:     Principal Problem:Paroxysmal atrial fibrillation        HPI:  Isabella Boone is a 77 y.o. female who presents to the ED via EMS with an irregular heartbeat when she was at dialysis before arriving to the ED. History obtained from an independent historian: EMS states that the patient was in the process of completing dialysis when her heart rate went to 160 bpm. The patient denies chest pain, SOB, or any other symptoms at this time. She reports making very little urine as her baseline. The patient has a PMHx of CHF, DM, and HTN. Patient lives at [facility] in [place], [place].     On workup, CXR significant for pulmonary edema. EKG was NSR. No hyperkalemia present and CBC in normal range. Nephrology was consulted with plans to do HD. The patient will be monitored on telemetry.      Overview/Hospital Course:  Patient admitted from HD center with report of afib RVR during HD. On ED arrival she was NSR but CXR showed pulmonary edema. She has been attempted on HD x 2 and continues to have either SVT or atrial fibrillation. Cardiology was consulted. ECHO pending (system went down). Metoprolol dose increased and patient started on eliquis. Nephrology following and will re-attempt HD in am (3/4/23). Wound care addressing chronic wounds to sacrum/gluteal areas. Plan for dc to NH when medically stable.       Interval History: Patient seen and examined; Receiving HD, rate controlled. Denies any SOB or CP.     Review of Systems   Constitutional: Negative.    Respiratory: Negative.     Cardiovascular:  Positive for leg swelling.   Gastrointestinal: Negative.    Genitourinary:  Positive for enuresis.   Musculoskeletal:  Positive for  arthralgias, back pain and gait problem.   Neurological:  Positive for weakness and numbness.   Psychiatric/Behavioral: Negative.     Objective:     Vital Signs (Most Recent):  Temp: 97.2 °F (36.2 °C) (03/04/23 0722)  Pulse: 80 (03/04/23 0800)  Resp: 17 (03/04/23 0800)  BP: (!) 158/69 (03/04/23 0800)  SpO2: 97 % (03/04/23 0800)   Vital Signs (24h Range):  Temp:  [97.2 °F (36.2 °C)-98.7 °F (37.1 °C)] 97.2 °F (36.2 °C)  Pulse:  [80-97] 80  Resp:  [11-20] 17  SpO2:  [92 %-97 %] 97 %  BP: (125-158)/(58-69) 158/69     Weight: 119.7 kg (264 lb)  Body mass index is 43.93 kg/m².    Intake/Output Summary (Last 24 hours) at 3/4/2023 1120  Last data filed at 3/4/2023 0606  Gross per 24 hour   Intake 240 ml   Output --   Net 240 ml        Physical Exam  Constitutional:       Appearance: Normal appearance. She is not ill-appearing.   HENT:      Head: Normocephalic and atraumatic.   Cardiovascular:      Rate and Rhythm: Normal rate and regular rhythm.      Pulses: Normal pulses.      Heart sounds: Murmur heard.   Pulmonary:      Effort: Pulmonary effort is normal.      Breath sounds: Rales present.   Abdominal:      General: Bowel sounds are normal. There is no distension.      Palpations: Abdomen is soft.      Tenderness: There is no abdominal tenderness.   Musculoskeletal:      Cervical back: Normal range of motion and neck supple.   Skin:     Capillary Refill: Capillary refill takes 2 to 3 seconds.   Neurological:      Mental Status: She is alert and oriented to person, place, and time. Mental status is at baseline.      Motor: Weakness present.      Gait: Gait abnormal.      Deep Tendon Reflexes: Reflexes abnormal.   Psychiatric:         Mood and Affect: Mood normal.         Behavior: Behavior normal.       Significant Labs:   Lab Results   Component Value Date    WBC 6.50 03/04/2023    HGB 11.3 (L) 03/04/2023    HCT 35.4 (L) 03/04/2023    MCV 97 03/04/2023     03/04/2023     CMP  Sodium   Date Value Ref Range Status    03/04/2023 143 136 - 145 mmol/L Final     Potassium   Date Value Ref Range Status   03/04/2023 5.0 3.5 - 5.1 mmol/L Final     Chloride   Date Value Ref Range Status   03/04/2023 105 95 - 110 mmol/L Final     CO2   Date Value Ref Range Status   03/04/2023 24 23 - 29 mmol/L Final     Glucose   Date Value Ref Range Status   03/04/2023 127 (H) 70 - 110 mg/dL Final     BUN   Date Value Ref Range Status   03/04/2023 37 (H) 8 - 23 mg/dL Final     Creatinine   Date Value Ref Range Status   03/04/2023 7.0 (H) 0.5 - 1.4 mg/dL Final     Calcium   Date Value Ref Range Status   03/04/2023 8.0 (L) 8.7 - 10.5 mg/dL Final     Total Protein   Date Value Ref Range Status   03/04/2023 6.9 6.0 - 8.4 g/dL Final     Albumin   Date Value Ref Range Status   03/04/2023 2.6 (L) 3.5 - 5.2 g/dL Final     Total Bilirubin   Date Value Ref Range Status   03/04/2023 0.5 0.1 - 1.0 mg/dL Final     Comment:     For infants and newborns, interpretation of results should be based  on gestational age, weight and in agreement with clinical  observations.    Premature Infant recommended reference ranges:  Up to 24 hours.............<8.0 mg/dL  Up to 48 hours............<12.0 mg/dL  3-5 days..................<15.0 mg/dL  6-29 days.................<15.0 mg/dL       Alkaline Phosphatase   Date Value Ref Range Status   03/04/2023 35 (L) 55 - 135 U/L Final     AST   Date Value Ref Range Status   03/04/2023 19 10 - 40 U/L Final     ALT   Date Value Ref Range Status   03/04/2023 13 10 - 44 U/L Final     Anion Gap   Date Value Ref Range Status   03/04/2023 14 8 - 16 mmol/L Final     eGFR   Date Value Ref Range Status   03/04/2023 6 (A) >60 mL/min/1.73 m^2 Final     Microbiology Results (last 7 days)       ** No results found for the last 168 hours. **          Significant Imaging:   ECHO:   The left ventricle is normal in size with concentric remodeling and normal systolic function.   The estimated ejection fraction is 60%.   Grade II left ventricular  diastolic dysfunction.   Normal right ventricular size with normal right ventricular systolic function.   Mild left atrial enlargement.   Mild right atrial enlargement.   Mild mitral regurgitation.   The mean diastolic gradient across the mitral valve is 9 mmHg at a heart rate of bpm.   There is mild to moderate mitral stenosis.   Moderate tricuspid regurgitation.   There is pulmonary hypertension.   Normal central venous pressure (3 mmHg).   The estimated PA systolic pressure is 50 mmHg.    US lower back soft-tissue:   On limited assessment, no acute finding in the area of concern.     CXR: Central pulmonary vascular congestion and probable mild pulmonary edema.         Assessment/Plan:      * Paroxysmal atrial fibrillation  Patient with Paroxysmal (<7 days) atrial fibrillation which is controlled currently with no meds. Patient is currently in sinus rhythm.[id number] Score: 6. HASBLED Score. Anticoagulation - eliquis     Metoprolol dose increased if BP tolerates   eliquis   ECHO results reviewed.   Follow cardiology recommendations.         Chronic pulmonary edema  HD for volume control, though have not been able to successfully pull much volume off due to unstable hemodynamics  Patient no longer urinates so diuretics not helpful  Fluid restriction added to renal diet  Plan to remove 2-3L via HD today.       Hyperkalemia  Follow BMP, follow nephrology recommendations.       Morbid obesity due to excess calories  Body mass index is 43.93 kg/m². Morbid obesity complicates all aspects of disease management from diagnostic modalities to treatment. Weight loss encouraged and health benefits explained to patient.         ESRD (end stage renal disease)  Consult nephrology  HD MWF - home regimen  Failed attempts x 2 since admission  Scheduled for HD today.           VTE Risk Mitigation (From admission, onward)         Ordered     apixaban tablet 2.5 mg  2 times daily         03/04/23 0946     IP VTE HIGH RISK  PATIENT  Once         03/01/23 1808     Place sequential compression device  Until discontinued         03/01/23 1808                Discharge Planning   HAN: 3/5/2023     Code Status: Full Code   Is the patient medically ready for discharge?:     Reason for patient still in hospital (select all that apply): Patient trending condition and Consult recommendations  Discharge Plan A: Return to nursing home                  Joce Barney MD  Department of Hospital Medicine   [facility]

## 2023-03-04 NOTE — NURSING
Notified by charge nurse that pt is currently recieving dialysis at bedside and SVT -145bpm pt asymptomatic /60. Charge nurse contacted dr aguirre and notified him that no PRN IV Metoprolol ordered and that rec's from cardiology was 2.5mg IV. New orders received by  for IV metoprolol 2.5mg and a 250ml bolus. IV metoprolol given with no response in Hr. Per Tele remained SVT 140bpm, /60 & asymptomatic.  Dialysis nurse at bedside contacted Dr Frey who ordered to start wash back of dialysis. Immediately upon dialysis nurse doing so pt HR converted in to NSR HR 70's, BP WNL. Dr Frey contacted me ordering to hold bolus of fluids ordered due to pt HR immediately responding to washback and to notify dr aguirre that fluids to be held since corrected. Contacted dr aguirre and notified of NSR HR 70's and holding bolus. No issues noted. Pt denies complaints. Continue to monitor.

## 2023-03-04 NOTE — PROGRESS NOTES
[facility]  Department of Cardiology  Progress Note    PATIENT NAME: Isabella Boone  MRN: [id number]  TODAY'S DATE: 03/04/2023  ADMIT DATE: 3/1/2023    SUBJECTIVE     PRINCIPLE PROBLEM: Paroxysmal atrial fibrillation    INTERVAL HISTORY:    3/4/2023  She is feeling okay she denies any chest pains any shortness of breath.  She is set up for dialysis today.  Will increase the doses of metoprolol.  The echocardiogram revealed diastolic dysfunction with mild to moderate degree of mitral stenosis.  The study is a technically difficult study.    3/3/2023  Ms. Boone is a 77-year-old female on chronic hemodialysis for 20 years.  She was having dialysis and went into a rapid heart rate.  Today she was having dialysis and developed an SVT rate of 154.  Her systolic pressure was in the 90s.  Some fluid was return to her increased to 110 subsequently 2.5 mg of IV metoprolol was given.  Dialysis was discontinue she converted into normal sinus rhythm.  During the episode she was describing some chest tightness and palpitations.  She denied any shortness of breath      Review of patient's allergies indicates:   Allergen Reactions    Ace inhibitors Other (See Comments)    Betadine [povidone-iodine] Rash    Dye Other (See Comments)    Gentamicin Rash       REVIEW OF SYSTEMS  CARDIOVASCULAR:  No palpitations no chest pain  RESPIRATORY: No recent fever, cough chills, SOB or congestion  : No blood in the urine  GI: No Nausea, vomiting, constipation, diarrhea, blood, or reflux.  MUSCULOSKELETAL: No myalgias  NEURO: No lightheadedness or dizziness  EYES: No Double vision, blurry, vision or headache     OBJECTIVE     VITAL SIGNS (Most Recent)  Temp: 97.2 °F (36.2 °C) (03/04/23 0722)  Pulse: 85 (03/03/23 2100)  Resp: 11 (03/03/23 2100)  BP: (!) 126/58 (03/03/23 2100)  SpO2: 95 % (03/03/23 2100)    VENTILATION STATUS  Resp: 11 (03/03/23 2100)  SpO2: 95 % (03/03/23 2100)       I & O (Last 24H):  Intake/Output Summary (Last 24  hours) at 3/4/2023 0947  Last data filed at 3/4/2023 0606  Gross per 24 hour   Intake 340 ml   Output 783 ml   Net -443 ml         WEIGHTS  Wt Readings from Last 1 Encounters:   03/03/23 0930 119.7 kg (264 lb)   03/01/23 2008 119.9 kg (264 lb 5.3 oz)   03/01/23 1606 123.4 kg (272 lb)       PHYSICAL EXAM  CONSTITUTIONAL: Well built, well nourished in no apparent distress  NECK: no carotid bruit, no JVD  LUNGS: CTA  CHEST WALL: no tenderness  HEART: regular rate and rhythm,   ABDOMEN: soft, non-tender; bowel sounds normal; no masses,  no organomegaly  EXTREMITIES: Extremities normal, no edema  NEURO: AAO X 3    SCHEDULED MEDS:   apixaban  2.5 mg Oral BID    metoprolol succinate  50 mg Oral Daily    mupirocin   Nasal BID    senna-docusate 8.6-50 mg  1 tablet Oral BID       CONTINUOUS INFUSIONS:    PRN MEDS:acetaminophen, acetaminophen, dextrose 10%, dextrose 10%, dextrose, dextrose, glucagon (human recombinant), HYDROcodone-acetaminophen, HYDROcodone-acetaminophen, melatonin, naloxone, ondansetron, sodium chloride 0.9%    LABS AND DIAGNOSTICS     CBC LAST 3 DAYS  Recent Labs   Lab 03/02/23  0435 03/03/23  0432 03/04/23  0449   WBC 7.51 6.74 6.50   RBC 3.71* 3.79* 3.66*   HGB 11.5* 11.7* 11.3*   HCT 35.7* 36.4* 35.4*   MCV 96 96 97   MCH 31.0 30.9 30.9   MCHC 32.2 32.1 31.9*   RDW 14.2 14.0 14.0    158 151   MPV 11.0 10.9 10.7   GRAN 54.8  4.1 48.6  3.3 46.0  3.0   LYMPH 30.1  2.3 33.8  2.3 38.2  2.5   MONO 11.9  0.9 13.9  0.9 12.3  0.8   BASO 0.03 0.02 0.02   NRBC 0 0 0         COAGULATION LAST 3 DAYS  No results for input(s): LABPT, INR, APTT in the last 168 hours.    CHEMISTRY LAST 3 DAYS  Recent Labs   Lab 03/02/23  0435 03/03/23  0432 03/04/23  0449    141 143   K 4.4 4.9 5.0    104 105   CO2 24 24 24   ANIONGAP 12 13 14   BUN 30* 43* 37*   CREATININE 5.3* 6.9* 7.0*   * 158* 127*   CALCIUM 7.8* 7.8* 8.0*   MG 1.8 1.9 1.9   ALBUMIN 2.5* 2.6* 2.6*   PROT 6.7 6.8 6.9   ALKPHOS 37*  35* 35*   ALT 13 11 13   AST 17 18 19   BILITOT 0.5 0.5 0.5         CARDIAC PROFILE LAST 3 DAYS  Recent Labs   Lab 03/01/23  1642 03/01/23  1833   BNP  --  314*   TROPONINI 0.074*  --          ENDOCRINE LAST 3 DAYS  Recent Labs   Lab 03/01/23  1833   TSH 2.225         LAST ARTERIAL BLOOD GAS  ABG  No results for input(s): PH, PO2, PCO2, HCO3, BE in the last 168 hours.    LAST 7 DAYS MICROBIOLOGY   Microbiology Results (last 7 days)       ** No results found for the last 168 hours. **            MOST RECENT IMAGING  Echo  · The left ventricle is normal in size with concentric remodeling and   normal systolic function.  · The estimated ejection fraction is 60%.  · Grade II left ventricular diastolic dysfunction.  · Normal right ventricular size with normal right ventricular systolic   function.  · Mild left atrial enlargement.  · Mild right atrial enlargement.  · Mild mitral regurgitation.  · The mean diastolic gradient across the mitral valve is 9 mmHg at a heart   rate of bpm.  · There is mild to moderate mitral stenosis.  · Moderate tricuspid regurgitation.  · There is pulmonary hypertension.  · Normal central venous pressure (3 mmHg).  · The estimated PA systolic pressure is 50 mmHg.         LASTECHO  No results found for this or any previous visit.      CURRENT/PREVIOUS VISIT EKG  Results for orders placed or performed during the hospital encounter of 03/01/23   EKG 12-lead    Collection Time: 03/01/23  9:00 PM    Narrative    Test Reason : R00.0,    Vent. Rate : 097 BPM     Atrial Rate : 097 BPM     P-R Int : 178 ms          QRS Dur : 124 ms      QT Int : 396 ms       P-R-T Axes : 073 260 048 degrees     QTc Int : 502 ms    Sinus rhythm with marked sinus arrythmia  Right bundle branch block  Abnormal ECG  When compared with ECG of 01-MAR-2023 16:17,  No significant change was found    Referred By: [email or web address]   SELF           Confirmed By:        ASSESSMENT/PLAN:     Active Hospital Problems    Diagnosis     *Paroxysmal atrial fibrillation    Chronic pulmonary edema    Hyperkalemia    ESRD (end stage renal disease)    Morbid obesity due to excess calories       ASSESSMENT & PLAN:   PSVT versus atrial flutter that occurs during dialysis.  She responded well to low doses of metoprolol.  Will increase the oral metoprolol.  EP recommended to try metoprolol at higher doses    End-stage renal disease on hemodialysis    Diastolic dysfunction with normal left ventricular function    Mild to moderate mitral stenosis.  Will attempt to decrease her heart rate and see how she does.      RECOMMENDATIONS:  Higher doses of metoprolol.  Renal doses of anticoagulation for potential atrial flutter  If she develops tachycardia during dialysis attempt the 2.5 mg of metoprolol IV        Grey Toscano MD  Department of Cardiology  Date of Service: 03/04/2023  10:15 AM

## 2023-03-04 NOTE — EICU
Intervention Initiated From:  [facility] / EICU    Yaritza intervened regarding:  Rounding (Video assessment)    Comments:  Video assessment complete. Pt awake in bed, voices no complaints. No acute distress noted. VS per flowsheet.

## 2023-03-04 NOTE — NURSING
Dr aguirre ordered to place PT/OT eval for them to work with pt starting tomorrow.

## 2023-03-04 NOTE — NURSING
PCU Charge Nurse IVAN Roberson, called informed resource nurse, pt having SVT episode, noted pt still in dialysis, pt alert and  bpm. Notified Dr. Barney with order. IV metoprolol given 2.5 mg. Dialysis nurse informed Dr. Frey with order. Dialysis nurse back wash and pt HR dropped to 66 bpm. Pt still alert and oriented. Will monitor.

## 2023-03-04 NOTE — NURSING
At 1412, member noted with SVT with heart rate sustained in 140's during dialysis treatment. UF turned off and 100 ml NS given to the patient. No change in rhythm noted. Patient awake and alert. Asymptomatic. Dr. Frey notified via telephone. Order received to discontinue dialysis treatment at 1422. Blood returned and patient noted to return to regular heart rhythm in 70's during rinse back. 125 minutes of ordered dialysis treatment completed with net fluid removal of 989 ml.

## 2023-03-04 NOTE — ASSESSMENT & PLAN NOTE
Patient with Paroxysmal (<7 days) atrial fibrillation which is controlled currently with no meds. Patient is currently in sinus rhythm.[id number] Score: 6. HASBLED Score. Anticoagulation - eliquis     Metoprolol dose increased if BP tolerates   eliquis   ECHO results reviewed.   Follow cardiology recommendations.

## 2023-03-04 NOTE — ASSESSMENT & PLAN NOTE
HD for volume control, though have not been able to successfully pull much volume off due to unstable hemodynamics  Patient no longer urinates so diuretics not helpful  Fluid restriction added to renal diet  Plan to remove 2-3L via HD today.

## 2023-03-05 LAB
ALBUMIN SERPL BCP-MCNC: 2.6 G/DL (ref 3.5–5.2)
ALP SERPL-CCNC: 34 U/L (ref 55–135)
ALT SERPL W/O P-5'-P-CCNC: 19 U/L (ref 10–44)
ANION GAP SERPL CALC-SCNC: 11 MMOL/L (ref 8–16)
AST SERPL-CCNC: 27 U/L (ref 10–40)
BASOPHILS # BLD AUTO: 0.03 K/UL (ref 0–0.2)
BASOPHILS NFR BLD: 0.5 % (ref 0–1.9)
BILIRUB SERPL-MCNC: 0.5 MG/DL (ref 0.1–1)
BUN SERPL-MCNC: 29 MG/DL (ref 8–23)
CALCIUM SERPL-MCNC: 8.6 MG/DL (ref 8.7–10.5)
CHLORIDE SERPL-SCNC: 105 MMOL/L (ref 95–110)
CO2 SERPL-SCNC: 23 MMOL/L (ref 23–29)
CREAT SERPL-MCNC: 6.1 MG/DL (ref 0.5–1.4)
DIFFERENTIAL METHOD: ABNORMAL
EOSINOPHIL # BLD AUTO: 0.2 K/UL (ref 0–0.5)
EOSINOPHIL NFR BLD: 3.3 % (ref 0–8)
ERYTHROCYTE [DISTWIDTH] IN BLOOD BY AUTOMATED COUNT: 13.7 % (ref 11.5–14.5)
EST. GFR  (NO RACE VARIABLE): 7 ML/MIN/1.73 M^2
GLUCOSE SERPL-MCNC: 114 MG/DL (ref 70–110)
HCT VFR BLD AUTO: 37.4 % (ref 37–48.5)
HGB BLD-MCNC: 12 G/DL (ref 12–16)
IMM GRANULOCYTES # BLD AUTO: 0.04 K/UL (ref 0–0.04)
IMM GRANULOCYTES NFR BLD AUTO: 0.7 % (ref 0–0.5)
LYMPHOCYTES # BLD AUTO: 2.2 K/UL (ref 1–4.8)
LYMPHOCYTES NFR BLD: 36.2 % (ref 18–48)
MCH RBC QN AUTO: 30.8 PG (ref 27–31)
MCHC RBC AUTO-ENTMCNC: 32.1 G/DL (ref 32–36)
MCV RBC AUTO: 96 FL (ref 82–98)
MONOCYTES # BLD AUTO: 0.7 K/UL (ref 0.3–1)
MONOCYTES NFR BLD: 11.6 % (ref 4–15)
NEUTROPHILS # BLD AUTO: 2.9 K/UL (ref 1.8–7.7)
NEUTROPHILS NFR BLD: 47.7 % (ref 38–73)
NRBC BLD-RTO: 0 /100 WBC
PLATELET # BLD AUTO: 148 K/UL (ref 150–450)
PMV BLD AUTO: 11.1 FL (ref 9.2–12.9)
POTASSIUM SERPL-SCNC: 4.7 MMOL/L (ref 3.5–5.1)
PROT SERPL-MCNC: 6.8 G/DL (ref 6–8.4)
RBC # BLD AUTO: 3.89 M/UL (ref 4–5.4)
SODIUM SERPL-SCNC: 139 MMOL/L (ref 136–145)
WBC # BLD AUTO: 6.14 K/UL (ref 3.9–12.7)

## 2023-03-05 PROCEDURE — 36415 COLL VENOUS BLD VENIPUNCTURE: CPT | Performed by: INTERNAL MEDICINE

## 2023-03-05 PROCEDURE — 80053 COMPREHEN METABOLIC PANEL: CPT | Performed by: INTERNAL MEDICINE

## 2023-03-05 PROCEDURE — 20600001 HC STEP DOWN PRIVATE ROOM

## 2023-03-05 PROCEDURE — 94761 N-INVAS EAR/PLS OXIMETRY MLT: CPT

## 2023-03-05 PROCEDURE — 99232 SBSQ HOSP IP/OBS MODERATE 35: CPT | Mod: ,,, | Performed by: INTERNAL MEDICINE

## 2023-03-05 PROCEDURE — 99232 PR SUBSEQUENT HOSPITAL CARE,LEVL II: ICD-10-PCS | Mod: ,,, | Performed by: INTERNAL MEDICINE

## 2023-03-05 PROCEDURE — 25000003 PHARM REV CODE 250: Performed by: HOSPITALIST

## 2023-03-05 PROCEDURE — 25000003 PHARM REV CODE 250: Performed by: INTERNAL MEDICINE

## 2023-03-05 PROCEDURE — 85025 COMPLETE CBC W/AUTO DIFF WBC: CPT | Performed by: INTERNAL MEDICINE

## 2023-03-05 RX ADMIN — MUPIROCIN: 20 OINTMENT TOPICAL at 08:03

## 2023-03-05 RX ADMIN — APIXABAN 2.5 MG: 2.5 TABLET, FILM COATED ORAL at 08:03

## 2023-03-05 RX ADMIN — DOCUSATE SODIUM AND SENNOSIDES 1 TABLET: 8.6; 5 TABLET, FILM COATED ORAL at 08:03

## 2023-03-05 RX ADMIN — METOPROLOL SUCCINATE 50 MG: 50 TABLET, EXTENDED RELEASE ORAL at 08:03

## 2023-03-05 NOTE — PROGRESS NOTES
INPATIENT NEPHROLOGY PROGRESS  [facility] NEPHROLOGY    Isabella Boone  03/05/2023    Reason for consultation:    esrd    Chief Complaint:   Chief Complaint   Patient presents with    Irregular Heart Beat     Pt went into SVT while doing her dialysis treatment.  Pt tx to ed via ems.  Ems advised pt converted from SVT during transport with no interventions.            History of Present Illness:    Per H and P     Isabella Boone is a 77 y.o. female who presents to the ED via EMS with an irregular heartbeat when she was at dialysis before arriving to the ED. History obtained from an independent historian: EMS states that the patient was in the process of completing dialysis when her heart rate went to 160 bpm. The patient denies chest pain, SOB, or any other symptoms at this time. She reports making very little urine as her baseline. The patient has a PMHx of CHF, DM, and HTN. Patient lives at [facility] in [place], [place].      On workup, CXR significant for pulmonary edema. EKG was NSR. No hyperkalemia present and CBC in normal range. Nephrology was consulted with plans to do HD. The patient will be monitored on telemetry    3/2  HD stopped earlier due to AFIB rvr.   No nausea, chest pain, sob, fever, urinary or bowel complaint, new neurologic symptoms, new joint pain  3/3  hd stopped again due to AFIB rvr.  No chest pain or sob at this time  3/4  Lab results reviewed, pressures stable.  No complaints.  HD later today.  3/5  Pt got 125 min of HD yesterday, then tx terminated 2/2 SVT w/sustained rate 140's.  Net UF 1L.  Cardiology making metoprolol adjustments.  Will attempt HD again tomorrow.      Plan of Care:       Assessment:      esrd  --HD MWF  --fluid restrict  --renal dose medication per routine  --continue outpt medication  --continue binders with meals    Anemia  --erythropoiesis stimulating agent with renal replacement therapy if hg goes below 10    Hyperphosphatemia--normal today  --renal diet  --continue  binders if phos goes above 5.5    Hypertension  --uf with hd  --fluid restrict  --low salt diet  --continue home medication    Edema  --fluid restrict  --uf as tolerated at hd    Afib  --cardiology consulted.  Metoprolol adjustments per cards  --on apixaban             Thank you for allowing us to participate in this patient's care. We will continue to follow.    Vital Signs:  Temp Readings from Last 3 Encounters:   03/05/23 97.1 °F (36.2 °C) (Tympanic)   09/27/17 97 °F (36.1 °C)       Pulse Readings from Last 3 Encounters:   03/05/23 72   09/27/17 70       BP Readings from Last 3 Encounters:   03/05/23 125/63   09/27/17 (!) 116/59       Weight:  Wt Readings from Last 3 Encounters:   03/05/23 125.8 kg (277 lb 5.4 oz)   09/22/17 123.4 kg (272 lb)       Past Medical & Surgical History:  Past Medical History:   Diagnosis Date    Arthritis     CHF (congestive heart failure)     Diabetes mellitus     Encounter for blood transfusion     Hypertension        Past Surgical History:   Procedure Laterality Date    EYE SURGERY      cataracts    HYSTERECTOMY      patial        Past Social History:  Social History     Socioeconomic History    Marital status: Single   Tobacco Use    Smoking status: Never    Smokeless tobacco: Never   Substance and Sexual Activity    Alcohol use: No    Drug use: No    Sexual activity: Never       Medications:  No current facility-administered medications on file prior to encounter.     Current Outpatient Medications on File Prior to Encounter   Medication Sig Dispense Refill    ascorbic acid, vitamin C, (VITAMIN C) 500 MG tablet Take 500 mg by mouth once daily. Give 2 tablets daily      calcium carbonate (TUMS) 200 mg calcium (500 mg) chewable tablet Take 1 tablet by mouth once daily. Take 2 tablets with meals and 3 tablets at night      gabapentin (NEURONTIN) 100 MG capsule Take 100 mg by mouth every evening.      loratadine (CLARITIN) 10 mg tablet Take 10 mg by mouth once daily.      megestroL  "(MEGACE) 400 mg/10 mL (40 mg/mL) Susp Take 400 mg by mouth 2 (two) times daily.      midodrine (PROAMATINE) 5 MG Tab Take 5 mg by mouth every 12 (twelve) hours.      VIT BCOMP,C/FOLIC ACID/ZINC (B COMPLEX-C-FOLIC ACID-ZN ORAL) Take by mouth.      hydrocodone-acetaminophen 5-325mg (NORCO) 5-325 mg per tablet Take 1 tablet by mouth every 6 (six) hours as needed. 15 tablet 0    ipratropium (ATROVENT HFA) 17 mcg/actuation inhaler Inhale 1 puff into the lungs every 6 (six) hours as needed for Wheezing. Rescue      oxyCODONE (OXY-IR) 5 mg Cap Take 5 mg by mouth every 4 (four) hours as needed for Pain. Give 0.5 tablet by mouth every 4hrs as needed for pain       Scheduled Meds:   apixaban  2.5 mg Oral BID    metoprolol succinate  50 mg Oral Daily    mupirocin   Nasal BID    senna-docusate 8.6-50 mg  1 tablet Oral BID    sodium chloride 0.9%  250 mL Intravenous Once     Continuous Infusions:  PRN Meds:.acetaminophen, acetaminophen, dextrose 10%, dextrose 10%, dextrose, dextrose, glucagon (human recombinant), HYDROcodone-acetaminophen, HYDROcodone-acetaminophen, melatonin, naloxone, ondansetron, sodium chloride 0.9%    Allergies:  Ace inhibitors, Betadine [povidone-iodine], Dye, and Gentamicin    Past Family History:  Reviewed; refer to Hospitalist Admission Note    Review of Systems:  Review of Systems - All 14 systems reviewed and negative, except as noted in HPI    Physical Exam:    /63   Pulse 72   Temp 97.1 °F (36.2 °C) (Tympanic)   Resp 12   Ht 5' 5" (1.651 m)   Wt 125.8 kg (277 lb 5.4 oz)   LMP  (LMP Unknown)   SpO2 100%   Breastfeeding No   BMI 46.15 kg/m²     General Appearance:    Alert, cooperative, no distress, appears stated age   Head:    Normocephalic, without obvious abnormality, atraumatic   Eyes:    PER, conjunctiva/corneas clear, EOM's intact in both eyes        Throat:   Lips, mucosa, and tongue normal; teeth and gums normal   Back:     Symmetric, no curvature, ROM normal, no CVA tenderness "   Lungs:     Clear to auscultation bilaterally, respirations unlabored   Chest wall:    No tenderness or deformity   Heart:    Regular rate and rhythm, S1 and S2 normal, no murmur, rub   or gallop   Abdomen:     Soft, non-tender, bowel sounds active all four quadrants,     no masses, no organomegaly   Extremities:   Extremities normal, atraumatic, no cyanosis.  2 plus edema   Pulses:   2+ and symmetric all extremities   MSK:   No joint or muscle swelling, tenderness or deformity   Skin:   Skin color, texture, turgor normal, no rashes or lesions   Neurologic:   CNII-XII intact, normal strength and sensation       Throughout.  No flap     Results:  Lab Results   Component Value Date     03/05/2023    K 4.7 03/05/2023     03/05/2023    CO2 23 03/05/2023    BUN 29 (H) 03/05/2023    CREATININE 6.1 (H) 03/05/2023    CALCIUM 8.6 (L) 03/05/2023    ANIONGAP 11 03/05/2023    ESTGFRAFRICA 5 (A) 09/27/2017    EGFRNONAA 4 (A) 09/27/2017       Lab Results   Component Value Date    CALCIUM 8.6 (L) 03/05/2023    PHOS 5.0 (H) 03/04/2023       Recent Labs   Lab 03/05/23  0605   WBC 6.14   RBC 3.89*   HGB 12.0   HCT 37.4   *   MCV 96   MCH 30.8   MCHC 32.1          Imaging Results              X-Ray Chest AP Portable (Final result)  Result time 03/01/23 16:43:22      Final result by Candy Diaz MD (03/01/23 16:43:22)                   Impression:      Central pulmonary vascular congestion and probable mild pulmonary edema.      Electronically signed by: Candy Diaz  Date:    03/01/2023  Time:    16:43               Narrative:    EXAMINATION:  XR CHEST AP PORTABLE    CLINICAL HISTORY:  Tachycardia, unspecified    TECHNIQUE:  Single view of the chest was obtained.    COMPARISON:  None    FINDINGS:  Low lung volumes accentuating the cardiomediastinal contour.  Central pulmonary vascular congestion.  Atherosclerotic calcification of the aortic arch.  Perihilar and basilar predominant central opacities.  No large  pleural effusion or pneumothorax.                                    Patient care was time spent personally by me on the following activities:   Obtaining a history  Examination of patient.  Providing medical care at the patients bedside.  Developing a treatment plan with patient or surrogate and bedside caregivers  Ordering and reviewing laboratory studies, radiographic studies, pulse oximetry.  Ordering and performing treatments and interventions.  Evaluation of patient's response to treatment.  Discussions with consultants while on the unit and immediately available to the patient.  Re-evaluation of the patient's condition.  Documentation in the medical record.     Piedad Campos NP    Nephrology  [facility] [place]  [phone]

## 2023-03-05 NOTE — PT/OT/SLP PROGRESS
Physical Therapy      Patient Name:  Isabella Boone   MRN:  [id number]    Spent 8 minutes in room speaking with patient, assessing LE strength.  Will attempt functional mobility as appropriate 3/06 when full staff available.

## 2023-03-05 NOTE — PLAN OF CARE
Problem: Hemodynamic Instability (Hemodialysis)  Goal: Effective Tissue Perfusion  Outcome: Ongoing, Progressing     Problem: Adult Inpatient Plan of Care  Goal: Optimal Comfort and Wellbeing  Outcome: Ongoing, Progressing     Problem: Impaired Wound Healing  Goal: Optimal Wound Healing  Outcome: Ongoing, Progressing   Pt remains in bed, continuous monitoring in place. Vss. Turn Q2. Wound care provided. Heel boots in place. Bed alarm set, call light in reach.

## 2023-03-05 NOTE — PROGRESS NOTES
[facility] Hospital Medicine  Progress Note    Patient Name: Isabella Boone  MRN: [id number]  Patient Class: IP- Inpatient   Admission Date: 3/1/2023  Length of Stay: 2 days  Attending Physician: Joce Barney MD  Primary Care Provider: Tin Yousif MD        Subjective:     Principal Problem:Paroxysmal atrial fibrillation        HPI:  Isabella Boone is a 77 y.o. female who presents to the ED via EMS with an irregular heartbeat when she was at dialysis before arriving to the ED. History obtained from an independent historian: EMS states that the patient was in the process of completing dialysis when her heart rate went to 160 bpm. The patient denies chest pain, SOB, or any other symptoms at this time. She reports making very little urine as her baseline. The patient has a PMHx of CHF, DM, and HTN. Patient lives at [facility] in [place], [place].     On workup, CXR significant for pulmonary edema. EKG was NSR. No hyperkalemia present and CBC in normal range. Nephrology was consulted with plans to do HD. The patient will be monitored on telemetry.      Overview/Hospital Course:  Patient admitted from HD center with report of afib RVR during HD. On ED arrival she was NSR but CXR showed pulmonary edema. She has been attempted on HD x 2 and continues to have either SVT or atrial fibrillation. Cardiology was consulted. ECHO pending (system went down). Metoprolol dose increased and patient started on eliquis. Nephrology following and will re-attempt HD in am (3/4/23). Wound care addressing chronic wounds to sacrum/gluteal areas. Plan for dc to NH when medically stable.       Interval History: Patient seen and examined; Immediately after HD yesterday, went into /min, required IV Lopressor 2.5 mg x 1. Rate controlled, in NSR. Denies any SOB or CP. Patient's  is at bedside.    Review of Systems   Constitutional: Negative.    Respiratory: Negative.     Cardiovascular:  Positive for leg  swelling.   Gastrointestinal: Negative.    Genitourinary:  Positive for enuresis.   Musculoskeletal:  Positive for arthralgias, back pain and gait problem.   Neurological:  Positive for weakness and numbness.   Psychiatric/Behavioral: Negative.     Objective:     Vital Signs (Most Recent):  Temp: 97.1 °F (36.2 °C) (03/05/23 0900)  Pulse: 82 (03/05/23 0900)  Resp: 17 (03/05/23 0900)  BP: 125/63 (03/05/23 0900)  SpO2: 100 % (03/05/23 0900)   Vital Signs (24h Range):  Temp:  [97 °F (36.1 °C)-98.8 °F (37.1 °C)] 97.1 °F (36.2 °C)  Pulse:  [] 82  Resp:  [12-22] 17  SpO2:  [94 %-100 %] 100 %  BP: (109-149)/(55-73) 125/63     Weight: 125.8 kg (277 lb 5.4 oz)  Body mass index is 46.15 kg/m².    Intake/Output Summary (Last 24 hours) at 3/5/2023 1011  Last data filed at 3/4/2023 1727  Gross per 24 hour   Intake 350 ml   Output 1589 ml   Net -1239 ml        Physical Exam  Constitutional:       Appearance: Normal appearance. She is not ill-appearing.   HENT:      Head: Normocephalic and atraumatic.   Cardiovascular:      Rate and Rhythm: Normal rate and regular rhythm.      Pulses: Normal pulses.      Heart sounds: Murmur heard.   Pulmonary:      Effort: Pulmonary effort is normal.      Breath sounds: Rales present.   Abdominal:      General: Bowel sounds are normal. There is no distension.      Palpations: Abdomen is soft.      Tenderness: There is no abdominal tenderness.   Musculoskeletal:      Cervical back: Normal range of motion and neck supple.   Skin:     Capillary Refill: Capillary refill takes 2 to 3 seconds.   Neurological:      Mental Status: She is alert and oriented to person, place, and time. Mental status is at baseline.      Motor: Weakness present.      Gait: Gait abnormal.      Deep Tendon Reflexes: Reflexes abnormal.   Psychiatric:         Mood and Affect: Mood normal.         Behavior: Behavior normal.       Significant Labs:   Lab Results   Component Value Date    WBC 6.14 03/05/2023    HGB 12.0  03/05/2023    HCT 37.4 03/05/2023    MCV 96 03/05/2023     (L) 03/05/2023     CMP  Sodium   Date Value Ref Range Status   03/05/2023 139 136 - 145 mmol/L Final     Potassium   Date Value Ref Range Status   03/05/2023 4.7 3.5 - 5.1 mmol/L Final     Chloride   Date Value Ref Range Status   03/05/2023 105 95 - 110 mmol/L Final     CO2   Date Value Ref Range Status   03/05/2023 23 23 - 29 mmol/L Final     Glucose   Date Value Ref Range Status   03/05/2023 114 (H) 70 - 110 mg/dL Final     BUN   Date Value Ref Range Status   03/05/2023 29 (H) 8 - 23 mg/dL Final     Creatinine   Date Value Ref Range Status   03/05/2023 6.1 (H) 0.5 - 1.4 mg/dL Final     Calcium   Date Value Ref Range Status   03/05/2023 8.6 (L) 8.7 - 10.5 mg/dL Final     Total Protein   Date Value Ref Range Status   03/05/2023 6.8 6.0 - 8.4 g/dL Final     Albumin   Date Value Ref Range Status   03/05/2023 2.6 (L) 3.5 - 5.2 g/dL Final     Total Bilirubin   Date Value Ref Range Status   03/05/2023 0.5 0.1 - 1.0 mg/dL Final     Comment:     For infants and newborns, interpretation of results should be based  on gestational age, weight and in agreement with clinical  observations.    Premature Infant recommended reference ranges:  Up to 24 hours.............<8.0 mg/dL  Up to 48 hours............<12.0 mg/dL  3-5 days..................<15.0 mg/dL  6-29 days.................<15.0 mg/dL       Alkaline Phosphatase   Date Value Ref Range Status   03/05/2023 34 (L) 55 - 135 U/L Final     AST   Date Value Ref Range Status   03/05/2023 27 10 - 40 U/L Final     ALT   Date Value Ref Range Status   03/05/2023 19 10 - 44 U/L Final     Anion Gap   Date Value Ref Range Status   03/05/2023 11 8 - 16 mmol/L Final     eGFR   Date Value Ref Range Status   03/05/2023 7 (A) >60 mL/min/1.73 m^2 Final     Microbiology Results (last 7 days)       ** No results found for the last 168 hours. **          Significant Imaging:   ECHO:   The left ventricle is normal in size with  concentric remodeling and normal systolic function.   The estimated ejection fraction is 60%.   Grade II left ventricular diastolic dysfunction.   Normal right ventricular size with normal right ventricular systolic function.   Mild left atrial enlargement.   Mild right atrial enlargement.   Mild mitral regurgitation.   The mean diastolic gradient across the mitral valve is 9 mmHg at a heart rate of bpm.   There is mild to moderate mitral stenosis.   Moderate tricuspid regurgitation.   There is pulmonary hypertension.   Normal central venous pressure (3 mmHg).   The estimated PA systolic pressure is 50 mmHg.    US lower back soft-tissue:   On limited assessment, no acute finding in the area of concern.     CXR: Central pulmonary vascular congestion and probable mild pulmonary edema.         Assessment/Plan:      * Paroxysmal atrial fibrillation  Patient with Paroxysmal (<7 days) atrial fibrillation which is controlled currently with no meds. Patient is currently in sinus rhythm.[id number] Score: 6. HASBLED Score. Anticoagulation - eliquis     Metoprolol dose increased if BP tolerates   eliquis   ECHO results reviewed.   Follow cardiology recommendations.         Chronic pulmonary edema  HD for volume control, though have not been able to successfully pull much volume off due to unstable hemodynamics  Patient no longer urinates so diuretics not helpful  Fluid restriction added to renal diet  Plan to remove 2-3L via HD today.       Hyperkalemia  Follow BMP, follow nephrology recommendations.       Morbid obesity due to excess calories  Body mass index is 43.93 kg/m². Morbid obesity complicates all aspects of disease management from diagnostic modalities to treatment. Weight loss encouraged and health benefits explained to patient.         ESRD (end stage renal disease)  Consult nephrology  HD MWF - home regimen  Failed attempts x 2 since admission  Scheduled for HD today.         Likely back to [facility]  in am, discussed with RN and patient's .   VTE Risk Mitigation (From admission, onward)         Ordered     apixaban tablet 2.5 mg  2 times daily         03/04/23 0946     IP VTE HIGH RISK PATIENT  Once         03/01/23 1808     Place sequential compression device  Until discontinued         03/01/23 1808                Discharge Planning   HAN: 3/6/2023     Code Status: Full Code   Is the patient medically ready for discharge?:     Reason for patient still in hospital (select all that apply): Patient trending condition and Consult recommendations  Discharge Plan A: Return to nursing home                  Joce Barney MD  Department of Hospital Medicine   [facility]

## 2023-03-05 NOTE — PROGRESS NOTES
INPATIENT NEPHROLOGY PROGRESS  [facility] NEPHROLOGY    Isabella Boone  03/05/2023    Reason for consultation:  esrd    Chief Complaint:   Chief Complaint   Patient presents with    Irregular Heart Beat     Pt went into SVT while doing her dialysis treatment.  Pt tx to ed via ems.  Ems advised pt converted from SVT during transport with no interventions.       History of Present Illness:    Per H and P     Isabella Boone is a 77 y.o. female who presents to the ED via EMS with an irregular heartbeat when she was at dialysis before arriving to the ED. History obtained from an independent historian: EMS states that the patient was in the process of completing dialysis when her heart rate went to 160 bpm. The patient denies chest pain, SOB, or any other symptoms at this time. She reports making very little urine as her baseline. The patient has a PMHx of CHF, DM, and HTN. Patient lives at [facility] in [place], [place].      On workup, CXR significant for pulmonary edema. EKG was NSR. No hyperkalemia present and CBC in normal range. Nephrology was consulted with plans to do HD. The patient will be monitored on telemetry    3/2  HD stopped earlier due to AFIB rvr.   No nausea, chest pain, sob, fever, urinary or bowel complaint, new neurologic symptoms, new joint pain  3/3  hd stopped again due to AFIB rvr.  No chest pain or sob at this time  3/4  Lab results reviewed, pressures stable.  No complaints.  HD later today.  3/5 VSS . Had to terminate HD yesterday after 2h 2/2 AF/SVT. 1L UF achieved.    Plan of Care:    esrd  --reattempt hd 3/4  --fluid restrict  --renal dose medication per routine  --continue outpt medication  --continue binders with meals    Anemia  --erythropoiesis stimulating agent with renal replacement therapy if hg goes below 10    Hyperphosphatemia  --renal diet  --continue binders if phos goes above 5.5    Hypertension  --uf with hd  --fluid restrict  --low salt diet  --continue home  medication    Edema  --fluid restrict  --uf as tolerated at hd    Afib  --cardiology consulted.  Metoprolol adjustments per cards  --on apixaban    Thank you for allowing us to participate in this patient's care. We will continue to follow.    Vital Signs:  Temp Readings from Last 3 Encounters:   03/05/23 97.8 °F (36.6 °C) (Tympanic)   09/27/17 97 °F (36.1 °C)       Pulse Readings from Last 3 Encounters:   03/05/23 65   09/27/17 70       BP Readings from Last 3 Encounters:   03/05/23 125/63   09/27/17 (!) 116/59       Weight:  Wt Readings from Last 3 Encounters:   03/05/23 125.8 kg (277 lb 5.4 oz)   09/22/17 123.4 kg (272 lb)       Past Medical & Surgical History:  Past Medical History:   Diagnosis Date    Arthritis     CHF (congestive heart failure)     Diabetes mellitus     Encounter for blood transfusion     Hypertension        Past Surgical History:   Procedure Laterality Date    EYE SURGERY      cataracts    HYSTERECTOMY      patial        Past Social History:  Social History     Socioeconomic History    Marital status: Single   Tobacco Use    Smoking status: Never    Smokeless tobacco: Never   Substance and Sexual Activity    Alcohol use: No    Drug use: No    Sexual activity: Never       Medications:  No current facility-administered medications on file prior to encounter.     Current Outpatient Medications on File Prior to Encounter   Medication Sig Dispense Refill    ascorbic acid, vitamin C, (VITAMIN C) 500 MG tablet Take 500 mg by mouth once daily. Give 2 tablets daily      calcium carbonate (TUMS) 200 mg calcium (500 mg) chewable tablet Take 1 tablet by mouth once daily. Take 2 tablets with meals and 3 tablets at night      gabapentin (NEURONTIN) 100 MG capsule Take 100 mg by mouth every evening.      loratadine (CLARITIN) 10 mg tablet Take 10 mg by mouth once daily.      megestroL (MEGACE) 400 mg/10 mL (40 mg/mL) Susp Take 400 mg by mouth 2 (two) times daily.      midodrine (PROAMATINE) 5 MG Tab Take 5  "mg by mouth every 12 (twelve) hours.      VIT BCOMP,C/FOLIC ACID/ZINC (B COMPLEX-C-FOLIC ACID-ZN ORAL) Take by mouth.      hydrocodone-acetaminophen 5-325mg (NORCO) 5-325 mg per tablet Take 1 tablet by mouth every 6 (six) hours as needed. 15 tablet 0    ipratropium (ATROVENT HFA) 17 mcg/actuation inhaler Inhale 1 puff into the lungs every 6 (six) hours as needed for Wheezing. Rescue      oxyCODONE (OXY-IR) 5 mg Cap Take 5 mg by mouth every 4 (four) hours as needed for Pain. Give 0.5 tablet by mouth every 4hrs as needed for pain       Scheduled Meds:   apixaban  2.5 mg Oral BID    metoprolol succinate  50 mg Oral Daily    mupirocin   Nasal BID    senna-docusate 8.6-50 mg  1 tablet Oral BID    sodium chloride 0.9%  250 mL Intravenous Once     Continuous Infusions:  PRN Meds:.acetaminophen, acetaminophen, dextrose 10%, dextrose 10%, dextrose, dextrose, glucagon (human recombinant), HYDROcodone-acetaminophen, HYDROcodone-acetaminophen, melatonin, naloxone, ondansetron, sodium chloride 0.9%    Allergies:  Ace inhibitors, Betadine [povidone-iodine], Dye, and Gentamicin    Past Family History:  Reviewed; refer to Hospitalist Admission Note    Review of Systems:  Review of Systems - All 14 systems reviewed and negative, except as noted in HPI    Physical Exam:    /63   Pulse 65   Temp 97.8 °F (36.6 °C) (Tympanic)   Resp 13   Ht 5' 5" (1.651 m)   Wt 125.8 kg (277 lb 5.4 oz)   LMP  (LMP Unknown)   SpO2 100%   Breastfeeding No   BMI 46.15 kg/m²     General Appearance:    Alert, cooperative, no distress, appears stated age   Head:    Normocephalic, without obvious abnormality, atraumatic   Eyes:    PER, conjunctiva/corneas clear, EOM's intact in both eyes        Throat:   Lips, mucosa, and tongue normal; teeth and gums normal   Back:     Symmetric, no curvature, ROM normal, no CVA tenderness   Lungs:     Clear to auscultation bilaterally, respirations unlabored   Chest wall:    No tenderness or deformity   Heart: "    Regular rate and rhythm, S1 and S2 normal, no murmur, rub   or gallop   Abdomen:     Soft, non-tender, bowel sounds active all four quadrants,     no masses, no organomegaly   Extremities:   Extremities normal, atraumatic, no cyanosis.  2 plus edema   Pulses:   2+ and symmetric all extremities   MSK:   No joint or muscle swelling, tenderness or deformity   Skin:   Skin color, texture, turgor normal, no rashes or lesions   Neurologic:   CNII-XII intact, normal strength and sensation       Throughout.  No flap     Results:  Lab Results   Component Value Date     03/05/2023    K 4.7 03/05/2023     03/05/2023    CO2 23 03/05/2023    BUN 29 (H) 03/05/2023    CREATININE 6.1 (H) 03/05/2023    CALCIUM 8.6 (L) 03/05/2023    ANIONGAP 11 03/05/2023    ESTGFRAFRICA 5 (A) 09/27/2017    EGFRNONAA 4 (A) 09/27/2017       Lab Results   Component Value Date    CALCIUM 8.6 (L) 03/05/2023    PHOS 5.0 (H) 03/04/2023       Recent Labs   Lab 03/05/23  0605   WBC 6.14   RBC 3.89*   HGB 12.0   HCT 37.4   *   MCV 96   MCH 30.8   MCHC 32.1          Imaging Results              X-Ray Chest AP Portable (Final result)  Result time 03/01/23 16:43:22      Final result by Candy Diaz MD (03/01/23 16:43:22)                   Impression:      Central pulmonary vascular congestion and probable mild pulmonary edema.      Electronically signed by: Candy Diaz  Date:    03/01/2023  Time:    16:43               Narrative:    EXAMINATION:  XR CHEST AP PORTABLE    CLINICAL HISTORY:  Tachycardia, unspecified    TECHNIQUE:  Single view of the chest was obtained.    COMPARISON:  None    FINDINGS:  Low lung volumes accentuating the cardiomediastinal contour.  Central pulmonary vascular congestion.  Atherosclerotic calcification of the aortic arch.  Perihilar and basilar predominant central opacities.  No large pleural effusion or pneumothorax.                                    Patient care was time spent personally by me on the  following activities:   Obtaining a history  Examination of patient.  Providing medical care at the patients bedside.  Developing a treatment plan with patient or surrogate and bedside caregivers  Ordering and reviewing laboratory studies, radiographic studies, pulse oximetry.  Ordering and performing treatments and interventions.  Evaluation of patient's response to treatment.  Discussions with consultants while on the unit and immediately available to the patient.  Re-evaluation of the patient's condition.  Documentation in the medical record.     Robert Frey MD    Nephrology  [facility]  [phone]

## 2023-03-05 NOTE — EICU
Intervention Initiated From:  [facility] / [facility]    Yaritza intervened regarding:  Rounding (Video assessment)    Nurse Notified:  No    Doctor Notified:  No    Comments: Video rounding completed. VSS. Awake and alert. NAD.

## 2023-03-05 NOTE — PLAN OF CARE
Problem: Device-Related Complication Risk (Hemodialysis)  Goal: Safe, Effective Therapy Delivery  Outcome: Ongoing, Progressing     Problem: Hemodynamic Instability (Hemodialysis)  Goal: Effective Tissue Perfusion  Outcome: Ongoing, Progressing     Problem: Infection (Hemodialysis)  Goal: Absence of Infection Signs and Symptoms  Outcome: Ongoing, Progressing     Problem: Adult Inpatient Plan of Care  Goal: Plan of Care Review  Outcome: Ongoing, Progressing  Goal: Patient-Specific Goal (Individualized)  Outcome: Ongoing, Progressing  Goal: Absence of Hospital-Acquired Illness or Injury  Outcome: Ongoing, Progressing  Goal: Optimal Comfort and Wellbeing  Outcome: Ongoing, Progressing  Goal: Readiness for Transition of Care  Outcome: Ongoing, Progressing     Problem: Bariatric Environmental Safety  Goal: Safety Maintained with Care  Outcome: Ongoing, Progressing     Problem: Impaired Wound Healing  Goal: Optimal Wound Healing  Outcome: Ongoing, Progressing     Problem: Skin Injury Risk Increased  Goal: Skin Health and Integrity  Outcome: Ongoing, Progressing

## 2023-03-05 NOTE — CARE UPDATE
03/04/23 1919   Patient Assessment/Suction   Level of Consciousness (AVPU) alert   Respiratory Effort Unlabored   PRE-TX-O2   Device (Oxygen Therapy) room air   SpO2 99 %   Pulse Oximetry Type Continuous   $ Pulse Oximetry - Multiple Charge Pulse Oximetry - Multiple   Pulse 73   Resp 16   /63

## 2023-03-05 NOTE — PROGRESS NOTES
[facility]  Department of Cardiology  Progress Note    PATIENT NAME: Isabella Boone  MRN: [id number]  TODAY'S DATE: 03/05/2023  ADMIT DATE: 3/1/2023    SUBJECTIVE     PRINCIPLE PROBLEM: Paroxysmal atrial fibrillation    INTERVAL HISTORY:    3/5/2023  She is feeling much better today.  Heart rate 76 blood pressure 124/76.  She developed PSVT yesterday during dialysis she was given 2.5 of IV metoprolol with conversion to normal sinus rhythm.  She denied any chest pain during the episode.    3/4/2023  She is feeling okay she denies any chest pains any shortness of breath.  She is set up for dialysis today.  Will increase the doses of metoprolol.  The echocardiogram revealed diastolic dysfunction with mild to moderate degree of mitral stenosis.  The study is a technically difficult study.    3/3/2023  Ms. Boone is a 77-year-old female on chronic hemodialysis for 20 years.  She was having dialysis and went into a rapid heart rate.  Today she was having dialysis and developed an SVT rate of 154.  Her systolic pressure was in the 90s.  Some fluid was return to her increased to 110 subsequently 2.5 mg of IV metoprolol was given.  Dialysis was discontinue she converted into normal sinus rhythm.  During the episode she was describing some chest tightness and palpitations.  She denied any shortness of breath      Review of patient's allergies indicates:   Allergen Reactions    Ace inhibitors Other (See Comments)    Betadine [povidone-iodine] Rash    Dye Other (See Comments)    Gentamicin Rash       REVIEW OF SYSTEMS  CARDIOVASCULAR:  No palpitations no chest pain  RESPIRATORY: No recent fever, cough chills, SOB or congestion  : No blood in the urine  GI: No Nausea, vomiting, constipation, diarrhea, blood, or reflux.  MUSCULOSKELETAL: No myalgias  NEURO: No lightheadedness or dizziness  EYES: No Double vision, blurry, vision or headache     OBJECTIVE     VITAL SIGNS (Most Recent)  Temp: 97.1 °F (36.2 °C)  (03/05/23 0900)  Pulse: 82 (03/05/23 0900)  Resp: 17 (03/05/23 0900)  BP: 125/63 (03/05/23 0900)  SpO2: 100 % (03/05/23 0900)    VENTILATION STATUS  Resp: 17 (03/05/23 0900)  SpO2: 100 % (03/05/23 0900)       I & O (Last 24H):  Intake/Output Summary (Last 24 hours) at 3/5/2023 1029  Last data filed at 3/4/2023 1727  Gross per 24 hour   Intake 350 ml   Output 1589 ml   Net -1239 ml         WEIGHTS  Wt Readings from Last 1 Encounters:   03/05/23 0541 125.8 kg (277 lb 5.4 oz)   03/03/23 0930 119.7 kg (264 lb)   03/01/23 2008 119.9 kg (264 lb 5.3 oz)   03/01/23 1606 123.4 kg (272 lb)       PHYSICAL EXAM  CONSTITUTIONAL: Well built, well nourished in no apparent distress  NECK: no carotid bruit, no JVD  LUNGS: CTA  CHEST WALL: no tenderness  HEART: regular rate and rhythm,   ABDOMEN: soft, non-tender; bowel sounds normal; no masses,  no organomegaly  EXTREMITIES: Extremities normal, no edema  NEURO: AAO X 3    SCHEDULED MEDS:   apixaban  2.5 mg Oral BID    metoprolol succinate  50 mg Oral Daily    mupirocin   Nasal BID    senna-docusate 8.6-50 mg  1 tablet Oral BID    sodium chloride 0.9%  250 mL Intravenous Once       CONTINUOUS INFUSIONS:    PRN MEDS:acetaminophen, acetaminophen, dextrose 10%, dextrose 10%, dextrose, dextrose, glucagon (human recombinant), HYDROcodone-acetaminophen, HYDROcodone-acetaminophen, melatonin, naloxone, ondansetron, sodium chloride 0.9%    LABS AND DIAGNOSTICS     CBC LAST 3 DAYS  Recent Labs   Lab 03/03/23  0432 03/04/23  0449 03/05/23  0605   WBC 6.74 6.50 6.14   RBC 3.79* 3.66* 3.89*   HGB 11.7* 11.3* 12.0   HCT 36.4* 35.4* 37.4   MCV 96 97 96   MCH 30.9 30.9 30.8   MCHC 32.1 31.9* 32.1   RDW 14.0 14.0 13.7    151 148*   MPV 10.9 10.7 11.1   GRAN 48.6  3.3 46.0  3.0 47.7  2.9   LYMPH 33.8  2.3 38.2  2.5 36.2  2.2   MONO 13.9  0.9 12.3  0.8 11.6  0.7   BASO 0.02 0.02 0.03   NRBC 0 0 0         COAGULATION LAST 3 DAYS  No results for input(s): LABPT, INR, APTT in the last 168  hours.    CHEMISTRY LAST 3 DAYS  Recent Labs   Lab 03/02/23  0435 03/03/23  0432 03/04/23  0449 03/05/23  0605    141 143 139   K 4.4 4.9 5.0 4.7    104 105 105   CO2 24 24 24 23   ANIONGAP 12 13 14 11   BUN 30* 43* 37* 29*   CREATININE 5.3* 6.9* 7.0* 6.1*   * 158* 127* 114*   CALCIUM 7.8* 7.8* 8.0* 8.6*   MG 1.8 1.9 1.9  --    ALBUMIN 2.5* 2.6* 2.6* 2.6*   PROT 6.7 6.8 6.9 6.8   ALKPHOS 37* 35* 35* 34*   ALT 13 11 13 19   AST 17 18 19 27   BILITOT 0.5 0.5 0.5 0.5         CARDIAC PROFILE LAST 3 DAYS  Recent Labs   Lab 03/01/23  1642 03/01/23  1833   BNP  --  314*   TROPONINI 0.074*  --          ENDOCRINE LAST 3 DAYS  Recent Labs   Lab 03/01/23  1833   TSH 2.225         LAST ARTERIAL BLOOD GAS  ABG  No results for input(s): PH, PO2, PCO2, HCO3, BE in the last 168 hours.    LAST 7 DAYS MICROBIOLOGY   Microbiology Results (last 7 days)       ** No results found for the last 168 hours. **            MOST RECENT IMAGING  Echo  · The left ventricle is normal in size with concentric remodeling and   normal systolic function.  · The estimated ejection fraction is 60%.  · Grade II left ventricular diastolic dysfunction.  · Normal right ventricular size with normal right ventricular systolic   function.  · Mild left atrial enlargement.  · Mild right atrial enlargement.  · Mild mitral regurgitation.  · The mean diastolic gradient across the mitral valve is 9 mmHg at a heart   rate of bpm.  · There is mild to moderate mitral stenosis.  · Moderate tricuspid regurgitation.  · There is pulmonary hypertension.  · Normal central venous pressure (3 mmHg).  · The estimated PA systolic pressure is 50 mmHg.         LASTECHO  No results found for this or any previous visit.      CURRENT/PREVIOUS VISIT EKG  Results for orders placed or performed during the hospital encounter of 03/01/23   EKG 12-lead    Collection Time: 03/01/23  9:00 PM    Narrative    Test Reason : R00.0,    Vent. Rate : 097 BPM     Atrial Rate : 097  BPM     P-R Int : 178 ms          QRS Dur : 124 ms      QT Int : 396 ms       P-R-T Axes : 073 260 048 degrees     QTc Int : 502 ms    Sinus rhythm with marked sinus arrythmia  Right bundle branch block  Abnormal ECG  When compared with ECG of 01-MAR-2023 16:17,  No significant change was found    Referred By: AAAREFBRANDYN   SELF           Confirmed By:        ASSESSMENT/PLAN:     Active Hospital Problems    Diagnosis    *Paroxysmal atrial fibrillation    Chronic pulmonary edema    Hyperkalemia    ESRD (end stage renal disease)    Morbid obesity due to excess calories       ASSESSMENT & PLAN:   PSVT versus atrial flutter that occurs during dialysis.  She responded well to low doses of metoprolol.  Will increase the oral metoprolol.  EP recommended to try metoprolol at higher doses    End-stage renal disease on hemodialysis    Diastolic dysfunction with normal left ventricular function    Mild to moderate mitral stenosis.  Will attempt to decrease her heart rate and see how she does.      RECOMMENDATIONS:  On Higher doses of metoprolol.  Renal doses of anticoagulation for potential atrial flutter  If she develops tachycardia during dialysis attempt the 2.5 mg of metoprolol IV        Grey Toscano MD  Department of Cardiology  Date of Service: 03/05/2023  10:15 AM

## 2023-03-05 NOTE — SUBJECTIVE & OBJECTIVE
Interval History: Patient seen and examined; Immediately after HD yesterday, went into /min, required IV Lopressor 2.5 mg x 1. Rate controlled, in NSR. Denies any SOB or CP. Patient's  is at bedside.    Review of Systems   Constitutional: Negative.    Respiratory: Negative.     Cardiovascular:  Positive for leg swelling.   Gastrointestinal: Negative.    Genitourinary:  Positive for enuresis.   Musculoskeletal:  Positive for arthralgias, back pain and gait problem.   Neurological:  Positive for weakness and numbness.   Psychiatric/Behavioral: Negative.     Objective:     Vital Signs (Most Recent):  Temp: 97.1 °F (36.2 °C) (03/05/23 0900)  Pulse: 82 (03/05/23 0900)  Resp: 17 (03/05/23 0900)  BP: 125/63 (03/05/23 0900)  SpO2: 100 % (03/05/23 0900)   Vital Signs (24h Range):  Temp:  [97 °F (36.1 °C)-98.8 °F (37.1 °C)] 97.1 °F (36.2 °C)  Pulse:  [] 82  Resp:  [12-22] 17  SpO2:  [94 %-100 %] 100 %  BP: (109-149)/(55-73) 125/63     Weight: 125.8 kg (277 lb 5.4 oz)  Body mass index is 46.15 kg/m².    Intake/Output Summary (Last 24 hours) at 3/5/2023 1011  Last data filed at 3/4/2023 1727  Gross per 24 hour   Intake 350 ml   Output 1589 ml   Net -1239 ml        Physical Exam  Constitutional:       Appearance: Normal appearance. She is not ill-appearing.   HENT:      Head: Normocephalic and atraumatic.   Cardiovascular:      Rate and Rhythm: Normal rate and regular rhythm.      Pulses: Normal pulses.      Heart sounds: Murmur heard.   Pulmonary:      Effort: Pulmonary effort is normal.      Breath sounds: Rales present.   Abdominal:      General: Bowel sounds are normal. There is no distension.      Palpations: Abdomen is soft.      Tenderness: There is no abdominal tenderness.   Musculoskeletal:      Cervical back: Normal range of motion and neck supple.   Skin:     Capillary Refill: Capillary refill takes 2 to 3 seconds.   Neurological:      Mental Status: She is alert and oriented to person, place, and  time. Mental status is at baseline.      Motor: Weakness present.      Gait: Gait abnormal.      Deep Tendon Reflexes: Reflexes abnormal.   Psychiatric:         Mood and Affect: Mood normal.         Behavior: Behavior normal.       Significant Labs:   Lab Results   Component Value Date    WBC 6.14 03/05/2023    HGB 12.0 03/05/2023    HCT 37.4 03/05/2023    MCV 96 03/05/2023     (L) 03/05/2023     CMP  Sodium   Date Value Ref Range Status   03/05/2023 139 136 - 145 mmol/L Final     Potassium   Date Value Ref Range Status   03/05/2023 4.7 3.5 - 5.1 mmol/L Final     Chloride   Date Value Ref Range Status   03/05/2023 105 95 - 110 mmol/L Final     CO2   Date Value Ref Range Status   03/05/2023 23 23 - 29 mmol/L Final     Glucose   Date Value Ref Range Status   03/05/2023 114 (H) 70 - 110 mg/dL Final     BUN   Date Value Ref Range Status   03/05/2023 29 (H) 8 - 23 mg/dL Final     Creatinine   Date Value Ref Range Status   03/05/2023 6.1 (H) 0.5 - 1.4 mg/dL Final     Calcium   Date Value Ref Range Status   03/05/2023 8.6 (L) 8.7 - 10.5 mg/dL Final     Total Protein   Date Value Ref Range Status   03/05/2023 6.8 6.0 - 8.4 g/dL Final     Albumin   Date Value Ref Range Status   03/05/2023 2.6 (L) 3.5 - 5.2 g/dL Final     Total Bilirubin   Date Value Ref Range Status   03/05/2023 0.5 0.1 - 1.0 mg/dL Final     Comment:     For infants and newborns, interpretation of results should be based  on gestational age, weight and in agreement with clinical  observations.    Premature Infant recommended reference ranges:  Up to 24 hours.............<8.0 mg/dL  Up to 48 hours............<12.0 mg/dL  3-5 days..................<15.0 mg/dL  6-29 days.................<15.0 mg/dL       Alkaline Phosphatase   Date Value Ref Range Status   03/05/2023 34 (L) 55 - 135 U/L Final     AST   Date Value Ref Range Status   03/05/2023 27 10 - 40 U/L Final     ALT   Date Value Ref Range Status   03/05/2023 19 10 - 44 U/L Final     Anion Gap   Date  Value Ref Range Status   03/05/2023 11 8 - 16 mmol/L Final     eGFR   Date Value Ref Range Status   03/05/2023 7 (A) >60 mL/min/1.73 m^2 Final     Microbiology Results (last 7 days)       ** No results found for the last 168 hours. **          Significant Imaging:   ECHO:  The left ventricle is normal in size with concentric remodeling and normal systolic function.  The estimated ejection fraction is 60%.  Grade II left ventricular diastolic dysfunction.  Normal right ventricular size with normal right ventricular systolic function.  Mild left atrial enlargement.  Mild right atrial enlargement.  Mild mitral regurgitation.  The mean diastolic gradient across the mitral valve is 9 mmHg at a heart rate of bpm.  There is mild to moderate mitral stenosis.  Moderate tricuspid regurgitation.  There is pulmonary hypertension.  Normal central venous pressure (3 mmHg).  The estimated PA systolic pressure is 50 mmHg.    US lower back soft-tissue:   On limited assessment, no acute finding in the area of concern.     CXR: Central pulmonary vascular congestion and probable mild pulmonary edema.

## 2023-03-06 VITALS
OXYGEN SATURATION: 98 % | SYSTOLIC BLOOD PRESSURE: 149 MMHG | WEIGHT: 276.69 LBS | DIASTOLIC BLOOD PRESSURE: 65 MMHG | HEIGHT: 65 IN | HEART RATE: 83 BPM | RESPIRATION RATE: 48 BRPM | BODY MASS INDEX: 46.1 KG/M2 | TEMPERATURE: 98 F

## 2023-03-06 LAB
ALBUMIN SERPL BCP-MCNC: 2.7 G/DL (ref 3.5–5.2)
ALP SERPL-CCNC: 32 U/L (ref 55–135)
ALT SERPL W/O P-5'-P-CCNC: 21 U/L (ref 10–44)
ANION GAP SERPL CALC-SCNC: 12 MMOL/L (ref 8–16)
AST SERPL-CCNC: 24 U/L (ref 10–40)
BASOPHILS # BLD AUTO: 0.03 K/UL (ref 0–0.2)
BASOPHILS NFR BLD: 0.4 % (ref 0–1.9)
BILIRUB SERPL-MCNC: 0.5 MG/DL (ref 0.1–1)
BUN SERPL-MCNC: 38 MG/DL (ref 8–23)
CALCIUM SERPL-MCNC: 8.3 MG/DL (ref 8.7–10.5)
CHLORIDE SERPL-SCNC: 108 MMOL/L (ref 95–110)
CO2 SERPL-SCNC: 18 MMOL/L (ref 23–29)
CREAT SERPL-MCNC: 7.6 MG/DL (ref 0.5–1.4)
DIFFERENTIAL METHOD: ABNORMAL
EOSINOPHIL # BLD AUTO: 0.2 K/UL (ref 0–0.5)
EOSINOPHIL NFR BLD: 3.1 % (ref 0–8)
ERYTHROCYTE [DISTWIDTH] IN BLOOD BY AUTOMATED COUNT: 13.7 % (ref 11.5–14.5)
EST. GFR  (NO RACE VARIABLE): 5 ML/MIN/1.73 M^2
GLUCOSE SERPL-MCNC: 120 MG/DL (ref 70–110)
HCT VFR BLD AUTO: 37.8 % (ref 37–48.5)
HGB BLD-MCNC: 11.9 G/DL (ref 12–16)
IMM GRANULOCYTES # BLD AUTO: 0.03 K/UL (ref 0–0.04)
IMM GRANULOCYTES NFR BLD AUTO: 0.4 % (ref 0–0.5)
LYMPHOCYTES # BLD AUTO: 2.8 K/UL (ref 1–4.8)
LYMPHOCYTES NFR BLD: 40.4 % (ref 18–48)
MCH RBC QN AUTO: 30.9 PG (ref 27–31)
MCHC RBC AUTO-ENTMCNC: 31.5 G/DL (ref 32–36)
MCV RBC AUTO: 98 FL (ref 82–98)
MONOCYTES # BLD AUTO: 0.8 K/UL (ref 0.3–1)
MONOCYTES NFR BLD: 11.9 % (ref 4–15)
NEUTROPHILS # BLD AUTO: 3 K/UL (ref 1.8–7.7)
NEUTROPHILS NFR BLD: 43.8 % (ref 38–73)
NRBC BLD-RTO: 0 /100 WBC
PLATELET # BLD AUTO: 155 K/UL (ref 150–450)
PMV BLD AUTO: 11 FL (ref 9.2–12.9)
POTASSIUM SERPL-SCNC: 4.8 MMOL/L (ref 3.5–5.1)
PROT SERPL-MCNC: 6.9 G/DL (ref 6–8.4)
RBC # BLD AUTO: 3.85 M/UL (ref 4–5.4)
SODIUM SERPL-SCNC: 138 MMOL/L (ref 136–145)
WBC # BLD AUTO: 6.8 K/UL (ref 3.9–12.7)

## 2023-03-06 PROCEDURE — 80100014 HC HEMODIALYSIS 1:1

## 2023-03-06 PROCEDURE — 99232 PR SUBSEQUENT HOSPITAL CARE,LEVL II: ICD-10-PCS | Mod: ,,, | Performed by: INTERNAL MEDICINE

## 2023-03-06 PROCEDURE — 85025 COMPLETE CBC W/AUTO DIFF WBC: CPT | Performed by: NURSE PRACTITIONER

## 2023-03-06 PROCEDURE — 80053 COMPREHEN METABOLIC PANEL: CPT | Performed by: NURSE PRACTITIONER

## 2023-03-06 PROCEDURE — 99232 SBSQ HOSP IP/OBS MODERATE 35: CPT | Mod: ,,, | Performed by: INTERNAL MEDICINE

## 2023-03-06 PROCEDURE — 36415 COLL VENOUS BLD VENIPUNCTURE: CPT | Performed by: NURSE PRACTITIONER

## 2023-03-06 PROCEDURE — 25000003 PHARM REV CODE 250: Performed by: INTERNAL MEDICINE

## 2023-03-06 PROCEDURE — 94761 N-INVAS EAR/PLS OXIMETRY MLT: CPT

## 2023-03-06 RX ORDER — METOPROLOL SUCCINATE 50 MG/1
50 TABLET, EXTENDED RELEASE ORAL DAILY
Qty: 30 TABLET | Refills: 11 | Status: ON HOLD
Start: 2023-03-07 | End: 2023-05-20 | Stop reason: SDUPTHER

## 2023-03-06 RX ADMIN — APIXABAN 2.5 MG: 2.5 TABLET, FILM COATED ORAL at 08:03

## 2023-03-06 RX ADMIN — METOPROLOL SUCCINATE 50 MG: 50 TABLET, EXTENDED RELEASE ORAL at 08:03

## 2023-03-06 RX ADMIN — MUPIROCIN: 20 OINTMENT TOPICAL at 08:03

## 2023-03-06 NOTE — PLAN OF CARE
CM sent 3 day packet to [facility] to anticipate pts return after HD today. CM awaiting facility transfer orders to be completed before sending AVS. CM following.    03/06/23 0944   Post-Acute Status   Post-Acute Authorization Placement   Post-Acute Placement Status Referrals Sent

## 2023-03-06 NOTE — PLAN OF CARE
RICHARD spoke to Pratima at [facility] and updated her that I had sent discharge orders. She is pulling them for review and stated that report can be called to Stefan at [phone] and afterwards they will provide transport for the pts. PTs nurse updated and discharge plan closed in [facility].    03/06/23 5197   Post-Acute Status   Post-Acute Authorization Placement   Post-Acute Placement Status Set-up Complete/Auth obtained

## 2023-03-06 NOTE — PROGRESS NOTES
03/06/23 1330   Required for all Hemodialysis Patients   Hepatitis Status negative   Handoff Report   Received From [facility]   Given To Isis LOVE   Vital Signs   Temp 97.7 °F (36.5 °C)   Temp Source Oral   Pulse 75   Heart Rate Source Monitor   Resp 16   SpO2 99 %   Pulse Oximetry Type Continuous   Device (Oxygen Therapy) room air   BP (!) 139/58   MAP (mmHg) 84   BP Location Right leg   BP Method Automatic   Patient Position Lying   Assessments (Pre/Post)   Consent Obtained yes   Date Hepatitis Profile Obtained 03/01/23   Blood Liters Processed (BLP) 58.3   Transport Modality not applicable  (HD RN at bedside)   Level of Consciousness (AVPU) alert   Dialyzer Clearance mildly streaked        Hemodialysis AV Fistula Left upper arm   No placement date or time found.   Present Prior to Hospital Arrival?: Yes  Location: Left upper arm   Needle Size 15ga   Site Assessment Clean;Intact;Dry;No redness;No swelling;Other (Comment)  (Old Healed scar)   Patency Present;Thrill;Bruit   Status Deaccessed   Flows Good   Dressing Intervention First dressing   Dressing Status Clean;Dry;Intact   Site Condition No complications   Dressing Gauze  (Tape)   Post-Hemodialysis Assessment   Rinseback Volume (mL) 250 mL   Blood Volume Processed (Liters) 58.3 L   Dialyzer Clearance Lightly streaked   Duration of Treatment 180 minutes   Additional Fluid Intake (mL) 500 mL   Total UF (mL) 1500 mL   Net Fluid Removal 1000   Patient Response to Treatment Heart Rate went into the 150s and back to the 70/80s three times. No distress noted from pt. Dr Barney and Dr Sparks aware.   Post-Treatment Weight 123.9 kg (273 lb 2.4 oz)   Treatment Weight Change -0.9   Arterial bleeding stop time (min) 8 min   Venous bleeding stop time (min) 8 min   Post-Hemodialysis Comments HD complete/Blood reinfused per protocol

## 2023-03-06 NOTE — DISCHARGE SUMMARY
[facility] Hospital Medicine  Discharge Summary      Patient Name: Isabella Boone  MRN: [id number]  HOWIE: [id number]  Patient Class: IP- Inpatient  Admission Date: 3/1/2023  Hospital Length of Stay: 3 days  Discharge Date and Time:  03/06/2023 9:28 AM  Attending Physician: Joce Barney MD   Discharging Provider: Joce Barney MD  Primary Care Provider: Tin Yousif MD    Primary Care Team: Networked reference to record PCT     HPI:   Isabella Boone is a 77 y.o. female who presents to the ED via EMS with an irregular heartbeat when she was at dialysis before arriving to the ED. History obtained from an independent historian: EMS states that the patient was in the process of completing dialysis when her heart rate went to 160 bpm. The patient denies chest pain, SOB, or any other symptoms at this time. She reports making very little urine as her baseline. The patient has a PMHx of CHF, DM, and HTN. Patient lives at [facility] in [place], [place].     On workup, CXR significant for pulmonary edema. EKG was NSR. No hyperkalemia present and CBC in normal range. Nephrology was consulted with plans to do HD. The patient will be monitored on telemetry.      * No surgery found *      Hospital Course:   Patient admitted from HD center with report of afib RVR during HD. On ED arrival she was NSR but CXR showed pulmonary edema.  During hospital stay patient was evaluated by cardiologist and nephrologist.  Patient continued to receive routine hemodialysis therapy.  Patient had paroxysm of atrial fibrillation/SVT with RVR noted.  Patient was initiated on metoprolol and dose was adjusted.  Arrhythmia has resolved.  Patient has been cleared for discharge back to skilled nursing facility.  Patient to continue close follow-up with primary care physician and cardiologist upon discharge.  During hospital stay patient received before chronic wounds at sacrum and gluteal areas.  Patient worked with PT and OT during  hospital stay and has been accepted for returning back to [facility] nursing [facility] as before.        Goals of Care Treatment Preferences:  Code Status: Full Code      Consults:   Consults (From admission, onward)          Status Ordering Provider     Inpatient consult to Cardiology  Once        Provider:  Berhane Cortez MD    Acknowledged RAMIREZ GORMAN        ECHO:  The left ventricle is normal in size with concentric remodeling and normal systolic function.  The estimated ejection fraction is 60%.  Grade II left ventricular diastolic dysfunction.  Normal right ventricular size with normal right ventricular systolic function.  Mild left atrial enlargement.  Mild right atrial enlargement.  Mild mitral regurgitation.  The mean diastolic gradient across the mitral valve is 9 mmHg at a heart rate of bpm.  There is mild to moderate mitral stenosis.  Moderate tricuspid regurgitation.  There is pulmonary hypertension.  Normal central venous pressure (3 mmHg).  The estimated PA systolic pressure is 50 mmHg.     US lower back soft-tissue:   On limited assessment, no acute finding in the area of concern.      CXR: Central pulmonary vascular congestion and probable mild pulmonary edema.       Pulmonary  Chronic pulmonary edema  HD for volume control, though have not been able to successfully pull much volume off due to unstable hemodynamics  Patient no longer urinates so diuretics not helpful  Fluid restriction added to renal diet  Plan to remove 2-3L via HD today.       Cardiac/Vascular  * Paroxysmal atrial fibrillation  Patient with Paroxysmal (<7 days) atrial fibrillation which is controlled currently with no meds. Patient is currently in sinus rhythm.[id number] Score: 6. HASBLED Score. Anticoagulation - eliquis     Metoprolol dose increased if BP tolerates   eliquis   ECHO results reviewed.   Follow cardiology recommendations.         Renal/  Hyperkalemia  Follow BMP, follow nephrology  recommendations.       ESRD (end stage renal disease)  Consult nephrology  HD MWF - home regimen  Failed attempts x 2 since admission  Scheduled for HD today.         Endocrine  Morbid obesity due to excess calories  Body mass index is 43.93 kg/m². Morbid obesity complicates all aspects of disease management from diagnostic modalities to treatment. Weight loss encouraged and health benefits explained to patient.           Final Active Diagnoses:    Diagnosis Date Noted POA    PRINCIPAL PROBLEM:  Paroxysmal atrial fibrillation [I48.0] 03/01/2023 Yes    Chronic pulmonary edema [J81.1] 03/03/2023 Yes    Hyperkalemia [E87.5] 03/01/2023 Yes    ESRD (end stage renal disease) [N18.6] 09/22/2017 Yes    Morbid obesity due to excess calories [E66.01] 09/22/2017 Yes      Problems Resolved During this Admission:       Discharged Condition: good    Disposition: Home or Self Care    Follow Up:   Follow-up Information       Tin Yousif MD Follow up in 1 week(s).    Specialty: Nephrology  Contact information:  [address]  [facility] [address]  [phone]               Grey Toscano MD Follow up in 2 week(s).    Specialties: Interventional Cardiology, Cardiovascular Disease, Cardiology  Contact information:  [address] [facility] [address]  [address]  [place]OLOGY [facility] [address]  [phone]                           Patient Instructions:      Diet renal     Diet Cardiac     Notify your health care provider if you experience any of the following:  temperature >100.4     Notify your health care provider if you experience any of the following:  persistent nausea and vomiting or diarrhea     Notify your health care provider if you experience any of the following:  severe uncontrolled pain     Notify your health care provider if you experience any of the following:  redness, tenderness, or signs of infection (pain, swelling, redness, odor or green/yellow discharge around incision site)      Notify your health care provider if you experience any of the following:  difficulty breathing or increased cough     Notify your health care provider if you experience any of the following:  severe persistent headache     Notify your health care provider if you experience any of the following:  worsening rash     Notify your health care provider if you experience any of the following:  persistent dizziness, light-headedness, or visual disturbances     Notify your health care provider if you experience any of the following:  increased confusion or weakness     Activity as tolerated   Order Comments: Up with assist, rotate patient q 2 hrs, fall precautions       Significant Diagnostic Studies: Labs:   CMP   Recent Labs   Lab 03/05/23  0605 03/06/23  0502    138   K 4.7 4.8    108   CO2 23 18*   * 120*   BUN 29* 38*   CREATININE 6.1* 7.6*   CALCIUM 8.6* 8.3*   PROT 6.8 6.9   ALBUMIN 2.6* 2.7*   BILITOT 0.5 0.5   ALKPHOS 34* 32*   AST 27 24   ALT 19 21   ANIONGAP 11 12   , CBC   Recent Labs   Lab 03/05/23  0605 03/06/23  0502   WBC 6.14 6.80   HGB 12.0 11.9*   HCT 37.4 37.8   * 155   , INR No results found for: INR, PROTIME and Lipid Panel   Lab Results   Component Value Date    CHOL 127 03/02/2023    HDL 28 (L) 03/02/2023    LDLCALC 85.2 03/02/2023    TRIG 69 03/02/2023    CHOLHDL 22.0 03/02/2023       Pending Diagnostic Studies:       None           Medications:  Reconciled Home Medications:      Medication List        START taking these medications      apixaban 2.5 mg Tab  Commonly known as: ELIQUIS  Take 1 tablet (2.5 mg total) by mouth 2 (two) times daily.     metoprolol succinate 50 MG 24 hr tablet  Commonly known as: TOPROL-XL  Take 1 tablet (50 mg total) by mouth once daily.  Start taking on: March 7, 2023            CONTINUE taking these medications      ascorbic acid (vitamin C) 500 MG tablet  Commonly known as: VITAMIN C  Take 500 mg by mouth once daily. Give 2 tablets daily      B COMPLEX-C-FOLIC ACID-ZN ORAL  Take by mouth.     calcium carbonate 200 mg calcium (500 mg) chewable tablet  Commonly known as: TUMS  Take 1 tablet by mouth once daily. Take 2 tablets with meals and 3 tablets at night     gabapentin 100 MG capsule  Commonly known as: NEURONTIN  Take 100 mg by mouth every evening.     HYDROcodone-acetaminophen 5-325 mg per tablet  Commonly known as: NORCO  Take 1 tablet by mouth every 6 (six) hours as needed.     ipratropium 17 mcg/actuation inhaler  Commonly known as: ATROVENT HFA  Inhale 1 puff into the lungs every 6 (six) hours as needed for Wheezing. Rescue     loratadine 10 mg tablet  Commonly known as: CLARITIN  Take 10 mg by mouth once daily.     megestroL 400 mg/10 mL (40 mg/mL) Susp  Commonly known as: MEGACE  Take 400 mg by mouth 2 (two) times daily.     midodrine 5 MG Tab  Commonly known as: PROAMATINE  Take 5 mg by mouth every 12 (twelve) hours.     oxyCODONE 5 mg Cap  Commonly known as: OXY-IR  Take 5 mg by mouth every 4 (four) hours as needed for Pain. Give 0.5 tablet by mouth every 4hrs as needed for pain              Indwelling Lines/Drains at time of discharge:   Lines/Drains/Airways       None                   Time spent on the discharge of patient: 35 minutes         Joce Barney MD  Department of Hospital Medicine  [facility]

## 2023-03-06 NOTE — EICU
Intervention Initiated From:  COR / EICU    Yaritza intervened regarding:  Rounding (Video assessment)    Nurse Notified:  Yes    Doctor Notified:  No    Comments: EICU rounding done. Nurse at bedside, pt on dialysis.  Chart and meds reviewed.  VS stable.

## 2023-03-06 NOTE — PLAN OF CARE
The pt is cleared for discharge back to [facility] and they are going to provide transport.    03/06/23 1400   Final Note   Assessment Type Final Discharge Note   Anticipated Discharge Disposition SNF   What phone number can be called within the next 1-3 days to see how you are doing after discharge? [phone]   Post-Acute Status   Post-Acute Authorization Placement   Post-Acute Placement Status Set-up Complete/Auth obtained   Patient choice form signed by patient/caregiver List with quality metrics by geographic area provided   Discharge Delays None known at this time

## 2023-03-06 NOTE — PLAN OF CARE
CM sent discharge orders and AVS to [facility] for review. Pt is accepted. Awaiting who to call report to. RICHARD following.    03/06/23 1339   Post-Acute Status   Post-Acute Authorization Placement   Post-Acute Placement Status Pending post-acute provider review/more information requested

## 2023-03-06 NOTE — EICU
Intervention Initiated From:  COR / EICU    Yaritza intervened regarding:  Rounding (Video assessment)    Nurse Notified:  No    Doctor Notified:  No    Comments: Video rounding completed. No infusions at present. HR 74. Respirations even and unlabored. Alert and oriented. Family at bedside. NAD.

## 2023-03-06 NOTE — DISCHARGE INSTRUCTIONS
[facility]        Admit to: [facility]    Diagnoses:   Active Hospital Problems    Diagnosis  POA    *Paroxysmal atrial fibrillation [I48.0]  Yes    Chronic pulmonary edema [J81.1]  Yes    Hyperkalemia [E87.5]  Yes    ESRD (end stage renal disease) [N18.6]  Yes    Morbid obesity due to excess calories [E66.01]  Yes      Resolved Hospital Problems   No resolved problems to display.     Allergies:   Review of patient's allergies indicates:   Allergen Reactions    Ace inhibitors Other (See Comments)    Betadine [povidone-iodine] Rash    Dye Other (See Comments)    Gentamicin Rash       Code Status: Full    Vitals: Routine       Diet: cardiac diet and renal diet    Activity: Up with assist, rotate patient q 2 hrs, Fall precautions.     Nursing Precautions: Aspiration , Fall, Seizure, and Pressure ulcer prevention    Bed/Surface: Low Air Loss    Consults: PT to evaluate and treat- 5 times a week and OT to evaluate and treat- 5 times a week    Oxygen: Oxygen use prn.    Dialysis: Patient is on dialysis.     Labs: CBC and CMP weekly.      IV Access: None     Medications: Discontinue all previous medication orders, if any. See new list below.  Current Discharge Medication List        START taking these medications    Details   apixaban (ELIQUIS) 2.5 mg Tab Take 1 tablet (2.5 mg total) by mouth 2 (two) times daily.  Qty: 60 tablet, Refills: 0      metoprolol succinate (TOPROL-XL) 50 MG 24 hr tablet Take 1 tablet (50 mg total) by mouth once daily.  Qty: 30 tablet, Refills: 11    Comments: .           CONTINUE these medications which have NOT CHANGED    Details   ascorbic acid, vitamin C, (VITAMIN C) 500 MG tablet Take 500 mg by mouth once daily. Give 2 tablets daily      calcium carbonate (TUMS) 200 mg calcium (500 mg) chewable tablet Take 1 tablet by mouth once daily. Take 2 tablets with meals and 3 tablets at night      gabapentin  (NEURONTIN) 100 MG capsule Take 100 mg by mouth every evening.      loratadine (CLARITIN) 10 mg tablet Take 10 mg by mouth once daily.      megestroL (MEGACE) 400 mg/10 mL (40 mg/mL) Susp Take 400 mg by mouth 2 (two) times daily.      midodrine (PROAMATINE) 5 MG Tab Take 5 mg by mouth every 12 (twelve) hours.      VIT BCOMP,C/FOLIC ACID/ZINC (B COMPLEX-C-FOLIC ACID-ZN ORAL) Take by mouth.      hydrocodone-acetaminophen 5-325mg (NORCO) 5-325 mg per tablet Take 1 tablet by mouth every 6 (six) hours as needed.  Qty: 15 tablet, Refills: 0      ipratropium (ATROVENT HFA) 17 mcg/actuation inhaler Inhale 1 puff into the lungs every 6 (six) hours as needed for Wheezing. Rescue      oxyCODONE (OXY-IR) 5 mg Cap Take 5 mg by mouth every 4 (four) hours as needed for Pain. Give 0.5 tablet by mouth every 4hrs as needed for pain           Follow up:    Follow-up Information       Tin Yousif MD Follow up in 1 week(s).    Specialty: Nephrology  Contact information:  [address] [facility]  [place] [address]  [phone]               Grey Toscano MD Follow up in 2 week(s).    Specialties: Interventional Cardiology, Cardiovascular Disease, Cardiology  Contact information:  [address] [facility] [facility]  [address]  [facility]  [facility] [address]  [phone]               [facility] Follow up.    Specialties: SNF Agency, Nursing Home Agency, Physical Therapy, Occupational Therapy, SNF Agency  Why: alf, Nursing Home  Contact information:  [address] [place] [address]  [phone]                               Immunizations Administered as of 3/6/2023       Name Date Dose VIS Date Route Exp Date    COVID-19, MRNA, LN-S, PF (Moderna) 8/25/2021 0.5 mL -- -- --    Lot: [id number]     External: Auto Reconciled From Outside Source     COVID-19, MRNA, LN-S, PF (Moderna) 4/23/2021 0.5 mL -- -- --    Lot: [id number]     External: Auto Reconciled From Outside Source     COVID-19, MRNA, LN-S,  PF (Moderna) 3/25/2021 0.5 mL -- -- --    Lot: [id number]     External: Auto Reconciled From Outside Source               Joce Barney MD

## 2023-03-06 NOTE — PT/OT/SLP PROGRESS
Occupational Therapy      Patient Name:  Isabella Boone   MRN:  [id number]    Patient not seen today secondary to Dialysis (OT evaluation attempted at 10:41 - dialysis.). Will follow-up 3/7/2023.    3/6/2023

## 2023-03-06 NOTE — PROGRESS NOTES
[facility]  Department of Cardiology  Progress Note    PATIENT NAME: Isabella Boone  MRN: [id number]  TODAY'S DATE: 03/06/2023  ADMIT DATE: 3/1/2023    SUBJECTIVE     PRINCIPLE PROBLEM: Paroxysmal atrial fibrillation    INTERVAL HISTORY:    3/6/2023  She is feeling much better today.  No further PSVT on the telemetry unit tolerating Toprol XL.  She is to start dialysis in the next few minutes.    3/5/23  She is feeling much better today.  Heart rate 76 blood pressure 124/76.  She developed PSVT yesterday during dialysis she was given 2.5 of IV metoprolol with conversion to normal sinus rhythm.  She denied any chest pain during the episode.    3/4/2023  She is feeling okay she denies any chest pains any shortness of breath.  She is set up for dialysis today.  Will increase the doses of metoprolol.  The echocardiogram revealed diastolic dysfunction with mild to moderate degree of mitral stenosis.  The study is a technically difficult study.    3/3/2023  Ms. Boone is a 77-year-old female on chronic hemodialysis for 20 years.  She was having dialysis and went into a rapid heart rate.  Today she was having dialysis and developed an SVT rate of 154.  Her systolic pressure was in the 90s.  Some fluid was return to her increased to 110 subsequently 2.5 mg of IV metoprolol was given.  Dialysis was discontinue she converted into normal sinus rhythm.  During the episode she was describing some chest tightness and palpitations.  She denied any shortness of breath      Review of patient's allergies indicates:   Allergen Reactions    Ace inhibitors Other (See Comments)    Betadine [povidone-iodine] Rash    Dye Other (See Comments)    Gentamicin Rash       REVIEW OF SYSTEMS  CARDIOVASCULAR:  No palpitations no chest pain  RESPIRATORY: No recent fever, cough chills, SOB or congestion  : No blood in the urine  GI: No Nausea, vomiting, constipation, diarrhea, blood, or reflux.  MUSCULOSKELETAL: No myalgias  NEURO: No  lightheadedness or dizziness  EYES: No Double vision, blurry, vision or headache     OBJECTIVE     VITAL SIGNS (Most Recent)  Temp: 98.7 °F (37.1 °C) (03/06/23 0945)  Pulse: 75 (03/06/23 1045)  Resp: 15 (03/06/23 1045)  BP: 127/61 (03/06/23 1045)  SpO2: 100 % (03/06/23 1045)    VENTILATION STATUS  Resp: 15 (03/06/23 1045)  SpO2: 100 % (03/06/23 1045)       I & O (Last 24H):  Intake/Output Summary (Last 24 hours) at 3/6/2023 1100  Last data filed at 3/6/2023 0415  Gross per 24 hour   Intake 10 ml   Output --   Net 10 ml         WEIGHTS  Wt Readings from Last 1 Encounters:   03/06/23 0514 125.5 kg (276 lb 10.8 oz)   03/05/23 0541 125.8 kg (277 lb 5.4 oz)   03/03/23 0930 119.7 kg (264 lb)   03/01/23 2008 119.9 kg (264 lb 5.3 oz)   03/01/23 1606 123.4 kg (272 lb)       PHYSICAL EXAM  CONSTITUTIONAL: Well built, well nourished in no apparent distress  NECK: no carotid bruit, no JVD  LUNGS: CTA  CHEST WALL: no tenderness  HEART: regular rate and rhythm,   ABDOMEN: soft, non-tender; bowel sounds normal; no masses,  no organomegaly  EXTREMITIES: Extremities normal, no edema  NEURO: AAO X 3    SCHEDULED MEDS:   apixaban  2.5 mg Oral BID    metoprolol succinate  50 mg Oral Daily    mupirocin   Nasal BID    senna-docusate 8.6-50 mg  1 tablet Oral BID    sodium chloride 0.9%  250 mL Intravenous Once       CONTINUOUS INFUSIONS:    PRN MEDS:acetaminophen, acetaminophen, dextrose 10%, dextrose 10%, dextrose, dextrose, glucagon (human recombinant), HYDROcodone-acetaminophen, HYDROcodone-acetaminophen, melatonin, naloxone, ondansetron, sodium chloride 0.9%    LABS AND DIAGNOSTICS     CBC LAST 3 DAYS  Recent Labs   Lab 03/04/23  0449 03/05/23  0605 03/06/23  0502   WBC 6.50 6.14 6.80   RBC 3.66* 3.89* 3.85*   HGB 11.3* 12.0 11.9*   HCT 35.4* 37.4 37.8   MCV 97 96 98   MCH 30.9 30.8 30.9   MCHC 31.9* 32.1 31.5*   RDW 14.0 13.7 13.7    148* 155   MPV 10.7 11.1 11.0   GRAN 46.0  3.0 47.7  2.9 43.8  3.0   LYMPH 38.2  2.5 36.2   2.2 40.4  2.8   MONO 12.3  0.8 11.6  0.7 11.9  0.8   BASO 0.02 0.03 0.03   NRBC 0 0 0         COAGULATION LAST 3 DAYS  No results for input(s): LABPT, INR, APTT in the last 168 hours.    CHEMISTRY LAST 3 DAYS  Recent Labs   Lab 03/02/23  0435 03/03/23  0432 03/04/23  0449 03/05/23  0605 03/06/23  0502    141 143 139 138   K 4.4 4.9 5.0 4.7 4.8    104 105 105 108   CO2 24 24 24 23 18*   ANIONGAP 12 13 14 11 12   BUN 30* 43* 37* 29* 38*   CREATININE 5.3* 6.9* 7.0* 6.1* 7.6*   * 158* 127* 114* 120*   CALCIUM 7.8* 7.8* 8.0* 8.6* 8.3*   MG 1.8 1.9 1.9  --   --    ALBUMIN 2.5* 2.6* 2.6* 2.6* 2.7*   PROT 6.7 6.8 6.9 6.8 6.9   ALKPHOS 37* 35* 35* 34* 32*   ALT 13 11 13 19 21   AST 17 18 19 27 24   BILITOT 0.5 0.5 0.5 0.5 0.5         CARDIAC PROFILE LAST 3 DAYS  Recent Labs   Lab 03/01/23  1642 03/01/23  1833   BNP  --  314*   TROPONINI 0.074*  --          ENDOCRINE LAST 3 DAYS  Recent Labs   Lab 03/01/23  1833   TSH 2.225         LAST ARTERIAL BLOOD GAS  ABG  No results for input(s): PH, PO2, PCO2, HCO3, BE in the last 168 hours.    LAST 7 DAYS MICROBIOLOGY   Microbiology Results (last 7 days)       ** No results found for the last 168 hours. **            MOST RECENT IMAGING  Echo  · The left ventricle is normal in size with concentric remodeling and   normal systolic function.  · The estimated ejection fraction is 60%.  · Grade II left ventricular diastolic dysfunction.  · Normal right ventricular size with normal right ventricular systolic   function.  · Mild left atrial enlargement.  · Mild right atrial enlargement.  · Mild mitral regurgitation.  · The mean diastolic gradient across the mitral valve is 9 mmHg at a heart   rate of bpm.  · There is mild to moderate mitral stenosis.  · Moderate tricuspid regurgitation.  · There is pulmonary hypertension.  · Normal central venous pressure (3 mmHg).  · The estimated PA systolic pressure is 50 mmHg.         LASTECHO  No results found for this or any  previous visit.      CURRENT/PREVIOUS VISIT EKG  Results for orders placed or performed during the hospital encounter of 03/01/23   EKG 12-lead    Collection Time: 03/01/23  9:00 PM    Narrative    Test Reason : R00.0,    Vent. Rate : 097 BPM     Atrial Rate : 097 BPM     P-R Int : 178 ms          QRS Dur : 124 ms      QT Int : 396 ms       P-R-T Axes : 073 260 048 degrees     QTc Int : 502 ms    Sinus rhythm with marked sinus arrythmia  Right bundle branch block  Abnormal ECG  When compared with ECG of 01-MAR-2023 16:17,  No significant change was found    Referred By: [email or web address]   SELF           Confirmed By:        ASSESSMENT/PLAN:     Active Hospital Problems    Diagnosis    *Paroxysmal atrial fibrillation    Chronic pulmonary edema    Hyperkalemia    ESRD (end stage renal disease)    Morbid obesity due to excess calories       ASSESSMENT & PLAN:   PSVT versus atrial flutter that occurs during dialysis.  She responded well to low doses of metoprolol.  Will increase the oral metoprolol.  EP recommended to try metoprolol at higher doses    End-stage renal disease on hemodialysis    Diastolic dysfunction with normal left ventricular function    Mild to moderate mitral stenosis.  Will attempt to decrease her heart rate and see how she does.      RECOMMENDATIONS:  On Higher doses of metoprolol.  Renal doses of anticoagulation for potential atrial flutter  If she develops tachycardia during dialysis attempt the 2.5 mg of metoprolol IV        Grey Toscano MD  Department of Cardiology  Date of Service: 03/06/2023  10:15 AM

## 2023-03-06 NOTE — PROGRESS NOTES
INPATIENT NEPHROLOGY PROGRESS NOTE  [facility] NEPHROLOGY    Isabella Boone  03/06/2023    Reason for consultation:  esrd    Chief Complaint:   Chief Complaint   Patient presents with    Irregular Heart Beat     Pt went into SVT while doing her dialysis treatment.  Pt tx to ed via ems.  Ems advised pt converted from SVT during transport with no interventions.       History of Present Illness:    Per H and P     Isabella Boone is a 77 y.o. female who presents to the ED via EMS with an irregular heartbeat when she was at dialysis before arriving to the ED. History obtained from an independent historian: EMS states that the patient was in the process of completing dialysis when her heart rate went to 160 bpm. The patient denies chest pain, SOB, or any other symptoms at this time. She reports making very little urine as her baseline. The patient has a PMHx of CHF, DM, and HTN. Patient lives at [facility] in [place], [place].      On workup, CXR significant for pulmonary edema. EKG was NSR. No hyperkalemia present and CBC in normal range. Nephrology was consulted with plans to do HD. The patient will be monitored on telemetry    3/2  HD stopped earlier due to AFIB rvr.   No nausea, chest pain, sob, fever, urinary or bowel complaint, new neurologic symptoms, new joint pain  3/3  hd stopped again due to AFIB rvr.  No chest pain or sob at this time  3/4  Lab results reviewed, pressures stable.  No complaints.  HD later today.  3/5 VSS . Had to terminate HD yesterday after 2h 2/2 AF/SVT. 1L UF achieved.  3/6 seen on HD- tolerating- HR shot up to 150 temporarily but came back down- aiming for about 1 UF- BP has been stable throughout    Plan of Care:    ESRD  --continue HD MWF    Afib  --management per cardiology    Hypertension/Edema  --uf with hd  --fluid restrict 1.5L/day  --low salt diet    Anemia of CKD  --erythropoiesis stimulating agent with renal replacement therapy if hg goes below 10    SHPT  --renal diet    Thank you  for allowing us to participate in this patient's care. We will continue to follow.    Vital Signs:  Temp Readings from Last 3 Encounters:   03/06/23 98.7 °F (37.1 °C) (Oral)   09/27/17 97 °F (36.1 °C)       Pulse Readings from Last 3 Encounters:   03/06/23 71   09/27/17 70       BP Readings from Last 3 Encounters:   03/06/23 124/60   09/27/17 (!) 116/59       Weight:  Wt Readings from Last 3 Encounters:   03/06/23 125.5 kg (276 lb 10.8 oz)   09/22/17 123.4 kg (272 lb)       Medications:  No current facility-administered medications on file prior to encounter.     Current Outpatient Medications on File Prior to Encounter   Medication Sig Dispense Refill    ascorbic acid, vitamin C, (VITAMIN C) 500 MG tablet Take 500 mg by mouth once daily. Give 2 tablets daily      calcium carbonate (TUMS) 200 mg calcium (500 mg) chewable tablet Take 1 tablet by mouth once daily. Take 2 tablets with meals and 3 tablets at night      gabapentin (NEURONTIN) 100 MG capsule Take 100 mg by mouth every evening.      loratadine (CLARITIN) 10 mg tablet Take 10 mg by mouth once daily.      megestroL (MEGACE) 400 mg/10 mL (40 mg/mL) Susp Take 400 mg by mouth 2 (two) times daily.      midodrine (PROAMATINE) 5 MG Tab Take 5 mg by mouth every 12 (twelve) hours.      VIT BCOMP,C/FOLIC ACID/ZINC (B COMPLEX-C-FOLIC ACID-ZN ORAL) Take by mouth.      hydrocodone-acetaminophen 5-325mg (NORCO) 5-325 mg per tablet Take 1 tablet by mouth every 6 (six) hours as needed. 15 tablet 0    ipratropium (ATROVENT HFA) 17 mcg/actuation inhaler Inhale 1 puff into the lungs every 6 (six) hours as needed for Wheezing. Rescue      oxyCODONE (OXY-IR) 5 mg Cap Take 5 mg by mouth every 4 (four) hours as needed for Pain. Give 0.5 tablet by mouth every 4hrs as needed for pain       Scheduled Meds:   apixaban  2.5 mg Oral BID    metoprolol succinate  50 mg Oral Daily    mupirocin   Nasal BID    senna-docusate 8.6-50 mg  1 tablet Oral BID    sodium chloride 0.9%  250 mL  "Intravenous Once     Continuous Infusions:  PRN Meds:.acetaminophen, acetaminophen, dextrose 10%, dextrose 10%, dextrose, dextrose, glucagon (human recombinant), HYDROcodone-acetaminophen, HYDROcodone-acetaminophen, melatonin, naloxone, ondansetron, sodium chloride 0.9%    Review of Systems:  Neg    Physical Exam:    /60 (BP Location: Right leg, Patient Position: Lying)   Pulse 71   Temp 98.7 °F (37.1 °C) (Oral)   Resp 18   Ht 5' 5" (1.651 m)   Wt 125.5 kg (276 lb 10.8 oz)   LMP  (LMP Unknown)   SpO2 100%   Breastfeeding No   BMI 46.04 kg/m²     General Appearance:    Alert, cooperative, no distress, appears stated age   Head:    Normocephalic, without obvious abnormality, atraumatic   Eyes:    PER, conjunctiva/corneas clear, EOM's intact in both eyes        Throat:   Lips, mucosa, and tongue normal; teeth and gums normal   Back:     Symmetric, no curvature, ROM normal, no CVA tenderness   Lungs:     Clear to auscultation bilaterally, respirations unlabored   Chest wall:    No tenderness or deformity   Heart:    Regular rate and rhythm, S1 and S2 normal, no murmur, rub   or gallop   Abdomen:     Soft, non-tender, bowel sounds active all four quadrants,     no masses, no organomegaly   Extremities:   Extremities normal, atraumatic, no cyanosis.  2 plus edema   Pulses:   2+ and symmetric all extremities   MSK:   No joint or muscle swelling, tenderness or deformity   Skin:   Skin color, texture, turgor normal, no rashes or lesions   Neurologic:   CNII-XII intact, normal strength and sensation       Throughout.  No flap     Results:  Lab Results   Component Value Date     03/06/2023    K 4.8 03/06/2023     03/06/2023    CO2 18 (L) 03/06/2023    BUN 38 (H) 03/06/2023    CREATININE 7.6 (H) 03/06/2023    CALCIUM 8.3 (L) 03/06/2023    ANIONGAP 12 03/06/2023    ESTGFRAFRICA 5 (A) 09/27/2017    EGFRNONAA 4 (A) 09/27/2017       Lab Results   Component Value Date    CALCIUM 8.3 (L) 03/06/2023    PHOS " 5.0 (H) 03/04/2023       Recent Labs   Lab 03/06/23  0502   WBC 6.80   RBC 3.85*   HGB 11.9*   HCT 37.8      MCV 98   MCH 30.9   MCHC 31.5*          Imaging Results              X-Ray Chest AP Portable (Final result)  Result time 03/01/23 16:43:22      Final result by Candy Diaz MD (03/01/23 16:43:22)                   Impression:      Central pulmonary vascular congestion and probable mild pulmonary edema.      Electronically signed by: Candy Diaz  Date:    03/01/2023  Time:    16:43               Narrative:    EXAMINATION:  XR CHEST AP PORTABLE    CLINICAL HISTORY:  Tachycardia, unspecified    TECHNIQUE:  Single view of the chest was obtained.    COMPARISON:  None    FINDINGS:  Low lung volumes accentuating the cardiomediastinal contour.  Central pulmonary vascular congestion.  Atherosclerotic calcification of the aortic arch.  Perihilar and basilar predominant central opacities.  No large pleural effusion or pneumothorax.                                    Patient care was time spent personally by me on the following activities: > 35 min  Obtaining a history  Examination of patient.  Providing medical care at the patients bedside.  Developing a treatment plan with patient or surrogate and bedside caregivers  Ordering and reviewing laboratory studies, radiographic studies, pulse oximetry.  Ordering and performing treatments and interventions.  Evaluation of patient's response to treatment.  Discussions with consultants while on the unit and immediately available to the patient.  Re-evaluation of the patient's condition.  Documentation in the medical record.     Wayne Sparks MD    Nephrology  [facility]  [phone]

## 2023-03-06 NOTE — CARE UPDATE
03/05/23 1928   Patient Assessment/Suction   Level of Consciousness (AVPU) alert   Respiratory Effort Unlabored   PRE-TX-O2   Device (Oxygen Therapy) room air   SpO2 100 %   Pulse Oximetry Type Continuous   $ Pulse Oximetry - Multiple Charge Pulse Oximetry - Multiple   Pulse 73   Resp (!) 22

## 2023-03-06 NOTE — PT/OT/SLP PROGRESS
Physical Therapy      Patient Name:  Isabella Boone   MRN:  [id number]    PT eval attempted- dialysis..

## 2023-03-06 NOTE — CARE UPDATE
03/06/23 0658   PRE-TX-O2   Device (Oxygen Therapy) room air   SpO2 98 %   Pulse Oximetry Type Continuous   $ Pulse Oximetry - Multiple Charge Pulse Oximetry - Multiple   Pulse 77   Resp 14

## 2023-03-14 ENCOUNTER — TELEPHONE (OUTPATIENT)
Dept: CARDIOLOGY | Facility: CLINIC | Age: 78
End: 2023-03-14
Payer: MEDICARE

## 2023-03-14 NOTE — TELEPHONE ENCOUNTER
Spoke with Rachel and offered her tomorrow with [facility] she couldn't due to patient having dialysis mwf. Pt was scheduled 23rd at 920am with Lauren.

## 2023-03-14 NOTE — TELEPHONE ENCOUNTER
----- Message from Jeevan Gaspar sent at 3/14/2023  9:28 AM CDT -----  Regarding: Appointment  Contact: Rachel Zimmer with [facility]  Type:  Sooner Apoointment Request    Caller is requesting a sooner appointment.  Caller declined first available appointment listed below.  Caller will not accept being placed on the waitlist and is requesting a message be sent to doctor.  Name of Caller:Rachel Zimmer with [facility]  When is the first available appointment?05/01/23  Symptoms:Hospital F/U with Referral  Would the patient rather a call back or a response via [facility]? call  Best Call Back Number:[phone]  Additional Information: Rachel with [facility] called regarding a sooner appointment due to hospital f/u.

## 2023-03-22 ENCOUNTER — TELEPHONE (OUTPATIENT)
Dept: CARDIOLOGY | Facility: CLINIC | Age: 78
End: 2023-03-22
Payer: MEDICARE

## 2023-03-22 NOTE — TELEPHONE ENCOUNTER
Lm that I need to reschedule this appointment. Lauren needs to work in the hospital since we have a doctor out with COVID

## 2023-04-06 ENCOUNTER — OFFICE VISIT (OUTPATIENT)
Dept: CARDIOLOGY | Facility: CLINIC | Age: 78
End: 2023-04-06
Payer: MEDICARE

## 2023-04-06 VITALS
SYSTOLIC BLOOD PRESSURE: 130 MMHG | HEIGHT: 65 IN | OXYGEN SATURATION: 93 % | WEIGHT: 262 LBS | BODY MASS INDEX: 43.65 KG/M2 | DIASTOLIC BLOOD PRESSURE: 76 MMHG | HEART RATE: 60 BPM

## 2023-04-06 DIAGNOSIS — N18.6 ESRD (END STAGE RENAL DISEASE): Primary | ICD-10-CM

## 2023-04-06 DIAGNOSIS — E66.01 MORBID OBESITY DUE TO EXCESS CALORIES: ICD-10-CM

## 2023-04-06 DIAGNOSIS — I48.0 PAROXYSMAL ATRIAL FIBRILLATION: ICD-10-CM

## 2023-04-06 DIAGNOSIS — I47.10 SVT (SUPRAVENTRICULAR TACHYCARDIA): ICD-10-CM

## 2023-04-06 DIAGNOSIS — J81.1 CHRONIC PULMONARY EDEMA: ICD-10-CM

## 2023-04-06 PROCEDURE — 99214 PR OFFICE/OUTPT VISIT, EST, LEVL IV, 30-39 MIN: ICD-10-PCS | Mod: S$PBB,,,

## 2023-04-06 PROCEDURE — 99999 PR PBB SHADOW E&M-EST. PATIENT-LVL III: CPT | Mod: PBBFAC,,,

## 2023-04-06 PROCEDURE — 99999 PR PBB SHADOW E&M-EST. PATIENT-LVL III: ICD-10-PCS | Mod: PBBFAC,,,

## 2023-04-06 PROCEDURE — 99214 OFFICE O/P EST MOD 30 MIN: CPT | Mod: S$PBB,,,

## 2023-04-06 PROCEDURE — 99213 OFFICE O/P EST LOW 20 MIN: CPT | Mod: PBBFAC,PN

## 2023-04-06 RX ORDER — CETIRIZINE HYDROCHLORIDE 10 MG/1
5 TABLET ORAL
COMMUNITY

## 2023-04-06 NOTE — ASSESSMENT & PLAN NOTE
BMI 43.6.  Recommend low-sodium heart healthy diet.  Recommend calorie restriction.  Recommend weight loss.

## 2023-04-06 NOTE — ASSESSMENT & PLAN NOTE
Patient had episodes of SVT during recent hospitalization.  Patient was titrated to 50 milligrams of metoprolol succinate.  Patient has not had any further issues with hemodialysis since discharge.  Continue current metoprolol dose.  Blood pressure 130/76 today in office.  Continue current medication regimen.

## 2023-04-06 NOTE — ASSESSMENT & PLAN NOTE
Patient has chronic pulmonary edema.  Managed by Nephrology with hemodialysis.  Recommend 1.5 liter fluid restriction and 2 gram low-sodium diet.  Discussed fluid and salt restriction with patient today in office.  Respiratory rate normal and nonlabored.  1+ nonpitting edema in bilateral lower extremities.    Patient no longer voids.  Diuretics are not likely to be effective.  We will defer to Nephrology for management of fluid overload.

## 2023-04-06 NOTE — PROGRESS NOTES
Subjective:    Patient ID:  Isabella Boone is a 77 y.o. female patient here for evaluation Hospital Follow Up      History of Present Illness:     Patient is here for hospital follow-up appointment.  She was staying at [facility].  She was recently admitted to the hospital for atrial fibrillation with rapid ventricular rate during dialysis.  By the time she arrived to the ED she was in normal sinus rhythm.  However, during hospital admission she was noted to go into SVT during dialysis.  Cardiology was consulted and Dr. Talbot at [facility] was also contacted for recommendations for SVT.  Patient was started on metoprolol and was titrated up to 50 milligrams daily.  She tolerated this dose well and was discharged on metoprolol 50 milligrams daily.    Today in office. Patient denies chest pain, shortness of breath at rest, palpitations, lightheadedness, dizziness, jaw neck or arm pain, edema or bleeding.  Nurse is present during examination as well.  Nurse and patient deny any issues with dialysis since discharge from hospital.  No acute complaints or symptoms.  She was tolerating the metoprolol well.  Blood pressure today in office is 130/76.  Heart rate 60.  She is compliant with taking her medications as prescribed.  She reports eating low-sodium diet.  She was not strict with fluid intake.             3/6/23 Discharge Summary  HPI:   Isabella Boone is a 77 y.o. female who presents to the ED via EMS with an irregular heartbeat when she was at dialysis before arriving to the ED. History obtained from an independent historian: EMS states that the patient was in the process of completing dialysis when her heart rate went to 160 bpm. The patient denies chest pain, SOB, or any other symptoms at this time. She reports making very little urine as her baseline. The patient has a PMHx of CHF, DM, and HTN. Patient lives at [facility] in [place], [place].      On workup, CXR significant for pulmonary edema. EKG was  NSR. No hyperkalemia present and CBC in normal range. Nephrology was consulted with plans to do HD. The patient will be monitored on telemetry.        * No surgery found *       Hospital Course:   Patient admitted from HD center with report of afib RVR during HD. On ED arrival she was NSR but CXR showed pulmonary edema.  During hospital stay patient was evaluated by cardiologist and nephrologist.  Patient continued to receive routine hemodialysis therapy.  Patient had paroxysm of atrial fibrillation/SVT with RVR noted.  Patient was initiated on metoprolol and dose was adjusted.  Arrhythmia has resolved.  Patient has been cleared for discharge back to skilled nursing [facility].  Patient to continue close follow-up with primary care physician and cardiologist upon discharge.  During hospital stay patient received before chronic wounds at sacrum and gluteal areas.  Patient worked with PT and OT during hospital stay and has been accepted for returning back to [facility] as before.         Goals of Care Treatment Preferences:  Code Status: Full Code      Pulmonary  Chronic pulmonary edema  HD for volume control, though have not been able to successfully pull much volume off due to unstable hemodynamics  Patient no longer urinates so diuretics not helpful  Fluid restriction added to renal diet  Plan to remove 2-3L via HD today.         Cardiac/Vascular  * Paroxysmal atrial fibrillation  Patient with Paroxysmal (<7 days) atrial fibrillation which is controlled currently with no meds. Patient is currently in sinus rhythm.[id number] Score: 6. HASBLED Score. Anticoagulation - eliquis      Metoprolol dose increased if BP tolerates   eliquis   ECHO results reviewed.   Follow cardiology recommendations.            Renal/  Hyperkalemia  Follow BMP, follow nephrology recommendations.         ESRD (end stage renal disease)  Consult nephrology  HD MWF - home regimen  Failed attempts x 2 since  admission  Scheduled for HD today.            Endocrine  Morbid obesity due to excess calories  Body mass index is 43.93 kg/m². Morbid obesity complicates all aspects of disease management from diagnostic modalities to treatment. Weight loss encouraged and health benefits explained to patient.            START taking these medications       apixaban 2.5 mg Tab  Commonly known as: ELIQUIS  Take 1 tablet (2.5 mg total) by mouth 2 (two) times daily.      metoprolol succinate 50 MG 24 hr tablet  Commonly known as: TOPROL-XL  Take 1 tablet (50 mg total) by mouth once daily.  Start taking on: March 7, 2023                CONTINUE taking these medications       ascorbic acid (vitamin C) 500 MG tablet  Commonly known as: VITAMIN C  Take 500 mg by mouth once daily. Give 2 tablets daily      B COMPLEX-C-FOLIC ACID-ZN ORAL  Take by mouth.      calcium carbonate 200 mg calcium (500 mg) chewable tablet  Commonly known as: TUMS  Take 1 tablet by mouth once daily. Take 2 tablets with meals and 3 tablets at night      gabapentin 100 MG capsule  Commonly known as: NEURONTIN  Take 100 mg by mouth every evening.      HYDROcodone-acetaminophen 5-325 mg per tablet  Commonly known as: NORCO  Take 1 tablet by mouth every 6 (six) hours as needed.      ipratropium 17 mcg/actuation inhaler  Commonly known as: ATROVENT HFA  Inhale 1 puff into the lungs every 6 (six) hours as needed for Wheezing. Rescue      loratadine 10 mg tablet  Commonly known as: CLARITIN  Take 10 mg by mouth once daily.      megestroL 400 mg/10 mL (40 mg/mL) Susp  Commonly known as: MEGACE  Take 400 mg by mouth 2 (two) times daily.      midodrine 5 MG Tab  Commonly known as: PROAMATINE  Take 5 mg by mouth every 12 (twelve) hours.      oxyCODONE 5 mg Cap  Commonly known as: OXY-IR  Take 5 mg by mouth every 4 (four) hours as needed for Pain. Give 0.5 tablet by mouth every 4hrs as needed for pain           Review of patient's allergies indicates:   Allergen Reactions    Ace  inhibitors Other (See Comments)    Betadine [povidone-iodine] Rash    Dye Other (See Comments)    Gentamicin Rash       Past Medical History:   Diagnosis Date    Arthritis     CHF (congestive heart failure)     Diabetes mellitus     Encounter for blood transfusion     Hypertension      Past Surgical History:   Procedure Laterality Date    EYE SURGERY      cataracts    HYSTERECTOMY      patial      Social History     Tobacco Use    Smoking status: Never    Smokeless tobacco: Never   Substance Use Topics    Alcohol use: No    Drug use: No        Review of Systems:    As noted in HPI in addition      REVIEW OF SYSTEMS  CARDIOVASCULAR: No recent chest pain, palpitations, arm, neck, or jaw pain  RESPIRATORY: No recent fever, cough chills, SOB or congestion  : No blood in the urine  GI: No Nausea, vomiting, constipation, diarrhea, blood, or reflux.  MUSCULOSKELETAL: No myalgias  NEURO: No lightheadedness or dizziness  EYES: No Double vision, blurry, vision or headache              Objective        Vitals:    04/06/23 0914   BP: 130/76   Pulse: 60       LIPIDS - LAST 2   Lab Results   Component Value Date    CHOL 127 03/02/2023    HDL 28 (L) 03/02/2023    LDLCALC 85.2 03/02/2023    TRIG 69 03/02/2023    CHOLHDL 22.0 03/02/2023       CBC - LAST 2  Lab Results   Component Value Date    WBC 6.80 03/06/2023    WBC 6.14 03/05/2023    RBC 3.85 (L) 03/06/2023    RBC 3.89 (L) 03/05/2023    HGB 11.9 (L) 03/06/2023    HGB 12.0 03/05/2023    HCT 37.8 03/06/2023    HCT 37.4 03/05/2023    MCV 98 03/06/2023    MCV 96 03/05/2023    MCH 30.9 03/06/2023    MCH 30.8 03/05/2023    MCHC 31.5 (L) 03/06/2023    MCHC 32.1 03/05/2023    RDW 13.7 03/06/2023    RDW 13.7 03/05/2023     03/06/2023     (L) 03/05/2023    MPV 11.0 03/06/2023    MPV 11.1 03/05/2023    GRAN 3.0 03/06/2023    GRAN 43.8 03/06/2023    LYMPH 2.8 03/06/2023    LYMPH 40.4 03/06/2023    MONO 0.8 03/06/2023    MONO 11.9 03/06/2023    BASO 0.03 03/06/2023    BASO  0.03 03/05/2023    NRBC 0 03/06/2023    NRBC 0 03/05/2023       CHEMISTRY & LIVER FUNCTION - LAST 2  Lab Results   Component Value Date     03/06/2023     03/05/2023    K 4.8 03/06/2023    K 4.7 03/05/2023     03/06/2023     03/05/2023    CO2 18 (L) 03/06/2023    CO2 23 03/05/2023    ANIONGAP 12 03/06/2023    ANIONGAP 11 03/05/2023    BUN 38 (H) 03/06/2023    BUN 29 (H) 03/05/2023    CREATININE 7.6 (H) 03/06/2023    CREATININE 6.1 (H) 03/05/2023     (H) 03/06/2023     (H) 03/05/2023    CALCIUM 8.3 (L) 03/06/2023    CALCIUM 8.6 (L) 03/05/2023    MG 1.9 03/04/2023    MG 1.9 03/03/2023    ALBUMIN 2.7 (L) 03/06/2023    ALBUMIN 2.6 (L) 03/05/2023    PROT 6.9 03/06/2023    PROT 6.8 03/05/2023    ALKPHOS 32 (L) 03/06/2023    ALKPHOS 34 (L) 03/05/2023    ALT 21 03/06/2023    ALT 19 03/05/2023    AST 24 03/06/2023    AST 27 03/05/2023    BILITOT 0.5 03/06/2023    BILITOT 0.5 03/05/2023        CARDIAC PROFILE - LAST 2  Lab Results   Component Value Date     (H) 03/01/2023    TROPONINI 0.074 (H) 03/01/2023        COAGULATION - LAST 2  No results found for: LABPT, INR, APTT    ENDOCRINE & PSA - LAST 2  Lab Results   Component Value Date    HGBA1C 7.3 (H) 03/01/2023    HGBA1C 5.7 (H) 09/22/2017    TSH 2.225 03/01/2023        ECHOCARDIOGRAM RESULTS  Results for orders placed during the hospital encounter of 03/01/23    Echo    Interpretation Summary  · The left ventricle is normal in size with concentric remodeling and normal systolic function.  · The estimated ejection fraction is 60%.  · Grade II left ventricular diastolic dysfunction.  · Normal right ventricular size with normal right ventricular systolic function.  · Mild left atrial enlargement.  · Mild right atrial enlargement.  · Mild mitral regurgitation.  · The mean diastolic gradient across the mitral valve is 9 mmHg at a heart rate of bpm.  · There is mild to moderate mitral stenosis.  · Moderate tricuspid regurgitation.  ·  There is pulmonary hypertension.  · Normal central venous pressure (3 mmHg).  · The estimated PA systolic pressure is 50 mmHg.      CURRENT/PREVIOUS VISIT EKG  Results for orders placed or performed during the hospital encounter of 03/01/23   EKG 12-lead    Collection Time: 03/01/23  9:00 PM    Narrative    Test Reason : R00.0,    Vent. Rate : 097 BPM     Atrial Rate : 097 BPM     P-R Int : 178 ms          QRS Dur : 124 ms      QT Int : 396 ms       P-R-T Axes : 073 260 048 degrees     QTc Int : 502 ms    Sinus rhythm with marked sinus arrythmia  Right bundle branch block  Abnormal ECG  When compared with ECG of 01-MAR-2023 16:17,  No significant change was found  Confirmed by Grey Toscano MD ([id number]) on 3/9/2023 9:38:14 AM    Referred By: RYAN   SELF           Confirmed By:Grey Toscano MD     No valid procedures specified.   No results found for this or any previous visit.    No valid procedures specified.    PHYSICAL EXAM  CONSTITUTIONAL:  Obese Well built, well nourished in no apparent distress  NECK: no carotid bruit, no JVD  LUNGS:  Diminished in lower lobes.  Mild expiratory wheezing.   CHEST WALL: no tenderness  HEART: regular rate and rhythm, S1, S2 normal, 2/6 systolic murmur  ABDOMEN: soft, non-tender; bowel sounds normal  EXTREMITIES: Extremities normal, 1+ nonpitting bilateral lower extremity edema, no calf tenderness noted  NEURO: AAO X 3    I HAVE REVIEWED :    The vital signs, nurses notes, and all the pertinent radiology and labs.        Current Outpatient Medications   Medication Instructions    apixaban (ELIQUIS) 2.5 mg, Oral, 2 times daily    ascorbic acid (vitamin C) (VITAMIN C) 500 mg, Oral, Daily, Give 2 tablets daily    calcium carbonate (TUMS) 200 mg calcium (500 mg) chewable tablet 1 tablet, Oral, Daily, Take 2 tablets with meals and 3 tablets at night    cetirizine (ZYRTEC) 5 mg, Oral    gabapentin (NEURONTIN) 100 mg, Oral, Nightly    hydrocodone-acetaminophen 5-325mg (NORCO)  5-325 mg per tablet 1 tablet, Oral, Every 6 hours PRN    ipratropium (ATROVENT HFA) 17 mcg/actuation inhaler 1 puff, Inhalation, Every 6 hours PRN, Rescue    loratadine (CLARITIN) 10 mg, Oral, Daily    megestroL (MEGACE) 400 mg, Oral, 2 times daily    metoprolol succinate (TOPROL-XL) 50 mg, Oral, Daily    midodrine (PROAMATINE) 5 mg, Oral, Every 12 hours    oxyCODONE (OXY-IR) 5 mg, Oral, Every 4 hours PRN, Give 0.5 tablet by mouth every 4hrs as needed for pain    VIT BCOMP,C/FOLIC ACID/ZINC (B COMPLEX-C-FOLIC ACID-ZN ORAL) Oral          Assessment & Plan     ESRD (end stage renal disease)  Managed by Nephrology.  Patient was on hemodialysis Monday Wednesday Friday.  Recommend low-sodium heart healthy renal diet.  Recommend 1.5 liter fluid restriction.  Recommend 2 gram low-sodium restriction.    Paroxysmal atrial fibrillation  Patient has a regular rate and rhythm during examination.  Continue current medication regimen-metoprolol succinate 50 milligrams daily, Eliquis 2.5 milligrams b.i.d..  Continue to monitor for bleeding.  Patient and nurse during examination deny any issues with recent dialysis.  No further SVT or tachycardia noted.      Morbid obesity due to excess calories  BMI 43.6.  Recommend low-sodium heart healthy diet.  Recommend calorie restriction.  Recommend weight loss.    Chronic pulmonary edema  Patient has chronic pulmonary edema.  Managed by Nephrology with hemodialysis.  Recommend 1.5 liter fluid restriction and 2 gram low-sodium diet.  Discussed fluid and salt restriction with patient today in office.  Respiratory rate normal and nonlabored.  1+ nonpitting edema in bilateral lower extremities.    Patient no longer voids.  Diuretics are not likely to be effective.  We will defer to Nephrology for management of fluid overload.    SVT (supraventricular tachycardia)  Patient had episodes of SVT during recent hospitalization.  Patient was titrated to 50 milligrams of metoprolol succinate.  Patient  has not had any further issues with hemodialysis since discharge.  Continue current metoprolol dose.  Blood pressure 130/76 today in office.  Continue current medication regimen.          Follow up in about 3 months (around 7/6/2023).

## 2023-04-06 NOTE — ASSESSMENT & PLAN NOTE
Patient has a regular rate and rhythm during examination.  Continue current medication regimen-metoprolol succinate 50 milligrams daily, Eliquis 2.5 milligrams b.i.d..  Continue to monitor for bleeding.  Patient and nurse during examination deny any issues with recent dialysis.  No further SVT or tachycardia noted.

## 2023-04-06 NOTE — ASSESSMENT & PLAN NOTE
Managed by Nephrology.  Patient was on hemodialysis Monday Wednesday Friday.  Recommend low-sodium heart healthy renal diet.  Recommend 1.5 liter fluid restriction.  Recommend 2 gram low-sodium restriction.

## 2023-05-18 ENCOUNTER — HOSPITAL ENCOUNTER (INPATIENT)
Facility: HOSPITAL | Age: 78
LOS: 2 days | DRG: 314 | End: 2023-05-20
Attending: EMERGENCY MEDICINE | Admitting: INTERNAL MEDICINE
Payer: MEDICARE

## 2023-05-18 DIAGNOSIS — R07.9 CHEST PAIN: ICD-10-CM

## 2023-05-18 DIAGNOSIS — N18.6 ESRD (END STAGE RENAL DISEASE): Chronic | ICD-10-CM

## 2023-05-18 DIAGNOSIS — I95.9 HYPOTENSION: Primary | ICD-10-CM

## 2023-05-18 DIAGNOSIS — I21.4 NON-ST ELEVATION MYOCARDIAL INFARCTION (NSTEMI): ICD-10-CM

## 2023-05-18 PROBLEM — E66.01 MORBID OBESITY DUE TO EXCESS CALORIES: Chronic | Status: ACTIVE | Noted: 2017-09-22

## 2023-05-18 PROBLEM — I48.0 PAROXYSMAL ATRIAL FIBRILLATION: Chronic | Status: ACTIVE | Noted: 2023-03-01

## 2023-05-18 PROBLEM — I50.30 DIASTOLIC CONGESTIVE HEART FAILURE: Chronic | Status: ACTIVE | Noted: 2023-05-18

## 2023-05-18 PROBLEM — L89.309 PRESSURE INJURY OF SKIN OF BUTTOCK: Chronic | Status: ACTIVE | Noted: 2023-05-18

## 2023-05-18 LAB
ALBUMIN SERPL BCP-MCNC: 3 G/DL (ref 3.5–5.2)
ALP SERPL-CCNC: 29 U/L (ref 55–135)
ALT SERPL W/O P-5'-P-CCNC: 15 U/L (ref 10–44)
ANION GAP SERPL CALC-SCNC: 9 MMOL/L (ref 8–16)
APTT PPP: <21 SEC (ref 21–32)
AST SERPL-CCNC: 20 U/L (ref 10–40)
BASOPHILS # BLD AUTO: 0.06 K/UL (ref 0–0.2)
BASOPHILS NFR BLD: 0.7 % (ref 0–1.9)
BILIRUB SERPL-MCNC: 0.7 MG/DL (ref 0.1–1)
BNP SERPL-MCNC: 996 PG/ML (ref 0–99)
BUN SERPL-MCNC: 23 MG/DL (ref 8–23)
CALCIUM SERPL-MCNC: 8.1 MG/DL (ref 8.7–10.5)
CHLORIDE SERPL-SCNC: 103 MMOL/L (ref 95–110)
CO2 SERPL-SCNC: 23 MMOL/L (ref 23–29)
CREAT SERPL-MCNC: 5.1 MG/DL (ref 0.5–1.4)
DIFFERENTIAL METHOD: ABNORMAL
EOSINOPHIL # BLD AUTO: 0.2 K/UL (ref 0–0.5)
EOSINOPHIL NFR BLD: 1.6 % (ref 0–8)
ERYTHROCYTE [DISTWIDTH] IN BLOOD BY AUTOMATED COUNT: 14.7 % (ref 11.5–14.5)
EST. GFR  (NO RACE VARIABLE): 8.3 ML/MIN/1.73 M^2
GLUCOSE SERPL-MCNC: 244 MG/DL (ref 70–110)
HCT VFR BLD AUTO: 34.4 % (ref 37–48.5)
HGB BLD-MCNC: 10.6 G/DL (ref 12–16)
IMM GRANULOCYTES # BLD AUTO: 0.22 K/UL (ref 0–0.04)
IMM GRANULOCYTES NFR BLD AUTO: 2.4 % (ref 0–0.5)
INR PPP: 1.1 (ref 0.8–1.2)
LYMPHOCYTES # BLD AUTO: 2.2 K/UL (ref 1–4.8)
LYMPHOCYTES NFR BLD: 23.6 % (ref 18–48)
MAGNESIUM SERPL-MCNC: 1.8 MG/DL (ref 1.6–2.6)
MCH RBC QN AUTO: 31.7 PG (ref 27–31)
MCHC RBC AUTO-ENTMCNC: 30.8 G/DL (ref 32–36)
MCV RBC AUTO: 103 FL (ref 82–98)
MONOCYTES # BLD AUTO: 0.9 K/UL (ref 0.3–1)
MONOCYTES NFR BLD: 10.1 % (ref 4–15)
NEUTROPHILS # BLD AUTO: 5.6 K/UL (ref 1.8–7.7)
NEUTROPHILS NFR BLD: 61.6 % (ref 38–73)
NRBC BLD-RTO: 1 /100 WBC
PLATELET # BLD AUTO: 183 K/UL (ref 150–450)
PMV BLD AUTO: 10.6 FL (ref 9.2–12.9)
POTASSIUM SERPL-SCNC: 4.2 MMOL/L (ref 3.5–5.1)
PROT SERPL-MCNC: 6.8 G/DL (ref 6–8.4)
PROTHROMBIN TIME: 11.9 SEC (ref 9–12.5)
RBC # BLD AUTO: 3.34 M/UL (ref 4–5.4)
SARS-COV-2 RDRP RESP QL NAA+PROBE: NEGATIVE
SODIUM SERPL-SCNC: 135 MMOL/L (ref 136–145)
TROPONIN I SERPL HS-MCNC: 457.1 PG/ML (ref 0–14.9)
TROPONIN I SERPL HS-MCNC: 510.4 PG/ML (ref 0–14.9)
WBC # BLD AUTO: 9.14 K/UL (ref 3.9–12.7)

## 2023-05-18 PROCEDURE — U0002 COVID-19 LAB TEST NON-CDC: HCPCS | Performed by: EMERGENCY MEDICINE

## 2023-05-18 PROCEDURE — 63600175 PHARM REV CODE 636 W HCPCS: Performed by: EMERGENCY MEDICINE

## 2023-05-18 PROCEDURE — 25000003 PHARM REV CODE 250: Performed by: NURSE PRACTITIONER

## 2023-05-18 PROCEDURE — 93005 ELECTROCARDIOGRAM TRACING: CPT | Performed by: INTERNAL MEDICINE

## 2023-05-18 PROCEDURE — 83880 ASSAY OF NATRIURETIC PEPTIDE: CPT | Performed by: EMERGENCY MEDICINE

## 2023-05-18 PROCEDURE — 93010 EKG 12-LEAD: ICD-10-PCS | Mod: ,,, | Performed by: INTERNAL MEDICINE

## 2023-05-18 PROCEDURE — 85610 PROTHROMBIN TIME: CPT | Performed by: EMERGENCY MEDICINE

## 2023-05-18 PROCEDURE — 84484 ASSAY OF TROPONIN QUANT: CPT | Mod: 91 | Performed by: EMERGENCY MEDICINE

## 2023-05-18 PROCEDURE — 25000003 PHARM REV CODE 250: Performed by: EMERGENCY MEDICINE

## 2023-05-18 PROCEDURE — 99291 CRITICAL CARE FIRST HOUR: CPT

## 2023-05-18 PROCEDURE — 96374 THER/PROPH/DIAG INJ IV PUSH: CPT

## 2023-05-18 PROCEDURE — 85730 THROMBOPLASTIN TIME PARTIAL: CPT | Performed by: EMERGENCY MEDICINE

## 2023-05-18 PROCEDURE — 80053 COMPREHEN METABOLIC PANEL: CPT | Performed by: EMERGENCY MEDICINE

## 2023-05-18 PROCEDURE — 85025 COMPLETE CBC W/AUTO DIFF WBC: CPT | Performed by: EMERGENCY MEDICINE

## 2023-05-18 PROCEDURE — 96375 TX/PRO/DX INJ NEW DRUG ADDON: CPT

## 2023-05-18 PROCEDURE — 63600175 PHARM REV CODE 636 W HCPCS: Performed by: NURSE PRACTITIONER

## 2023-05-18 PROCEDURE — 83735 ASSAY OF MAGNESIUM: CPT | Performed by: EMERGENCY MEDICINE

## 2023-05-18 PROCEDURE — 93010 ELECTROCARDIOGRAM REPORT: CPT | Mod: ,,, | Performed by: INTERNAL MEDICINE

## 2023-05-18 PROCEDURE — 21000000 HC CCU ICU ROOM CHARGE

## 2023-05-18 RX ORDER — AMOXICILLIN 250 MG
1 CAPSULE ORAL 2 TIMES DAILY
COMMUNITY
Start: 2022-12-09 | End: 2023-12-09

## 2023-05-18 RX ORDER — ONDANSETRON 2 MG/ML
4 INJECTION INTRAMUSCULAR; INTRAVENOUS EVERY 8 HOURS PRN
Status: DISCONTINUED | OUTPATIENT
Start: 2023-05-18 | End: 2023-05-20 | Stop reason: HOSPADM

## 2023-05-18 RX ORDER — MORPHINE SULFATE 2 MG/ML
2 INJECTION, SOLUTION INTRAMUSCULAR; INTRAVENOUS EVERY 4 HOURS PRN
Status: COMPLETED | OUTPATIENT
Start: 2023-05-18 | End: 2023-05-19

## 2023-05-18 RX ORDER — PROCHLORPERAZINE EDISYLATE 5 MG/ML
5 INJECTION INTRAMUSCULAR; INTRAVENOUS EVERY 6 HOURS PRN
Status: DISCONTINUED | OUTPATIENT
Start: 2023-05-18 | End: 2023-05-20 | Stop reason: HOSPADM

## 2023-05-18 RX ORDER — TALC
6 POWDER (GRAM) TOPICAL NIGHTLY PRN
Status: DISCONTINUED | OUTPATIENT
Start: 2023-05-18 | End: 2023-05-20 | Stop reason: HOSPADM

## 2023-05-18 RX ORDER — POLYETHYLENE GLYCOL 3350 17 G/17G
17 POWDER, FOR SOLUTION ORAL DAILY
COMMUNITY

## 2023-05-18 RX ORDER — ACETAMINOPHEN 325 MG/1
650 TABLET ORAL EVERY 8 HOURS PRN
Status: DISCONTINUED | OUTPATIENT
Start: 2023-05-18 | End: 2023-05-20 | Stop reason: HOSPADM

## 2023-05-18 RX ORDER — SODIUM CHLORIDE 0.9 % (FLUSH) 0.9 %
10 SYRINGE (ML) INJECTION
Status: DISCONTINUED | OUTPATIENT
Start: 2023-05-18 | End: 2023-05-20 | Stop reason: HOSPADM

## 2023-05-18 RX ORDER — ACETAMINOPHEN 325 MG/1
650 TABLET ORAL
Status: COMPLETED | OUTPATIENT
Start: 2023-05-18 | End: 2023-05-18

## 2023-05-18 RX ORDER — HEPARIN SODIUM,PORCINE/D5W 25000/250
12 INTRAVENOUS SOLUTION INTRAVENOUS CONTINUOUS
Status: DISCONTINUED | OUTPATIENT
Start: 2023-05-18 | End: 2023-05-19

## 2023-05-18 RX ORDER — NITROGLYCERIN 0.4 MG/1
0.4 TABLET SUBLINGUAL EVERY 5 MIN PRN
Status: DISCONTINUED | OUTPATIENT
Start: 2023-05-18 | End: 2023-05-20 | Stop reason: HOSPADM

## 2023-05-18 RX ORDER — METOPROLOL TARTRATE 50 MG/1
50 TABLET ORAL DAILY
Status: ON HOLD | COMMUNITY
Start: 2023-04-26 | End: 2023-05-20 | Stop reason: HOSPADM

## 2023-05-18 RX ADMIN — HEPARIN SODIUM 12 UNITS/KG/HR: 10000 INJECTION, SOLUTION INTRAVENOUS at 09:05

## 2023-05-18 RX ADMIN — ONDANSETRON 4 MG: 2 INJECTION INTRAMUSCULAR; INTRAVENOUS at 08:05

## 2023-05-18 RX ADMIN — MORPHINE SULFATE 2 MG: 2 INJECTION, SOLUTION INTRAMUSCULAR; INTRAVENOUS at 08:05

## 2023-05-18 RX ADMIN — Medication 6 MG: at 10:05

## 2023-05-18 RX ADMIN — ACETAMINOPHEN 650 MG: 325 TABLET ORAL at 06:05

## 2023-05-18 NOTE — ED PROVIDER NOTES
Encounter Date: 5/18/2023       History     Chief Complaint   Patient presents with    Hypotension     Sent from [place] for low blood pressure. Pt received dialysis yesterday. Pts complains of pain to tailbone due to wound     Patient sent from nursing care facility.  Patient with history end-stage renal disease.  Patient with recent worsening hypotension.  Patient taken off of metoprolol and put on midodrine.  Blood pressure continues to be low.  Patient denies any chest pain or shortness of breath.  Patient does complain of buttocks pain due to bed sore.  No fever chills.    Review of patient's allergies indicates:   Allergen Reactions    Ace inhibitors Other (See Comments)    Betadine [povidone-iodine] Rash    Dye Other (See Comments)    Gentamicin Rash     Past Medical History:   Diagnosis Date    Arthritis     CHF (congestive heart failure)     Diabetes mellitus     Encounter for blood transfusion     Hypertension      Past Surgical History:   Procedure Laterality Date    EYE SURGERY      cataracts    HYSTERECTOMY      patial      Family History   Problem Relation Age of Onset    Arthritis Mother     Diabetes Mother     Diabetes Daughter     Kidney disease Son     Kidney disease Son      Social History     Tobacco Use    Smoking status: Never    Smokeless tobacco: Never   Substance Use Topics    Alcohol use: No    Drug use: No     Review of Systems   Constitutional:  Negative for chills and fever.   HENT:  Negative for congestion.    Eyes:  Negative for visual disturbance.   Respiratory:  Negative for shortness of breath.    Cardiovascular:  Negative for chest pain and palpitations.   Gastrointestinal:  Negative for abdominal pain and vomiting.   Genitourinary:  Negative for dysuria.   Musculoskeletal:  Negative for joint swelling.   Skin:  Positive for wound.   Neurological:  Negative for headaches.   Psychiatric/Behavioral:  Negative for confusion.      Physical Exam     Initial Vitals [05/18/23 1720]   BP  Pulse Resp Temp SpO2   (!) 104/50 90 18 98.3 °F (36.8 °C) 96 %      MAP       --         Physical Exam    Nursing note and vitals reviewed.  Constitutional: She is not diaphoretic. No distress.   HENT:   Head: Normocephalic and atraumatic.   Eyes: Conjunctivae are normal.   Neck:   Normal range of motion.  Cardiovascular:  Normal rate.           Pulmonary/Chest: Breath sounds normal.   Abdominal: Abdomen is soft. There is no abdominal tenderness.   Musculoskeletal:         General: Normal range of motion.      Cervical back: Normal range of motion.      Comments: Left upper arm with AV fistula in place.  Good thrill.     Neurological: She is alert.   No gross deficits   Skin: No rash noted.   Multiple stage II sacral and buttock decubitus eye.  No surrounding erythema or drainage   Psychiatric:   Patient is pleasant and cooperative.  Somewhat of a poor historian.       ED Course   Critical Care    Date/Time: 5/18/2023 7:09 PM  Performed by: Alexei Nam MD  Authorized by: Alexei Nam MD   Direct patient critical care time: 15 minutes  Additional history critical care time: 5 minutes  Ordering / reviewing critical care time: 5 minutes  Documentation critical care time: 5 minutes  Consulting other physicians critical care time: 5 minutes  Total critical care time (exclusive of procedural time) : 35 minutes      Labs Reviewed   CBC W/ AUTO DIFFERENTIAL - Abnormal; Notable for the following components:       Result Value    RBC 3.34 (*)     Hemoglobin 10.6 (*)     Hematocrit 34.4 (*)      (*)     MCH 31.7 (*)     MCHC 30.8 (*)     RDW 14.7 (*)     Immature Granulocytes 2.4 (*)     Immature Grans (Abs) 0.22 (*)     nRBC 1 (*)     All other components within normal limits   COMPREHENSIVE METABOLIC PANEL - Abnormal; Notable for the following components:    Sodium 135 (*)     Glucose 244 (*)     Creatinine 5.1 (*)     Calcium 8.1 (*)     Albumin 3.0 (*)     Alkaline Phosphatase 29 (*)     eGFR 8.3 (*)      All other components within normal limits   TROPONIN I HIGH SENSITIVITY - Abnormal; Notable for the following components:    Troponin I High Sensitivity 457.1 (*)     All other components within normal limits    Narrative:     Troponin HS critical result(s) called and verbal readback obtained   from Aldair Quintanilla RN (ER) by SWAllyn 05/18/2023 18:48   B-TYPE NATRIURETIC PEPTIDE - Abnormal; Notable for the following components:     (*)     All other components within normal limits   MAGNESIUM   SARS-COV-2 RNA AMPLIFICATION, QUAL   APTT   PROTIME-INR   CBC W/ AUTO DIFFERENTIAL          Imaging Results              X-Ray Chest AP Portable (Final result)  Result time 05/18/23 18:48:32      Final result by Cooper Cuevas MD (05/18/23 18:48:32)                   Narrative:      EXAM: XR CHEST AP PORTABLE    HISTORY: Hypotension    COMPARISON:Chest x-ray dated 3/1/2023    FINDINGS: The lungs are somewhat poorly inflated due to the patient's large size. No acute focal infiltrates are identified. There are no obvious pleural effusions. The cardiac silhouette is mildly prominent.. The pulmonary vasculature appears within normal limits.    IMPRESSION:   Poor lung expansion. No acute process is identified.    Electronically signed by:  Abhishek Cuevas MD  5/18/2023 6:48 PM CDT Workstation: [id number]                                     Medications   heparin 25,000 units in dextrose 5% (100 units/ml) IV bolus from bag INITIAL BOLUS (max bolus 4000 units) (has no administration in time range)   heparin 25,000 units in dextrose 5% 250 mL (100 units/mL) infusion LOW INTENSITY nomogram - [facility] (has no administration in time range)   heparin 25,000 units in dextrose 5% (100 units/ml) IV bolus from bag - ADDITIONAL PRN BOLUS - 60 units/kg (max bolus 4000 units) (has no administration in time range)   heparin 25,000 units in dextrose 5% (100 units/ml) IV bolus from bag - ADDITIONAL PRN BOLUS - 30 units/kg (max bolus 4000 units)  (has no administration in time range)   acetaminophen tablet 650 mg (650 mg Oral Given 5/18/23 4782)     Medical Decision Making:   History:   Old Medical Records: I decided to obtain old medical records.  Old Records Summarized: records from clinic visits and records from previous admission(s).       <> Summary of Records: No definite cardiac history in the past.  Echocardiogram approximally 2 months ago.  Ejection fraction 60%.  Differential Diagnosis:   Acute coronary syndrome, anemia, electrolyte abnormality  Independently Interpreted Test(s):   I have ordered and independently interpreted X-rays - see summary below.       <> Summary of X-Ray Reading(s): No acute cardiopulmonary process  I have ordered and independently interpreted EKG Reading(s) - see summary below       <> Summary of EKG Reading(s): Normal sinus rhythm with ventricular rate of 89.  Patient with new T-wave inversion in V2.  No ST elevation.  Right bundle-branch block.    Clinical Tests:   Lab Tests: Reviewed       <> Summary of Lab: Creatinine 5.1.  Hemoglobin 10.  Troponin 457.   Radiological Study: Reviewed  Medical Tests: Reviewed  ED Management:  Patient presents with worsening hypotension.  Unclear etiology.  Patient does have new EKG changes and elevated troponin of 457.  Patient with likely recent cardiac event.  Cardiology consult, .  He desires unfractionated heparin and aspirin.  That is to be initiated in the emergency department.  Hospitalist consulted for admission                        Clinical Impression:   Final diagnoses:  [I95.9] Hypotension  [I21.4] Non-ST elevation myocardial infarction (NSTEMI) (Primary)        ED Disposition Condition    Admit Stable                Alexei Nam MD  05/18/23 1909       Alexei Nam MD  05/18/23 1917       Alexei Nam MD  05/18/23 1919

## 2023-05-18 NOTE — Clinical Note
Diagnosis: Non-ST elevation myocardial infarction (NSTEMI) [067039]   Future Attending Provider: BEN CHRISTIANSON [[id number]]   Admitting Provider:: BEN CHRISTIANSON [[id number]]

## 2023-05-19 ENCOUNTER — CLINICAL SUPPORT (OUTPATIENT)
Dept: CARDIOLOGY | Facility: HOSPITAL | Age: 78
DRG: 314 | End: 2023-05-19
Payer: MEDICARE

## 2023-05-19 VITALS — HEIGHT: 65 IN | BODY MASS INDEX: 45.48 KG/M2 | WEIGHT: 273 LBS

## 2023-05-19 PROBLEM — I50.32 CHRONIC DIASTOLIC CONGESTIVE HEART FAILURE: Status: ACTIVE | Noted: 2023-05-18

## 2023-05-19 PROBLEM — R79.89 ELEVATED TROPONIN: Status: ACTIVE | Noted: 2023-05-19

## 2023-05-19 LAB
ALBUMIN SERPL BCP-MCNC: 3 G/DL (ref 3.5–5.2)
ALP SERPL-CCNC: 29 U/L (ref 55–135)
ALT SERPL W/O P-5'-P-CCNC: 14 U/L (ref 10–44)
ANION GAP SERPL CALC-SCNC: 8 MMOL/L (ref 8–16)
APTT PPP: 42.6 SEC (ref 21–32)
AST SERPL-CCNC: 18 U/L (ref 10–40)
BASOPHILS # BLD AUTO: 0.08 K/UL (ref 0–0.2)
BASOPHILS # BLD AUTO: 0.08 K/UL (ref 0–0.2)
BASOPHILS NFR BLD: 0.8 % (ref 0–1.9)
BASOPHILS NFR BLD: 0.8 % (ref 0–1.9)
BILIRUB SERPL-MCNC: 0.6 MG/DL (ref 0.1–1)
BSA FOR ECHO PROCEDURE: 2.38 M2
BUN SERPL-MCNC: 27 MG/DL (ref 8–23)
CALCIUM SERPL-MCNC: 7.9 MG/DL (ref 8.7–10.5)
CHLORIDE SERPL-SCNC: 107 MMOL/L (ref 95–110)
CO2 SERPL-SCNC: 24 MMOL/L (ref 23–29)
CREAT SERPL-MCNC: 5.5 MG/DL (ref 0.5–1.4)
CV ECHO LV RWT: 0.52 CM
DIFFERENTIAL METHOD: ABNORMAL
DIFFERENTIAL METHOD: ABNORMAL
DOP CALC MV VTI: 47 CM
ECHO LV POSTERIOR WALL: 1.45 CM (ref 0.6–1.1)
EJECTION FRACTION: 68 %
EOSINOPHIL # BLD AUTO: 0.2 K/UL (ref 0–0.5)
EOSINOPHIL # BLD AUTO: 0.2 K/UL (ref 0–0.5)
EOSINOPHIL NFR BLD: 2 % (ref 0–8)
EOSINOPHIL NFR BLD: 2 % (ref 0–8)
ERYTHROCYTE [DISTWIDTH] IN BLOOD BY AUTOMATED COUNT: 14.7 % (ref 11.5–14.5)
ERYTHROCYTE [DISTWIDTH] IN BLOOD BY AUTOMATED COUNT: 14.7 % (ref 11.5–14.5)
EST. GFR  (NO RACE VARIABLE): 7.5 ML/MIN/1.73 M^2
FRACTIONAL SHORTENING: 40 % (ref 28–44)
GLUCOSE SERPL-MCNC: 139 MG/DL (ref 70–110)
GLUCOSE SERPL-MCNC: 157 MG/DL (ref 70–110)
GLUCOSE SERPL-MCNC: 170 MG/DL (ref 70–110)
HCT VFR BLD AUTO: 35.7 % (ref 37–48.5)
HCT VFR BLD AUTO: 35.7 % (ref 37–48.5)
HGB BLD-MCNC: 10.5 G/DL (ref 12–16)
HGB BLD-MCNC: 10.5 G/DL (ref 12–16)
IMM GRANULOCYTES # BLD AUTO: 0.25 K/UL (ref 0–0.04)
IMM GRANULOCYTES # BLD AUTO: 0.25 K/UL (ref 0–0.04)
IMM GRANULOCYTES NFR BLD AUTO: 2.5 % (ref 0–0.5)
IMM GRANULOCYTES NFR BLD AUTO: 2.5 % (ref 0–0.5)
INTERVENTRICULAR SEPTUM: 1.26 CM (ref 0.6–1.1)
IVC DIAMETER: 1.74 CM
LEFT INTERNAL DIMENSION IN SYSTOLE: 3.32 CM (ref 2.1–4)
LEFT VENTRICLE DIASTOLIC VOLUME INDEX: 34.56 ML/M2
LEFT VENTRICLE DIASTOLIC VOLUME: 78.1 ML
LEFT VENTRICLE MASS INDEX: 144 G/M2
LEFT VENTRICLE SYSTOLIC VOLUME INDEX: 10.4 ML/M2
LEFT VENTRICLE SYSTOLIC VOLUME: 23.4 ML
LEFT VENTRICULAR INTERNAL DIMENSION IN DIASTOLE: 5.53 CM (ref 3.5–6)
LEFT VENTRICULAR MASS: 325.38 G
LYMPHOCYTES # BLD AUTO: 2.6 K/UL (ref 1–4.8)
LYMPHOCYTES # BLD AUTO: 2.6 K/UL (ref 1–4.8)
LYMPHOCYTES NFR BLD: 25.6 % (ref 18–48)
LYMPHOCYTES NFR BLD: 25.6 % (ref 18–48)
MCH RBC QN AUTO: 31.9 PG (ref 27–31)
MCH RBC QN AUTO: 31.9 PG (ref 27–31)
MCHC RBC AUTO-ENTMCNC: 29.4 G/DL (ref 32–36)
MCHC RBC AUTO-ENTMCNC: 29.4 G/DL (ref 32–36)
MCV RBC AUTO: 109 FL (ref 82–98)
MCV RBC AUTO: 109 FL (ref 82–98)
MONOCYTES # BLD AUTO: 1 K/UL (ref 0.3–1)
MONOCYTES # BLD AUTO: 1 K/UL (ref 0.3–1)
MONOCYTES NFR BLD: 9.8 % (ref 4–15)
MONOCYTES NFR BLD: 9.8 % (ref 4–15)
MV MEAN GRADIENT: 7 MMHG
MV PEAK GRADIENT: 12 MMHG
NEUTROPHILS # BLD AUTO: 6 K/UL (ref 1.8–7.7)
NEUTROPHILS # BLD AUTO: 6 K/UL (ref 1.8–7.7)
NEUTROPHILS NFR BLD: 59.3 % (ref 38–73)
NEUTROPHILS NFR BLD: 59.3 % (ref 38–73)
NRBC BLD-RTO: 0 /100 WBC
NRBC BLD-RTO: 0 /100 WBC
OHS LV EJECTION FRACTION SIMPSONS BIPLANE MOD: 7 %
PLATELET # BLD AUTO: 185 K/UL (ref 150–450)
PLATELET # BLD AUTO: 185 K/UL (ref 150–450)
PMV BLD AUTO: 10.6 FL (ref 9.2–12.9)
PMV BLD AUTO: 10.6 FL (ref 9.2–12.9)
POTASSIUM SERPL-SCNC: 4.7 MMOL/L (ref 3.5–5.1)
PROT SERPL-MCNC: 6.8 G/DL (ref 6–8.4)
RA PRESSURE: 3 MMHG
RBC # BLD AUTO: 3.29 M/UL (ref 4–5.4)
RBC # BLD AUTO: 3.29 M/UL (ref 4–5.4)
SODIUM SERPL-SCNC: 139 MMOL/L (ref 136–145)
TRICUSPID ANNULAR PLANE SYSTOLIC EXCURSION: 1.94 CM
TROPONIN I SERPL HS-MCNC: 231.3 PG/ML (ref 0–14.9)
WBC # BLD AUTO: 10.17 K/UL (ref 3.9–12.7)
WBC # BLD AUTO: 10.17 K/UL (ref 3.9–12.7)

## 2023-05-19 PROCEDURE — 85730 THROMBOPLASTIN TIME PARTIAL: CPT | Performed by: EMERGENCY MEDICINE

## 2023-05-19 PROCEDURE — 25000003 PHARM REV CODE 250: Performed by: NURSE PRACTITIONER

## 2023-05-19 PROCEDURE — 82962 GLUCOSE BLOOD TEST: CPT

## 2023-05-19 PROCEDURE — 85025 COMPLETE CBC W/AUTO DIFF WBC: CPT | Performed by: EMERGENCY MEDICINE

## 2023-05-19 PROCEDURE — 27000221 HC OXYGEN, UP TO 24 HOURS

## 2023-05-19 PROCEDURE — 99222 1ST HOSP IP/OBS MODERATE 55: CPT | Mod: ,,, | Performed by: INTERNAL MEDICINE

## 2023-05-19 PROCEDURE — 99900035 HC TECH TIME PER 15 MIN (STAT)

## 2023-05-19 PROCEDURE — 25000003 PHARM REV CODE 250: Performed by: INTERNAL MEDICINE

## 2023-05-19 PROCEDURE — 21400001 HC TELEMETRY ROOM

## 2023-05-19 PROCEDURE — 84484 ASSAY OF TROPONIN QUANT: CPT | Performed by: NURSE PRACTITIONER

## 2023-05-19 PROCEDURE — 84484 ASSAY OF TROPONIN QUANT: CPT | Mod: 91 | Performed by: NURSE PRACTITIONER

## 2023-05-19 PROCEDURE — 25000003 PHARM REV CODE 250

## 2023-05-19 PROCEDURE — 90935 HEMODIALYSIS ONE EVALUATION: CPT

## 2023-05-19 PROCEDURE — 93308 TTE F-UP OR LMTD: CPT | Mod: 26,,, | Performed by: INTERNAL MEDICINE

## 2023-05-19 PROCEDURE — 63600175 PHARM REV CODE 636 W HCPCS: Performed by: NURSE PRACTITIONER

## 2023-05-19 PROCEDURE — 99222 PR INITIAL HOSPITAL CARE,LEVL II: ICD-10-PCS | Mod: ,,, | Performed by: INTERNAL MEDICINE

## 2023-05-19 PROCEDURE — 80053 COMPREHEN METABOLIC PANEL: CPT | Performed by: NURSE PRACTITIONER

## 2023-05-19 PROCEDURE — S0179 MEGESTROL 20 MG: HCPCS | Performed by: NURSE PRACTITIONER

## 2023-05-19 PROCEDURE — 94761 N-INVAS EAR/PLS OXIMETRY MLT: CPT

## 2023-05-19 PROCEDURE — 36415 COLL VENOUS BLD VENIPUNCTURE: CPT | Performed by: EMERGENCY MEDICINE

## 2023-05-19 PROCEDURE — 94799 UNLISTED PULMONARY SVC/PX: CPT

## 2023-05-19 PROCEDURE — 93308 TTE F-UP OR LMTD: CPT

## 2023-05-19 PROCEDURE — 63600175 PHARM REV CODE 636 W HCPCS: Mod: JZ | Performed by: INTERNAL MEDICINE

## 2023-05-19 PROCEDURE — 93308 ECHO (CUPID ONLY): ICD-10-PCS | Mod: 26,,, | Performed by: INTERNAL MEDICINE

## 2023-05-19 RX ORDER — IBUPROFEN 200 MG
16 TABLET ORAL
Status: DISCONTINUED | OUTPATIENT
Start: 2023-05-19 | End: 2023-05-20 | Stop reason: HOSPADM

## 2023-05-19 RX ORDER — CETIRIZINE HYDROCHLORIDE 5 MG/1
5 TABLET ORAL DAILY
Status: DISCONTINUED | OUTPATIENT
Start: 2023-05-19 | End: 2023-05-20 | Stop reason: HOSPADM

## 2023-05-19 RX ORDER — ALBUTEROL SULFATE 0.83 MG/ML
2.5 SOLUTION RESPIRATORY (INHALATION) EVERY 6 HOURS PRN
Status: DISCONTINUED | OUTPATIENT
Start: 2023-05-19 | End: 2023-05-20 | Stop reason: HOSPADM

## 2023-05-19 RX ORDER — GABAPENTIN 100 MG/1
100 CAPSULE ORAL NIGHTLY
Status: DISCONTINUED | OUTPATIENT
Start: 2023-05-19 | End: 2023-05-20 | Stop reason: HOSPADM

## 2023-05-19 RX ORDER — AMOXICILLIN 250 MG
1 CAPSULE ORAL 2 TIMES DAILY
Status: DISCONTINUED | OUTPATIENT
Start: 2023-05-19 | End: 2023-05-20 | Stop reason: HOSPADM

## 2023-05-19 RX ORDER — MUPIROCIN 20 MG/G
OINTMENT TOPICAL 2 TIMES DAILY
Status: DISCONTINUED | OUTPATIENT
Start: 2023-05-19 | End: 2023-05-19

## 2023-05-19 RX ORDER — MEGESTROL ACETATE 40 MG/ML
400 SUSPENSION ORAL 2 TIMES DAILY
Status: DISCONTINUED | OUTPATIENT
Start: 2023-05-19 | End: 2023-05-20 | Stop reason: HOSPADM

## 2023-05-19 RX ORDER — ENOXAPARIN SODIUM 100 MG/ML
40 INJECTION SUBCUTANEOUS EVERY 24 HOURS
Status: DISCONTINUED | OUTPATIENT
Start: 2023-05-19 | End: 2023-05-19

## 2023-05-19 RX ORDER — GLUCAGON 1 MG
1 KIT INJECTION
Status: DISCONTINUED | OUTPATIENT
Start: 2023-05-19 | End: 2023-05-20 | Stop reason: HOSPADM

## 2023-05-19 RX ORDER — MIDODRINE HYDROCHLORIDE 2.5 MG/1
5 TABLET ORAL
Status: DISCONTINUED | OUTPATIENT
Start: 2023-05-19 | End: 2023-05-20 | Stop reason: HOSPADM

## 2023-05-19 RX ORDER — CALCIUM CARBONATE 200(500)MG
1 TABLET,CHEWABLE ORAL DAILY
Status: DISCONTINUED | OUTPATIENT
Start: 2023-05-19 | End: 2023-05-20 | Stop reason: HOSPADM

## 2023-05-19 RX ORDER — IBUPROFEN 200 MG
24 TABLET ORAL
Status: DISCONTINUED | OUTPATIENT
Start: 2023-05-19 | End: 2023-05-20 | Stop reason: HOSPADM

## 2023-05-19 RX ORDER — OXYCODONE HYDROCHLORIDE 5 MG/1
5 TABLET ORAL EVERY 4 HOURS PRN
Status: DISCONTINUED | OUTPATIENT
Start: 2023-05-19 | End: 2023-05-20 | Stop reason: HOSPADM

## 2023-05-19 RX ORDER — INSULIN ASPART 100 [IU]/ML
0-5 INJECTION, SOLUTION INTRAVENOUS; SUBCUTANEOUS
Status: DISCONTINUED | OUTPATIENT
Start: 2023-05-19 | End: 2023-05-20 | Stop reason: HOSPADM

## 2023-05-19 RX ORDER — METOPROLOL TARTRATE 50 MG/1
50 TABLET ORAL DAILY
Status: DISCONTINUED | OUTPATIENT
Start: 2023-05-19 | End: 2023-05-19

## 2023-05-19 RX ORDER — POLYETHYLENE GLYCOL 3350 17 G/17G
17 POWDER, FOR SOLUTION ORAL DAILY
Status: DISCONTINUED | OUTPATIENT
Start: 2023-05-19 | End: 2023-05-20 | Stop reason: HOSPADM

## 2023-05-19 RX ORDER — ASCORBIC ACID 500 MG
500 TABLET ORAL DAILY
Status: DISCONTINUED | OUTPATIENT
Start: 2023-05-19 | End: 2023-05-20 | Stop reason: HOSPADM

## 2023-05-19 RX ORDER — CHLORHEXIDINE GLUCONATE ORAL RINSE 1.2 MG/ML
15 SOLUTION DENTAL 2 TIMES DAILY
Status: DISCONTINUED | OUTPATIENT
Start: 2023-05-19 | End: 2023-05-19

## 2023-05-19 RX ORDER — IPRATROPIUM BROMIDE 0.5 MG/2.5ML
0.5 SOLUTION RESPIRATORY (INHALATION) EVERY 6 HOURS PRN
Status: DISCONTINUED | OUTPATIENT
Start: 2023-05-19 | End: 2023-05-20 | Stop reason: HOSPADM

## 2023-05-19 RX ORDER — ENOXAPARIN SODIUM 100 MG/ML
30 INJECTION SUBCUTANEOUS EVERY 24 HOURS
Status: DISCONTINUED | OUTPATIENT
Start: 2023-05-19 | End: 2023-05-19 | Stop reason: DRUGHIGH

## 2023-05-19 RX ADMIN — MEGESTROL ACETATE 400 MG: 400 SUSPENSION ORAL at 08:05

## 2023-05-19 RX ADMIN — EPOETIN ALFA-EPBX 6200 UNITS: 10000 INJECTION, SOLUTION INTRAVENOUS; SUBCUTANEOUS at 05:05

## 2023-05-19 RX ADMIN — MORPHINE SULFATE 2 MG: 2 INJECTION, SOLUTION INTRAMUSCULAR; INTRAVENOUS at 12:05

## 2023-05-19 RX ADMIN — MUPIROCIN 1 G: 20 OINTMENT TOPICAL at 11:05

## 2023-05-19 RX ADMIN — MIDODRINE HYDROCHLORIDE 5 MG: 2.5 TABLET ORAL at 06:05

## 2023-05-19 RX ADMIN — CHLORHEXIDINE GLUCONATE 15 ML: 1.2 RINSE ORAL at 11:05

## 2023-05-19 RX ADMIN — OXYCODONE HYDROCHLORIDE 5 MG: 5 TABLET ORAL at 08:05

## 2023-05-19 RX ADMIN — APIXABAN 2.5 MG: 2.5 TABLET, FILM COATED ORAL at 08:05

## 2023-05-19 RX ADMIN — ACETAMINOPHEN 650 MG: 325 TABLET ORAL at 12:05

## 2023-05-19 RX ADMIN — GABAPENTIN 100 MG: 100 CAPSULE ORAL at 08:05

## 2023-05-19 RX ADMIN — SENNOSIDES AND DOCUSATE SODIUM 1 TABLET: 50; 8.6 TABLET ORAL at 08:05

## 2023-05-19 RX ADMIN — MIDODRINE HYDROCHLORIDE 5 MG: 2.5 TABLET ORAL at 11:05

## 2023-05-19 NOTE — PROGRESS NOTES
[facility] Medicine  Progress Note    Patient name: Isabella Boone  MRN: [id number]  Admit Date: 5/18/2023   LOS: 1 day     SUBJECTIVE:     Principal problem: Chest pain    Interval History:  Admitted yesterday for atypical chest pain and hypotension.  Patient denies chest pain to me.  She is a challenging historian.  Aware that she is in hospital and knows the ER but unable to divulge further details about her medical issues.    Summary:  78-year-old  female who is a nursing home resident with complicated medical history including but not limited to paroxysmal atrial fibrillation, diastolic congestive heart failure, ESRD on hemodialysis and anemia of renal disease transferred here for hypotension and questionable atypical chest pain.    She was found to have elevated troponin.  On admission she was initiated on heparin drip due to concerns for NSTEMI which has since been discontinued.  Hypotension appears to be chronic.  Started on midodrine.  Nephrology and cardiology consulted.    Scheduled Meds:   apixaban  2.5 mg Oral BID    ascorbic acid (vitamin C)  500 mg Oral Daily    calcium carbonate  1 tablet Oral Daily    cetirizine  5 mg Oral Daily    chlorhexidine  15 mL Mouth/Throat BID    epoetin cheli-ebpx (RETACRIT) injection  50 Units/kg Intravenous Every Mon, Wed, Fri    gabapentin  100 mg Oral QHS    megestroL  400 mg Oral BID    metoprolol tartrate  50 mg Oral Daily    midodrine  5 mg Oral TID AC    mupirocin   Nasal BID    polyethylene glycol  17 g Oral Daily    senna-docusate 8.6-50 mg  1 tablet Oral BID     Continuous Infusions:  PRN Meds:acetaminophen, acetaminophen, albuterol sulfate, ipratropium, melatonin, nitroGLYCERIN, ondansetron, oxyCODONE, prochlorperazine, sodium chloride 0.9%    Review of patient's allergies indicates:   Allergen Reactions    Ace inhibitors Other (See Comments)    Betadine [povidone-iodine] Rash    Dye Other (See Comments)    Gentamicin Rash        Review of Systems: As per interval history    OBJECTIVE:     Vital Signs (Most Recent)  Temp: 96.8 °F (36 °C) (05/19/23 1355)  Pulse: 86 (05/19/23 1400)  Resp: 18 (05/19/23 1355)  BP: (!) 115/52 (05/19/23 1400)  SpO2: 97 % (05/19/23 1200)    Vital Signs Range (Last 24H):  Temp:  [96.8 °F (36 °C)-98.3 °F (36.8 °C)]   Pulse:  [79-92]   Resp:  [9-24]   BP: ()/(48-69)   SpO2:  [93 %-98 %]     I & O (Last 24H):  Intake/Output Summary (Last 24 hours) at 5/19/2023 1420  Last data filed at 5/19/2023 1222  Gross per 24 hour   Intake 300.85 ml   Output --   Net 300.85 ml       Physical Exam:  General: Patient resting comfortably in no acute distress. Appears as stated age. Calm  Eyes: No conjunctival injection. No scleral icterus.  ENT: Hearing grossly intact. No discharge from ears. No nasal discharge.   Neck: Supple, trachea midline. No JVD  CVS: RRR.  Nonpitting pedal edema involving lower extremities  Lungs:  No tachypnea or accessory muscle use.  Clear to auscultation bilaterally  Abdomen:  Soft, nontender and nondistended.  No organomegaly  Neuro:  Alert.  Moves all extremities.  Skin:  No rash or erythema noted    Laboratory:  I have reviewed all pertinent lab results within the past 24 hours.  CBC:   Recent Labs   Lab 05/19/23  0622   WBC 10.17  10.17   RBC 3.29*  3.29*   HGB 10.5*  10.5*   HCT 35.7*  35.7*     185   *  109*   MCH 31.9*  31.9*   MCHC 29.4*  29.4*     CMP:   Recent Labs   Lab 05/19/23  0622   *   CALCIUM 7.9*   ALBUMIN 3.0*   PROT 6.8      K 4.7   CO2 24      BUN 27*   CREATININE 5.5*   ALKPHOS 29*   ALT 14   AST 18   BILITOT 0.6       Diagnostic Results:  Labs: Reviewed    ASSESSMENT/PLAN:       Active Hospital Problems    Diagnosis  POA    *Chest pain [R07.9]  Yes    Elevated troponin [R77.8]  Yes    Chronic diastolic congestive heart failure [I50.32]  Yes    Pressure injury of skin of buttock [L89.309]  Yes     Chronic    Paroxysmal atrial  fibrillation [I48.0]  Yes     Chronic    ESRD (end stage renal disease) [N18.6]  Yes     Chronic    Morbid obesity due to excess calories [E66.01]  Yes     Chronic      Resolved Hospital Problems   No resolved problems to display.       Plan:   Hypotension appears to be chronic - start midodrine 5 mg TID  Hold metoprolol for now  Nephrology consulted for ongoing dialysis needs   Atypical chest pain as per admission report but denies the same to me.  Troponin is downtrending.  Cardiology consulted on admission; follow recommendations but suspicion for NSTEMI appears to be low  Discontinue heparin drip.  Can switch back to apixaban per renal dosing   Anemia of ESRD; ESAs per nephrology   Wound care consulted for chronic sacral decubiti    Transfer to telemetry unit    VTE Risk Mitigation (From admission, onward)           Ordered     apixaban tablet 2.5 mg  2 times daily         05/19/23 1420     IP VTE HIGH RISK PATIENT  Once         05/18/23 2006     Place sequential compression device  Until discontinued         05/18/23 2006                        Department Hospital Medicine  [facility]  Ori Vazquez MD  Date of service: 05/19/2023

## 2023-05-19 NOTE — ASSESSMENT & PLAN NOTE
Patient is identified as having Diastolic (HFpEF) heart failure that is Chronic. CHF is currently controlled. Latest ECHO performed and demonstrates- Results for orders placed during the hospital encounter of 03/01/23    Echo    Interpretation Summary  · The left ventricle is normal in size with concentric remodeling and normal systolic function.  · The estimated ejection fraction is 60%.  · Grade II left ventricular diastolic dysfunction.  · Normal right ventricular size with normal right ventricular systolic function.  · Mild left atrial enlargement.  · Mild right atrial enlargement.  · Mild mitral regurgitation.  · The mean diastolic gradient across the mitral valve is 9 mmHg at a heart rate of bpm.  · There is mild to moderate mitral stenosis.  · Moderate tricuspid regurgitation.  · There is pulmonary hypertension.  · Normal central venous pressure (3 mmHg).  · The estimated PA systolic pressure is 50 mmHg.  . Continue Beta Blocker and monitor clinical status closely. Monitor on telemetry. Patient is on CHF pathway.  Monitor strict Is&Os and daily weights.  Place on fluid restriction of 1 L. Continue to stress to patient importance of self efficacy and  on diet for CHF. Last BNP reviewed- and noted below   Recent Labs   Lab 05/18/23  1737   *   .  HD scheduled for tomorrow  No resp excerbation/ no pulmonary edema on cxr

## 2023-05-19 NOTE — PROGRESS NOTES
Automatic Inhaler to Nebulizer Interchange    ipratropium/albuterol (Combivent Respimat) 20 mcg/100 mcg-120 mcg/600 mcg changed to ipratropium/albuterol 0.5 mg/2.5 mg every 6 hour prn wheezing  per [facility] Automatic Therapeutic Substitutions Protocol.    Please contact pharmacy at extension [phone] with any questions.     Thank you,   Ericka Kam

## 2023-05-19 NOTE — NURSING
Nurses Note -- 4 Eyes      5/18/2023   9:58 PM      Skin assessed during: Admit      [] No Altered Skin Integrity Present    [x]Prevention Measures Documented      [x] Yes- Altered Skin Integrity Present or Discovered   [x] LDA Added if Not in Epic (Describe Wound)   [x] New Altered Skin Integrity was Present on Admit and Documented in LDA   [x] Wound Image Taken    Wound Care Consulted? Yes      Attending Nurse:  Get Steward RN     Second RN/Staff Member:  GRISEL Boucher RN

## 2023-05-19 NOTE — CONSULTS
Short x-cover neph note.    Spoke to the floor nurse, Dr. Sparks will see patient today.    Robert Frey MD

## 2023-05-19 NOTE — NURSING
1350 - pt transported to dialysis via bed. VSS    1800 - pt transported to room 2510. Belongings sent with pt. Daughter at bedside. Bedside report given to IVAN Segal

## 2023-05-19 NOTE — H&P
[facility] - Emergency Dept  Hospital Medicine  History & Physical    Patient Name: Isabella Boone  MRN: [id number]  Patient Class: OP- Observation  Admission Date: 2023  Attending Physician: Moncho Cartwright MD   Primary Care Provider: Tin Yousif MD         Patient information was obtained from patient and ER records.     Subjective:     Principal Problem:Chest pain    Chief Complaint:   Chief Complaint   Patient presents with    Hypotension     Sent from [place] for low blood pressure. Pt received dialysis yesterday. Pts complains of pain to tailbone due to wound        HPI: Patient is a 79 yo female with h/o HTN, Afib on eliquis, CHF, ESRD HD M,W,F, DM2. Patient is from [facility] and brought to ED by Ems for intermittent mid sternal non radiating chest pain and stage 2 decubitus. She was evaluated in the ED with elevated initial troponin 457.1, EKG showed flip T wave, Dr Cortez contacted and recommended IV heparin gtt ,trend troponin, and cards will see patient in am. She is followed outpatient by cardiologist Dr Talbot.  On assessment she is a poor historian but able to answer some questions appropriate , location and describe chest pain. She denies radiating chest pain, sob, lightheaded or dizziness, N/V. She was recently dx with Afib RVR during and started on Eliquis bid on 2023. Metoprolol 50 mg recommended daily. She denies smoking, alcohol, or drugs    Hospitalist ask to admit for Chest pain r/o ACS.     Last Echo 3/02/23   The left ventricle is normal in size with concentric remodeling and normal systolic function.   The estimated ejection fraction is 60%.   Grade II left ventricular diastolic dysfunction.   Normal right ventricular size with normal right ventricular systolic function.   Mild left atrial enlargement.   Mild right atrial enlargement.   Mild mitral regurgitation.   The mean diastolic gradient across the mitral valve is 9 mmHg at a heart  rate of bpm.   There is mild to moderate mitral stenosis.   Moderate tricuspid regurgitation.   There is pulmonary hypertension.   Normal central venous pressure (3 mmHg).   The estimated PA systolic pressure is 50 mmHg.      Past Medical History:   Diagnosis Date    Arthritis     CHF (congestive heart failure)     Diabetes mellitus     Encounter for blood transfusion     Hypertension        Past Surgical History:   Procedure Laterality Date    EYE SURGERY      cataracts    HYSTERECTOMY      patial        Review of patient's allergies indicates:   Allergen Reactions    Ace inhibitors Other (See Comments)    Betadine [povidone-iodine] Rash    Dye Other (See Comments)    Gentamicin Rash       No current facility-administered medications on file prior to encounter.     Current Outpatient Medications on File Prior to Encounter   Medication Sig    apixaban (ELIQUIS) 2.5 mg Tab Take 1 tablet (2.5 mg total) by mouth 2 (two) times daily.    ascorbic acid, vitamin C, (VITAMIN C) 500 MG tablet Take 500 mg by mouth once daily. Give 2 tablets daily    calcium carbonate (TUMS) 200 mg calcium (500 mg) chewable tablet Take 1 tablet by mouth once daily. Take 2 tablets with meals and 3 tablets at night    cetirizine (ZYRTEC) 10 MG tablet Take 5 mg by mouth.    folic acid/vit B complex and C (ROSARIO-INGRID ORAL) Take 1 tablet by mouth once daily.    gabapentin (NEURONTIN) 100 MG capsule Take 100 mg by mouth every evening.    megestroL (MEGACE) 400 mg/10 mL (40 mg/mL) Susp Take 400 mg by mouth 2 (two) times daily.    metoprolol tartrate (LOPRESSOR) 50 MG tablet Take 50 mg by mouth Daily.    midodrine (PROAMATINE) 5 MG Tab Take 10 mg by mouth 3 (three) times daily with meals.    oxyCODONE (OXY-IR) 5 mg Cap Take 5 mg by mouth every 4 (four) hours as needed for Pain. Give 0.5 tablet by mouth every 4hrs as needed for pain    polyethylene glycol (GLYCOLAX) 17 gram/dose powder Take 17 g by mouth Daily.     hydrocodone-acetaminophen 5-325mg (NORCO) 5-325 mg per tablet Take 1 tablet by mouth every 6 (six) hours as needed.    ipratropium (ATROVENT HFA) 17 mcg/actuation inhaler Inhale 1 puff into the lungs every 6 (six) hours as needed for Wheezing. Rescue    ipratropium-albuteroL (COMBIVENT)  mcg/actuation inhaler Inhale 1 puff into the lungs every 6 (six) hours as needed for Wheezing. Rescue    metoprolol succinate (TOPROL-XL) 50 MG 24 hr tablet Take 1 tablet (50 mg total) by mouth once daily.    senna-docusate 8.6-50 mg (PERICOLACE) 8.6-50 mg per tablet Take 1 tablet by mouth 2 (two) times daily.    [DISCONTINUED] loratadine (CLARITIN) 10 mg tablet Take 10 mg by mouth once daily.    [DISCONTINUED] VIT BCOMP,C/FOLIC ACID/ZINC (B COMPLEX-C-FOLIC ACID-ZN ORAL) Take by mouth.     Family History       Problem Relation (Age of Onset)    Arthritis Mother    Diabetes Mother, Daughter    Kidney disease Son, Son          Tobacco Use    Smoking status: Never    Smokeless tobacco: Never   Substance and Sexual Activity    Alcohol use: No    Drug use: No    Sexual activity: Never     Review of Systems   Constitutional:  Negative for activity change, appetite change, chills, diaphoresis, fatigue, fever and unexpected weight change.   HENT:  Negative for congestion, dental problem, facial swelling, nosebleeds, sinus pressure, sinus pain, sore throat and trouble swallowing.    Eyes:  Negative for pain and redness.   Respiratory:  Negative for apnea, cough, chest tightness, shortness of breath and wheezing.    Cardiovascular:  Positive for chest pain. Negative for palpitations and leg swelling.   Gastrointestinal:  Negative for abdominal distention, abdominal pain, anal bleeding, blood in stool, constipation, diarrhea, nausea and vomiting.   Endocrine: Negative for polyphagia and polyuria.   Genitourinary:  Negative for decreased urine volume, difficulty urinating, dysuria, frequency, hematuria and urgency.    Musculoskeletal:  Negative for back pain, gait problem, joint swelling, myalgias, neck pain and neck stiffness.   Skin:  Positive for wound (DECUBITUS STAGE 2). Negative for color change, pallor and rash.   Neurological:  Negative for dizziness, seizures, syncope, facial asymmetry, speech difficulty, weakness, light-headedness, numbness and headaches.   Psychiatric/Behavioral:  Negative for agitation, behavioral problems, confusion, hallucinations, sleep disturbance and suicidal ideas.    Objective:     Vital Signs (Most Recent):  Temp: 98.3 °F (36.8 °C) (05/18/23 1720)  Pulse: 92 (05/18/23 1737)  Resp: (!) 24 (05/18/23 1737)  BP: (!) 104/50 (05/18/23 1720)  SpO2: 97 % (05/18/23 1737) Vital Signs (24h Range):  Temp:  [98.3 °F (36.8 °C)] 98.3 °F (36.8 °C)  Pulse:  [90-92] 92  Resp:  [18-24] 24  SpO2:  [96 %-97 %] 97 %  BP: (104)/(50) 104/50     Weight: 120.2 kg (265 lb)  Body mass index is 44.1 kg/m².     Physical Exam  Vitals reviewed.   Constitutional:       General: She is not in acute distress.     Appearance: Normal appearance. She is obese. She is not ill-appearing, toxic-appearing or diaphoretic.   HENT:      Head: Normocephalic.      Right Ear: External ear normal.      Left Ear: External ear normal.      Nose: No congestion or rhinorrhea.      Mouth/Throat:      Mouth: Mucous membranes are moist.      Pharynx: Oropharynx is clear. No oropharyngeal exudate or posterior oropharyngeal erythema.   Eyes:      Extraocular Movements: Extraocular movements intact.      Conjunctiva/sclera: Conjunctivae normal.      Pupils: Pupils are equal, round, and reactive to light.   Cardiovascular:      Rate and Rhythm: Normal rate and regular rhythm.      Pulses: Normal pulses.      Heart sounds: Normal heart sounds.   Pulmonary:      Effort: Pulmonary effort is normal.      Breath sounds: Normal breath sounds.   Abdominal:      General: Abdomen is flat. Bowel sounds are normal.      Palpations: Abdomen is soft.    Genitourinary:     Rectum: Normal.   Musculoskeletal:         General: Normal range of motion.      Cervical back: Normal range of motion and neck supple.   Skin:     General: Skin is warm and dry.      Capillary Refill: Capillary refill takes less than 2 seconds.      Findings: Lesion: CRACKED SKIN.   Neurological:      Mental Status: She is alert. Mental status is at baseline.   Psychiatric:         Mood and Affect: Mood normal.            CRANIAL NERVES     CN III, IV, VI   Pupils are equal, round, and reactive to light.     Significant Labs: All pertinent labs within the past 24 hours have been reviewed.  Recent Lab Results         05/18/23  1934   05/18/23  1737        Albumin   3.0       Alkaline Phosphatase   29       ALT   15       Anion Gap   9       aPTT <21.0  Comment: aPTT therapeutic range = 39-69 seconds         AST   20       Baso #   0.06       Basophil %   0.7       BILIRUBIN TOTAL   0.7  Comment: For infants and newborns, interpretation of results should be based  on gestational age, weight and in agreement with clinical  observations.    Premature Infant recommended reference ranges:  Up to 24 hours.............<8.0 mg/dL  Up to 48 hours............<12.0 mg/dL  3-5 days..................<15.0 mg/dL  6-29 days.................<15.0 mg/dL         BNP   996  Comment: Values of less than 100 pg/ml are consistent with non-CHF populations.       BUN   23       Calcium   8.1       Chloride   103       CO2   23       Creatinine   5.1       Differential Method   Automated       eGFR   8.3       Eos #   0.2       Eosinophil %   1.6       Glucose   244       Gran # (ANC)   5.6       Gran %   61.6       Hematocrit   34.4       Hemoglobin   10.6       Immature Grans (Abs)   0.22  Comment: Mild elevation in immature granulocytes is non specific and   can be seen in a variety of conditions including stress response,   acute inflammation, trauma and pregnancy. Correlation with other   laboratory and clinical  findings is essential.         Immature Granulocytes   2.4       INR 1.1  Comment: Coumadin Therapy:  2.0 - 3.0 for INR for all indicators except mechanical heart valves  and antiphospholipid syndromes which should use 2.5 - 3.5.           Lymph #   2.2       Lymph %   23.6       Magnesium   1.8       MCH   31.7       MCHC   30.8       MCV   103       Mono #   0.9       Mono %   10.1       MPV   10.6       nRBC   1       Platelets   183       Potassium   4.2       PROTEIN TOTAL   6.8       Protime 11.9         RBC   3.34       RDW   14.7       SARS-CoV-2 RNA, Amplification, Qual   Negative  Comment: This test utilizes isothermal nucleic acid amplification technology   to   detect the SARS-CoV-2 RdRp nucleic acid segment. The analytical   sensitivity   (limit of detection) is 500 copies/swab.     A POSITIVE result is indicative of the presence of SARS-CoV-2 RNA;   clinical   correlation with patient history and other diagnostic information is   necessary to determine patient infection status.    A NEGATIVE result means that SARS-CoV-2 nucleic acids are not present   above   the limit of detection. A NEGATIVE result should be treated as   presumptive.   It does not rule out the possibility of COVID-19 and should not be   the sole   basis for treatment decisions. If COVID-19 is strongly suspected   based on   clinical and exposure history, re-testing using an alternate   molecular assay   should be considered.     This test is only for use under the Food and Drug Administration s   Emergency   Use Authorization (EUA).     Commercial kits are provided by [employer]. Performance   characteristics of the EUA have been independently verified by   [facility]  _________________________________________________________________   The authorized Fact Sheet for Healthcare Providers and the authorized   Fact   Sheet for Patients of the ID NOW COVID-19 are available on the FDA   website:    https://www.fda.gov/media/899314/download   https://www.fda.gov/media/201048/download         Sodium   135       Troponin I High Sensitivity   457.1  Comment: Troponin results differ between methods. Do not use   results between Troponin methods interchangeably.    Alkaline Phospatase levels above 400 U/L may   cause false positive results.    Access hsTnI should not be used for patients taking   Asfotase cheli (Strensiq).  Troponin HS critical result(s) called and verbal readback obtained   from Aldair Quintanilla RN (ER) by CECILY 05/18/2023 18:48         WBC   9.14               Significant Imaging: I have reviewed all pertinent imaging results/findings within the past 24 hours.    Assessment/Plan:     * Chest pain  ETTA  Npo p mn  initial trop 457.1 will trend trop x2  Echo limited  IV Heparin Gtt ordered in ED and will continue  Consult cardiology --> Dr. Cortez recommended IV Hep gtt, trend trops and cards will evaluate tomorrow        Pressure injury of skin of buttock  Wound care consult  Turn q 2 hours        Diastolic congestive heart failure  Patient is identified as having Diastolic (HFpEF) heart failure that is Chronic. CHF is currently controlled. Latest ECHO performed and demonstrates- Results for orders placed during the hospital encounter of 03/01/23    Echo    Interpretation Summary  · The left ventricle is normal in size with concentric remodeling and normal systolic function.  · The estimated ejection fraction is 60%.  · Grade II left ventricular diastolic dysfunction.  · Normal right ventricular size with normal right ventricular systolic function.  · Mild left atrial enlargement.  · Mild right atrial enlargement.  · Mild mitral regurgitation.  · The mean diastolic gradient across the mitral valve is 9 mmHg at a heart rate of bpm.  · There is mild to moderate mitral stenosis.  · Moderate tricuspid regurgitation.  · There is pulmonary hypertension.  · Normal central venous pressure (3 mmHg).  · The  estimated PA systolic pressure is 50 mmHg.  . Continue Beta Blocker and monitor clinical status closely. Monitor on telemetry. Patient is on CHF pathway.  Monitor strict Is&Os and daily weights.  Place on fluid restriction of 1 L. Continue to stress to patient importance of self efficacy and  on diet for CHF. Last BNP reviewed- and noted below   Recent Labs   Lab 05/18/23  1737   *   .  HD scheduled for tomorrow  No resp excerbation/ no pulmonary edema on cxr    Paroxysmal atrial fibrillation  Patient with Persistent (7 days or more) atrial fibrillation which is controlled currently with Beta Blocker. Patient is currently in sinus rhythm.[id number] Score: 3. HASBLED Score: 4. Anticoagulation indicated. Anticoagulation done with eliquis bid     Will hold Eliquis 2.5 mg due to Iv heparin Gtt initiated  Tele monitor        Morbid obesity due to excess calories  Body mass index is 44.1 kg/m². Morbid obesity complicates all aspects of disease management from diagnostic modalities to treatment. Weight loss encouraged and health benefits explained to patient.     Consider nutritional consult  From Nursing facilty and nutritionist can make adjustments     ESRD (end stage renal disease)  HD M,W,F  Consult Nephrology        VTE Risk Mitigation (From admission, onward)         Ordered     IP VTE HIGH RISK PATIENT  Once         05/18/23 2006     Place sequential compression device  Until discontinued         05/18/23 2006     heparin 25,000 units in dextrose 5% (100 units/ml) IV bolus from bag - ADDITIONAL PRN BOLUS - 60 units/kg (max bolus 4000 units)  As needed (PRN)        Question:  Heparin Infusion Adjustment (DO NOT MODIFY ANSWER)  Answer:  [email or web address]\epic\Images\Pharmacy\HeparinInfusions\heparin LOW INTENSITY nomogram for [facility] [id number]    05/18/23 1856     heparin 25,000 units in dextrose 5% (100 units/ml) IV bolus from bag - ADDITIONAL PRN BOLUS - 30 units/kg (max bolus 4000 units)  As needed (PRN)         Question:  Heparin Infusion Adjustment (DO NOT MODIFY ANSWER)  Answer:  [email or web address]\epic\Images\Pharmacy\HeparinInfusions\heparin LOW INTENSITY nomogram for [facility] [id number].pdf    05/18/23 1856     heparin 25,000 units in dextrose 5% (100 units/ml) IV bolus from bag INITIAL BOLUS (max bolus 4000 units)  Once        Question:  Heparin Infusion Adjustment (DO NOT MODIFY ANSWER)  Answer:  [email or web address]\epic\Images\Pharmacy\HeparinInfusions\heparin LOW INTENSITY nomogram for [facility] [id number]    05/18/23 1856     heparin 25,000 units in dextrose 5% 250 mL (100 units/mL) infusion LOW INTENSITY nomogram - [facility]  Continuous        Question Answer Comment   Heparin Infusion Adjustment (DO NOT MODIFY ANSWER) [email or web address]\epic\Images\Pharmacy\HeparinInfusions\heparin LOW INTENSITY nomogram for [facility] [id number].pdf    Begin at (in units/kg/hr) 12        05/18/23 1856                     On 05/18/2023, patient should be placed in hospital observation services under my care in collaboration with PILLO Eaton  Department of Hospital Medicine  [facility] - Emergency Dept

## 2023-05-19 NOTE — HPI
Patient is a 79 yo female with h/o HTN, Afib on eliquis, CHF, ESRD HD M,W,F, DM2. Patient is from [facility] and brought to ED by Ems for intermittent mid sternal non radiating chest pain and stage 2 decubitus. She was evaluated in the ED with elevated initial troponin 457.1, EKG showed flip T wave, Dr Cortez contacted and recommended IV heparin gtt ,trend troponin, and cards will see patient in am. She is followed outpatient by cardiologist Dr Talbot.  On assessment she is a poor historian but able to answer some questions appropriate , location and describe chest pain. She denies radiating chest pain, sob, lightheaded or dizziness, N/V. She was recently dx with Afib RVR during and started on Eliquis bid on 2023. Metoprolol 50 mg recommended daily. She denies smoking, alcohol, or drugs    Hospitalist ask to admit for Chest pain r/o ACS.     Last Echo 3/02/23  The left ventricle is normal in size with concentric remodeling and normal systolic function.  The estimated ejection fraction is 60%.  Grade II left ventricular diastolic dysfunction.  Normal right ventricular size with normal right ventricular systolic function.  Mild left atrial enlargement.  Mild right atrial enlargement.  Mild mitral regurgitation.  The mean diastolic gradient across the mitral valve is 9 mmHg at a heart rate of bpm.  There is mild to moderate mitral stenosis.  Moderate tricuspid regurgitation.  There is pulmonary hypertension.  Normal central venous pressure (3 mmHg).  The estimated PA systolic pressure is 50 mmHg.

## 2023-05-19 NOTE — PROGRESS NOTES
Pharmacist Renal Dose Adjustment Note    Isabella Boone is a 78 y.o. female being treated with the medication enoxaparin    Patient Data:    Vital Signs (Most Recent):  Temp: 97.5 °F (36.4 °C) (05/19/23 0701)  Pulse: 83 (05/19/23 0900)  Resp: 11 (05/19/23 0900)  BP: (!) 106/55 (05/19/23 0900)  SpO2: 95 % (05/19/23 0900) Vital Signs (72h Range):  Temp:  [97 °F (36.1 °C)-98.3 °F (36.8 °C)]   Pulse:  [79-92]   Resp:  [9-24]   BP: (103-137)/(50-69)   SpO2:  [93 %-98 %]      Recent Labs   Lab 05/18/23  1737 05/19/23 0622   CREATININE 5.1* 5.5*     Serum creatinine: 5.5 mg/dL (H) 05/19/23 0622  Estimated creatinine clearance: 11.5 mL/min (A)    Enoxaparin 30 mg every 24 hour will be changed to enoxaparin 40 mg every 24 hour for BMI > 40 kg/m2    Pharmacist's Name: Ericka Kam  Pharmacist's Extension: [phone]

## 2023-05-19 NOTE — CARE UPDATE
05/19/23 0002   Patient Assessment/Suction   Respiratory Effort Unlabored   Rhythm/Pattern, Respiratory pattern regular   PRE-TX-O2   Device (Oxygen Therapy) room air   SpO2 (!) 93 %   Pulse Oximetry Type Continuous   $ Pulse Oximetry - Multiple Charge Pulse Oximetry - Multiple   Pulse 91   Resp 19   BP (!) 106/57   Respiratory Evaluation   $ Care Plan Tech Time 15 min   $ Eval/Re-eval Charges Evaluation   Evaluation For New Orders

## 2023-05-19 NOTE — CONSULTS
INPATIENT NEPHROLOGY CONSULT   [facility]    Patient Name: Isabella Boone  Date: 2023    Reason for consultation: ESRD    Chief Complaint:   Chief Complaint   Patient presents with    Hypotension     Sent from [place] for low blood pressure. Pt received dialysis yesterday. Pts complains of pain to tailbone due to wound       History of Present Illness:  Patient is a 79 yo female with h/o HTN, Afib on eliquis, CHF, ESRD HD M,W,F, DM2. Patient is from [facility] and brought to ED by Ems for intermittent mid sternal non radiating chest pain and stage 2 decubitus. She was evaluated in the ED with elevated initial troponin 457.1, EKG showed flip T wave, Dr Cortez contacted and recommended IV heparin gtt ,trend troponin, and cards will see patient in am. She is followed outpatient by cardiologist Dr Talbot. She is a poor historian but able to answer some questions appropriate , location and describe chest pain. She denies radiating chest pain, sob, lightheaded or dizziness, N/V. She was recently dx with Afib RVR during and started on Eliquis bid on 2023. Metoprolol 50 mg recommended daily. She denies smoking, alcohol, or drugs. We are consulted for dialysis.     Interval History:  - VSS, on RA, cp free currently    Plan of Care:    Assessment:  Chest pain- r/u ACS  ESRD on HD MWF  Chronic hypotension- hx of D CHF- Pulm HTN- Edematous  SHPT  Anemia of CKD    Plan:    - f/u cards evaluation  - ordered HD MWF  - resume midodrine- ordered 2-3L UF  - resume tums with meals  - ordered SHAR with HD    Thank you for allowing us to participate in this patient's care. We will continue to follow.    Vital Signs:  Temp Readings from Last 3 Encounters:   23 97.5 °F (36.4 °C) (Tympanic)   23 97.7 °F (36.5 °C) (Oral)   17 97 °F (36.1 °C)       Pulse Readings from Last 3 Encounters:   23 83   23 60   23 83       BP Readings from Last 3 Encounters:   23  (!) 106/55   04/06/23 130/76   03/06/23 (!) 149/65       Weight:  Wt Readings from Last 3 Encounters:   05/18/23 124 kg (273 lb 5.9 oz)   05/19/23 123.8 kg (273 lb)   04/06/23 118.8 kg (262 lb)       Past Medical & Surgical History:  Past Medical History:   Diagnosis Date    Arthritis     CHF (congestive heart failure)     Diabetes mellitus     Encounter for blood transfusion     Hypertension        Past Surgical History:   Procedure Laterality Date    EYE SURGERY      cataracts    HYSTERECTOMY      patial        Past Social History:  Social History     Socioeconomic History    Marital status: Single   Tobacco Use    Smoking status: Never    Smokeless tobacco: Never   Substance and Sexual Activity    Alcohol use: No    Drug use: No    Sexual activity: Never       Medications:  Scheduled Meds:   ascorbic acid (vitamin C)  500 mg Oral Daily    calcium carbonate  1 tablet Oral Daily    cetirizine  5 mg Oral Daily    chlorhexidine  15 mL Mouth/Throat BID    enoxparin  40 mg Subcutaneous Q24H (prophylaxis, 1700)    gabapentin  100 mg Oral QHS    megestroL  400 mg Oral BID    metoprolol tartrate  50 mg Oral Daily    midodrine  5 mg Oral TID AC    mupirocin   Nasal BID    polyethylene glycol  17 g Oral Daily    senna-docusate 8.6-50 mg  1 tablet Oral BID     Continuous Infusions:  PRN Meds:.acetaminophen, acetaminophen, albuterol sulfate, ipratropium, melatonin, nitroGLYCERIN, ondansetron, oxyCODONE, prochlorperazine, sodium chloride 0.9%  No current facility-administered medications on file prior to encounter.     Current Outpatient Medications on File Prior to Encounter   Medication Sig Dispense Refill    apixaban (ELIQUIS) 2.5 mg Tab Take 1 tablet (2.5 mg total) by mouth 2 (two) times daily. 60 tablet 0    ascorbic acid, vitamin C, (VITAMIN C) 500 MG tablet Take 500 mg by mouth once daily. Give 2 tablets daily      calcium carbonate (TUMS) 200 mg calcium (500 mg) chewable tablet Take 1 tablet by mouth once daily. Take 2  tablets with meals and 3 tablets at night      cetirizine (ZYRTEC) 10 MG tablet Take 5 mg by mouth.      folic acid/vit B complex and C (ROSARIO-INGRID ORAL) Take 1 tablet by mouth once daily.      gabapentin (NEURONTIN) 100 MG capsule Take 100 mg by mouth every evening.      megestroL (MEGACE) 400 mg/10 mL (40 mg/mL) Susp Take 400 mg by mouth 2 (two) times daily.      metoprolol tartrate (LOPRESSOR) 50 MG tablet Take 50 mg by mouth Daily.      midodrine (PROAMATINE) 5 MG Tab Take 10 mg by mouth 3 (three) times daily with meals.      oxyCODONE (OXY-IR) 5 mg Cap Take 5 mg by mouth every 4 (four) hours as needed for Pain. Give 0.5 tablet by mouth every 4hrs as needed for pain      polyethylene glycol (GLYCOLAX) 17 gram/dose powder Take 17 g by mouth Daily.      hydrocodone-acetaminophen 5-325mg (NORCO) 5-325 mg per tablet Take 1 tablet by mouth every 6 (six) hours as needed. 15 tablet 0    ipratropium (ATROVENT HFA) 17 mcg/actuation inhaler Inhale 1 puff into the lungs every 6 (six) hours as needed for Wheezing. Rescue      ipratropium-albuteroL (COMBIVENT)  mcg/actuation inhaler Inhale 1 puff into the lungs every 6 (six) hours as needed for Wheezing. Rescue      metoprolol succinate (TOPROL-XL) 50 MG 24 hr tablet Take 1 tablet (50 mg total) by mouth once daily. 30 tablet 11    senna-docusate 8.6-50 mg (PERICOLACE) 8.6-50 mg per tablet Take 1 tablet by mouth 2 (two) times daily.         Allergies:  Ace inhibitors, Betadine [povidone-iodine], Dye, and Gentamicin    Past Family History:  Reviewed; refer to Hospitalist Admission Note    Review of Systems:  Review of Systems - All 14 systems reviewed and negative, except as noted in HPI    Physical Exam:  General Appearance:    NAD, AAO x 3, cooperative, appears stated age   Head:    Normocephalic, atraumatic   Eyes:    PER, EOMI, and conjunctiva/sclera clear bilaterally       Mouth:   Moist mucus membranes, no thrush or oral lesions,       normal dentition   Back:      No CVA tenderness   Lungs:     Clear to auscultation bilaterally, no wheezes, crackles,           rales or rhonchi, symmetric air movement, respirations unlabored   Chest wall:    No tenderness or deformity   Heart:    Regular rate and rhythm, S1 and S2 normal, no murmur, rub   or gallop   Abdomen:     Soft, non-tender, non-distended, bowel sounds active all four   quadrants, no RT or guarding, no masses, no organomegaly   Extremities:   Warm and well perfused, distal pulses are intact, no             cyanosis, edematous   MSK:   No joint or muscle swelling, tenderness or deformity   Skin:   Skin color, texture, turgor normal, no rashes or lesions   Neurologic/Psychiatric:   CNII-XII intact, normal strength and sensation       throughout, no asterixis; normal affect, memory, judgement     and insight      Results:  Lab Results   Component Value Date     05/19/2023    K 4.7 05/19/2023     05/19/2023    CO2 24 05/19/2023    BUN 27 (H) 05/19/2023    CREATININE 5.5 (H) 05/19/2023    CALCIUM 7.9 (L) 05/19/2023    ANIONGAP 8 05/19/2023    ESTGFRAFRICA 5 (A) 09/27/2017    EGFRNONAA 4 (A) 09/27/2017       Lab Results   Component Value Date    CALCIUM 7.9 (L) 05/19/2023    PHOS 5.0 (H) 03/04/2023       Recent Labs   Lab 05/19/23  0622   WBC 10.17  10.17   RBC 3.29*  3.29*   HGB 10.5*  10.5*   HCT 35.7*  35.7*     185   *  109*   MCH 31.9*  31.9*   MCHC 29.4*  29.4*       I have personally reviewed pertinent radiological imaging and reports.    I have spent > 70 minutes providing care for this patient for the above diagnoses. These services have included chart/data/imaging review, evaluation, exam, formulation of plan, , note preparation, and discussions with staff involved in this patient's care.    Wayne Sparks MD MPH  [facility] [address] [address]  [phone] (p)  [phone] (f)

## 2023-05-19 NOTE — CONSULTS
[facility]  Department of Cardiology  Consult Note      PATIENT NAME: Isabella Boone  MRN: [id number]  TODAY'S DATE: 05/19/2023  ADMIT DATE: 5/18/2023                          CONSULT REQUESTED BY: Ori Vazquez MD    SUBJECTIVE     PRINCIPAL PROBLEM: Chest pain      REASON FOR CONSULT:    Elevated Troponin      HPI:      Patient is a 78-year-old female with past medical history of hypertension, atrial fibrillation on Eliquis, CHF, ESRD on HD, diabetes mellitus, hypertension.  Patient was a poor historian and does not know why she was in the hospital.  Per chart review patient had complained chest discomfort and was hypotensive.  Patient was evaluated in the ED and found to have elevated initial troponin of 457 with nonspecific STT wave changes.  Heparin drip was started.  During examination, patient denies chest pain, jaw neck or arm pain, lightheadedness, palpitations, dizziness, and shortness of breath.  No acute complaints or symptoms.  Patient was recently diagnosed with AFib RVR and was started low-dose Eliquis.  Patient follows with Dr. Talbot.    In ED:  H&H 10.6/34.4, K 4.2, creatinine 5.1, magnesium 1.8, , troponin .1,  > 510.4,  > 231.3.  EKG shows normal sinus rhythm, right bundle-branch block, abnormal ECG with nonspecific T-wave abnormality now in inferior leads.  Chest x-ray shows poor lung expansion with no acute process identified.    2D echocardiogram pending.  Patient was in sinus rhythm with heart rate of 78 during examination.  She was on room air.  98% O2 saturation.  BP 82/42.  No acute complaints.  Denies chest pain or shortness of breath.  Lying flat comfortably in bed in no acute distress.  On heparin drip.    FROM H&P     HPI: Patient is a 79 yo female with h/o HTN, Afib on eliquis, CHF, ESRD HD M,W,F, DM2. Patient is from [facility] and brought to ED by EMS for intermittent mid sternal non radiating chest pain and stage 2 decubitus. She was evaluated in  the ED with elevated initial troponin 457.1, EKG showed flip T wave, Dr Cortez contacted and recommended IV heparin gtt ,trend troponin, and cards will see patient in am. She is followed outpatient by cardiologist Dr Talbot.  On assessment she is a poor historian but able to answer some questions appropriate , location and describe chest pain. She denies radiating chest pain, sob, lightheaded or dizziness, N/V. She was recently dx with Afib RVR during and started on Eliquis bid on 2023. Metoprolol 50 mg recommended daily. She denies smoking, alcohol, or drugs     Hospitalist ask to admit for Chest pain r/o ACS.      Last Echo 3/02/23  The left ventricle is normal in size with concentric remodeling and normal systolic function.  The estimated ejection fraction is 60%.  Grade II left ventricular diastolic dysfunction.  Normal right ventricular size with normal right ventricular systolic function.  Mild left atrial enlargement.  Mild right atrial enlargement.  Mild mitral regurgitation.  The mean diastolic gradient across the mitral valve is 9 mmHg at a heart rate of bpm.  There is mild to moderate mitral stenosis.  Moderate tricuspid regurgitation.  There is pulmonary hypertension.  Normal central venous pressure (3 mmHg).  The estimated PA systolic pressure is 50 mmHg.        Review of patient's allergies indicates:   Allergen Reactions    Ace inhibitors Other (See Comments)    Betadine [povidone-iodine] Rash    Dye Other (See Comments)    Gentamicin Rash       Past Medical History:   Diagnosis Date    Arthritis     CHF (congestive heart failure)     Diabetes mellitus     Encounter for blood transfusion     Hypertension      Past Surgical History:   Procedure Laterality Date    EYE SURGERY      cataracts    HYSTERECTOMY      patial      Social History     Tobacco Use    Smoking status: Never    Smokeless tobacco: Never   Substance Use Topics    Alcohol use: No    Drug use: No        REVIEW OF SYSTEMS    Review  of Systems     Limited review of systems.  Patient is poor historian.  Denies acute complaints.    Constitutional: Negative for chills, fatigue and fever.   Eyes: No double vision, No blurred vision  Neuro: No headaches, No dizziness  Respiratory: Negative for cough, shortness of breath and wheezing.    Cardiovascular: Negative for chest pain. Negative for palpitations and leg swelling.   Gastrointestinal: Negative for abdominal pain, No melena, diarrhea, nausea and vomiting.   Genitourinary: Negative for dysuria and frequency, Negative for hematuria  Skin: Negative for bruising, Negative for edema or discoloration noted.    OBJECTIVE     VITAL SIGNS (Most Recent)  Temp: 96.8 °F (36 °C) (05/19/23 1355)  Pulse: 94 (05/19/23 1630)  Resp: 18 (05/19/23 1355)  BP: (!) 94/46 (05/19/23 1630)  SpO2: 96 % (05/19/23 1355)    VENTILATION STATUS  Resp: 18 (05/19/23 1355)  SpO2: 96 % (05/19/23 1355)           I & O (Last 24H):  Intake/Output Summary (Last 24 hours) at 5/19/2023 1710  Last data filed at 5/19/2023 1222  Gross per 24 hour   Intake 300.85 ml   Output --   Net 300.85 ml       WEIGHTS  Wt Readings from Last 3 Encounters:   05/18/23 2152 124 kg (273 lb 5.9 oz)   05/18/23 1720 120.2 kg (265 lb)   05/19/23 0830 123.8 kg (273 lb)   04/06/23 0914 118.8 kg (262 lb)       PHYSICAL EXAM  GENERAL:  Obese chronically ill-appearing elderly female in no apparent distress alert and oriented to self.   HEENT: Normocephalic. Pupils normal and conjunctivae normal.    NECK: No JVD. No bruit..   THYROID: Thyroid not examined  CARDIAC: Regular rate and rhythm. S1 is normal.S2 is normal.  CHEST ANATOMY: normal.   LUNGS:  Diminished breath sounds  ABDOMEN: Soft Normal bowel sounds. Nontender  URINARY: No chiu catheter   EXTREMITIES: No cyanosis, clubbing  noted at this time.  Nonpitting 1+ pedal edema bilaterally  PERIPHERAL VASCULAR SYSTEM: Good palpable distal pulses.   CENTRAL NERVOUS SYSTEM: No focal motor or sensory deficits noted.    SKIN:  No rash or erythema noted  MUSCLE STRENGTH & TONE:  Moves all extremities  HOME MEDICATIONS:  No current facility-administered medications on file prior to encounter.     Current Outpatient Medications on File Prior to Encounter   Medication Sig Dispense Refill    apixaban (ELIQUIS) 2.5 mg Tab Take 1 tablet (2.5 mg total) by mouth 2 (two) times daily. 60 tablet 0    ascorbic acid, vitamin C, (VITAMIN C) 500 MG tablet Take 500 mg by mouth once daily. Give 2 tablets daily      calcium carbonate (TUMS) 200 mg calcium (500 mg) chewable tablet Take 1 tablet by mouth once daily. Take 2 tablets with meals and 3 tablets at night      cetirizine (ZYRTEC) 10 MG tablet Take 5 mg by mouth.      folic acid/vit B complex and C (ROSARIO-INGRID ORAL) Take 1 tablet by mouth once daily.      gabapentin (NEURONTIN) 100 MG capsule Take 100 mg by mouth every evening.      megestroL (MEGACE) 400 mg/10 mL (40 mg/mL) Susp Take 400 mg by mouth 2 (two) times daily.      metoprolol tartrate (LOPRESSOR) 50 MG tablet Take 50 mg by mouth Daily.      midodrine (PROAMATINE) 5 MG Tab Take 10 mg by mouth 3 (three) times daily with meals.      oxyCODONE (OXY-IR) 5 mg Cap Take 5 mg by mouth every 4 (four) hours as needed for Pain. Give 0.5 tablet by mouth every 4hrs as needed for pain      polyethylene glycol (GLYCOLAX) 17 gram/dose powder Take 17 g by mouth Daily.      hydrocodone-acetaminophen 5-325mg (NORCO) 5-325 mg per tablet Take 1 tablet by mouth every 6 (six) hours as needed. 15 tablet 0    ipratropium (ATROVENT HFA) 17 mcg/actuation inhaler Inhale 1 puff into the lungs every 6 (six) hours as needed for Wheezing. Rescue      ipratropium-albuteroL (COMBIVENT)  mcg/actuation inhaler Inhale 1 puff into the lungs every 6 (six) hours as needed for Wheezing. Rescue      metoprolol succinate (TOPROL-XL) 50 MG 24 hr tablet Take 1 tablet (50 mg total) by mouth once daily. 30 tablet 11    senna-docusate 8.6-50 mg (PERICOLACE) 8.6-50 mg per  tablet Take 1 tablet by mouth 2 (two) times daily.         SCHEDULED MEDS:   apixaban  2.5 mg Oral BID    ascorbic acid (vitamin C)  500 mg Oral Daily    calcium carbonate  1 tablet Oral Daily    cetirizine  5 mg Oral Daily    chlorhexidine  15 mL Mouth/Throat BID    epoetin cheli-ebpx (RETACRIT) injection  50 Units/kg Intravenous Every Mon, Wed, Fri    gabapentin  100 mg Oral QHS    megestroL  400 mg Oral BID    midodrine  5 mg Oral TID AC    mupirocin   Nasal BID    polyethylene glycol  17 g Oral Daily    senna-docusate 8.6-50 mg  1 tablet Oral BID       CONTINUOUS INFUSIONS:        PRN MEDS:acetaminophen, acetaminophen, albuterol sulfate, ipratropium, melatonin, nitroGLYCERIN, ondansetron, oxyCODONE, prochlorperazine, sodium chloride 0.9%    LABS AND DIAGNOSTICS     CBC LAST 3 DAYS  Recent Labs   Lab 05/18/23 1737 05/19/23 0622   WBC 9.14 10.17  10.17   RBC 3.34* 3.29*  3.29*   HGB 10.6* 10.5*  10.5*   HCT 34.4* 35.7*  35.7*   * 109*  109*   MCH 31.7* 31.9*  31.9*   MCHC 30.8* 29.4*  29.4*   RDW 14.7* 14.7*  14.7*    185  185   MPV 10.6 10.6  10.6   GRAN 61.6  5.6 59.3  59.3  6.0  6.0   LYMPH 23.6  2.2 25.6  25.6  2.6  2.6   MONO 10.1  0.9 9.8  9.8  1.0  1.0   BASO 0.06 0.08  0.08   NRBC 1* 0  0       COAGULATION LAST 3 DAYS  Recent Labs   Lab 05/18/23  1934 05/19/23  0622   INR 1.1  --    APTT <21.0 42.6*       CHEMISTRY LAST 3 DAYS  Recent Labs   Lab 05/18/23 1737 05/19/23  0622   * 139   K 4.2 4.7    107   CO2 23 24   ANIONGAP 9 8   BUN 23 27*   CREATININE 5.1* 5.5*   * 157*   CALCIUM 8.1* 7.9*   MG 1.8  --    ALBUMIN 3.0* 3.0*   PROT 6.8 6.8   ALKPHOS 29* 29*   ALT 15 14   AST 20 18   BILITOT 0.7 0.6       CARDIAC PROFILE LAST 3 DAYS  Recent Labs   Lab 05/18/23  1737 05/18/23  2033 05/19/23  0622   *  --   --    TROPONINIHS 457.1* 510.4* 231.3*       ENDOCRINE LAST 3 DAYS  No results for input(s): TSH, PROCAL in the last 168 hours.    LAST  ARTERIAL BLOOD GAS  ABG  No results for input(s): PH, PO2, PCO2, HCO3, BE in the last 168 hours.    LAST 7 DAYS MICROBIOLOGY   Microbiology Results (last 7 days)       ** No results found for the last 168 hours. **            MOST RECENT IMAGING  Echo  · The left ventricle is normal in size with mild concentric hypertrophy   and normal systolic function.  · The estimated ejection fraction is 68%.  · Normal left ventricular diastolic function.  · Normal right ventricular size with normal right ventricular systolic   function.  · Mild left atrial enlargement.  · Normal central venous pressure (3 mmHg).  · The mean diastolic gradient across the mitral valve is 7 mmHg .  · There is mild mitral stenosis.         ECHOCARDIOGRAM RESULTS (last 5)  Results for orders placed during the hospital encounter of 03/01/23    Echo    Interpretation Summary  · The left ventricle is normal in size with concentric remodeling and normal systolic function.  · The estimated ejection fraction is 60%.  · Grade II left ventricular diastolic dysfunction.  · Normal right ventricular size with normal right ventricular systolic function.  · Mild left atrial enlargement.  · Mild right atrial enlargement.  · Mild mitral regurgitation.  · The mean diastolic gradient across the mitral valve is 9 mmHg at a heart rate of bpm.  · There is mild to moderate mitral stenosis.  · Moderate tricuspid regurgitation.  · There is pulmonary hypertension.  · Normal central venous pressure (3 mmHg).  · The estimated PA systolic pressure is 50 mmHg.      CURRENT/PREVIOUS VISIT EKG  Results for orders placed or performed during the hospital encounter of 05/18/23   EKG 12-lead    Collection Time: 05/18/23  5:58 PM    Narrative    Test Reason : I95.9,    Vent. Rate : 089 BPM     Atrial Rate : 089 BPM     P-R Int : 120 ms          QRS Dur : 128 ms      QT Int : 402 ms       P-R-T Axes : 000 264 019 degrees     QTc Int : 489 ms    Normal sinus rhythm  Right bundle branch  block  Abnormal ECG  When compared with ECG of 01-MAR-2023 21:00,  Nonspecific T wave abnormality now evident in Inferior leads    Referred By: [email or web address]   SELF           Confirmed By:            ASSESSMENT/PLAN:     Active Hospital Problems    Diagnosis    *Chest pain    Elevated troponin    Chronic diastolic congestive heart failure    Pressure injury of skin of buttock    Paroxysmal atrial fibrillation    ESRD (end stage renal disease)    Morbid obesity due to excess calories       ASSESSMENT & PLAN:     Atypical chest pain  Diastolic congestive heart failure  Paroxysmal atrial fibrillation  ESRD on HD  Morbid obesity  Hypotension      RECOMMENDATIONS:    Patient was a poor historian.  Patient was reportedly sent for complaints of chest pain and hypotension.  Mildly elevated troponin HS that has down trended.  Likely secondary to hypotension and volume overload.  Patient denies chest pain or anginal equivalent symptoms.  Nonspecific STT wave abnormality in inferior leads.  Patient has CKD on hemodialysis.  Nephrology following.  Recommend medical management.  2D echocardiogram pending.  Recent echocardiogram in March of 2023 shows EF of 60% and grade 2 diastolic dysfunction.  Moderate TR, mild-to-moderate MR, pulmonary hypertension.  Discontinue heparin drip.  Recommend restarting Eliquis 2.5 mg b.i.d..  Midodrine 5 mg t.i.d. initiated for hypotension.  Continue the same.  Thank you for the consultation.  We will follow p.r.n. at this time.      Heladio Garduno MD  Department of Cardiology  Date of Service: 05/19/2023      L 78-year-old admitted with atypical chest pain symptoms.  Noted to have significant comorbidities including end-stage renal disease, chronic congestive heart failure with diastolic dysfunction abnormal troponins secondary to CKD and paroxysmal atrial fibrillation along with morbid obesity.  Currently a resident of [facility], denies having chest discomfort at the time of  examination.  And presently normotensive.  Recommend conservative treatment given her comorbidities.  May consider adding Ranexa to her regimen at 500 mg p.o. b.i.d.  I have personally interviewed and examined the patient, I have reviewed the Nurse Practitioner's history and physical, assessment, and plan. I agree with the findings and plan.      Heladio Garduno M.D.  Department of Cardiology  Date of Service: 05/19/2023  9:47 AM

## 2023-05-19 NOTE — ASSESSMENT & PLAN NOTE
Body mass index is 44.1 kg/m². Morbid obesity complicates all aspects of disease management from diagnostic modalities to treatment. Weight loss encouraged and health benefits explained to patient.     Consider nutritional consult  From Nursing facilty and nutritionist can make adjustments

## 2023-05-19 NOTE — PLAN OF CARE
[facility]  Initial Discharge Assessment       Primary Care Provider: Tin Yousif MD    Admission Diagnosis: Non-ST elevation myocardial infarction (NSTEMI) [I21.4]    Admission Date: 5/18/2023  Expected Discharge Date: 5/19/2023    Transition of Care Barriers: None    Payor: MEDICARE / Plan: MEDICARE PART A & B / Product Type: Government /     Extended Emergency Contact Information  Primary Emergency Contact: RivasEzequiel   [place]  Mobile Phone: [phone]  Relation: Daughter    Discharge Plan A: Return to nursing home  Discharge Plan B: Return to Nursing Home    No Pharmacies Listed    DC assessment completed with pt at bedside, AAOx4. Information verified. Pt is resident at [facility] in [place], plans to return when discharged (confirmed with Lia at [facility]). PCP is Dr. Yousif. Pharm is [facility] with [facility]. Denies blood thinners. DME is listed. HD @ [facility] in [facility]. Insurance on file verified. Needs assistance with ADLs. Wears O2 PRN. Denies POA, NOK is 2 children. Denies recent admission. CM following for DC planning.    Initial Assessment (most recent)       Adult Discharge Assessment - 05/19/23 1028          Discharge Assessment    Assessment Type Discharge Planning Assessment     Confirmed/corrected address, phone number and insurance Yes     Confirmed Demographics Correct on Facesheet     Source of Information patient     Communicated HAN with patient/caregiver Yes     People in Home facility resident     Facility Arrived From: [facility]     Do you expect to return to your current living situation? Yes     Do you have help at home or someone to help you manage your care at home? Yes     Who are your caregiver(s) and their phone number(s)? [facility]     Prior to hospitilization cognitive status: Alert/Oriented     Current cognitive status: Alert/Oriented     Equipment Currently Used at Home walker, rolling;oxygen;bedside commode      Readmission within 30 days? No     Patient currently being followed by outpatient case management? No     Do you currently have service(s) that help you manage your care at home? No     Do you take prescription medications? Yes     Do you have prescription coverage? Yes     Coverage [place] Medicaid     Do you have any problems affording any of your prescribed medications? No     Is the patient taking medications as prescribed? yes     Who is going to help you get home at discharge? [facility]     How do you get to doctors appointments? other (see comments)   [place] transportation    Are you on dialysis? Yes     Dialysis Name and Scheduled days [facility]     Do you take coumadin? No     Discharge Plan A Return to nursing home     Discharge Plan B Return to Nursing Home     DME Needed Upon Discharge  none     Discharge Plan discussed with: Patient;Adult children     Transition of Care Barriers None

## 2023-05-19 NOTE — PROGRESS NOTES
05/19/23 1715   Required for all Hemodialysis Patients   Hepatitis Status negative   Handoff Report   Received From Isiah ESTEVEZ RN   Given To Elly ESTRADA RN   Treatment Type   Treatment Type Maintenance   Vital Signs   Temp 97.5 °F (36.4 °C)   Temp Source Oral   Pulse 94   Resp 16   SpO2 96 %   Flow (L/min) 2   Device (Oxygen Therapy) nasal cannula   BP (!) 128/46   BP Method Automatic   Patient Position Lying   Assessments (Pre/Post)   Consent Obtained yes   Safety vein preservation armband present   Date Hepatitis Profile Obtained 05/03/23   Blood Liters Processed (BLP) 68.1   Transport Modality bed   Level of Consciousness (AVPU) alert   Dialyzer Clearance moderately streaked   Pain   FACES Pain Rating: Rest 0-->no hurt   Post-Hemodialysis Assessment   Rinseback Volume (mL) 250 mL   Blood Volume Processed (Liters) 68.1 L   Dialyzer Clearance Moderately streaked   Duration of Treatment 180 minutes   Total UF (mL) 3500 mL   Net Fluid Removal 3000   Patient Response to Treatment pt maritza tx   Post-Treatment Weight 125.4 kg (276 lb 7.3 oz)   Treatment Weight Change -3   Arterial bleeding stop time (min) 6 min   Venous bleeding stop time (min) 6 min   Post-Hemodialysis Comments pt stable     Pt maritza tx well, 3000 ml net u/f

## 2023-05-19 NOTE — ASSESSMENT & PLAN NOTE
ETTA [place] p mn  initial trop 457.1 will trend trop x2  Echo limited  IV Heparin Gtt ordered in ED and will continue  Consult cardiology --> Dr. Cortez recommended IV Hep gtt, trend trops and cards will evaluate tomorrow

## 2023-05-19 NOTE — NURSING
[facility]  Wound Care    Patient Name:  Isabella Boone   MRN:  [id number]  Date: 5/19/2023  Diagnosis: Chest pain    History:     Past Medical History:   Diagnosis Date    Arthritis     CHF (congestive heart failure)     Diabetes mellitus     Encounter for blood transfusion     Hypertension        Social History     Socioeconomic History    Marital status: Single   Tobacco Use    Smoking status: Never    Smokeless tobacco: Never   Substance and Sexual Activity    Alcohol use: No    Drug use: No    Sexual activity: Never       Precautions:     Allergies as of 05/18/2023 - Reviewed 05/18/2023   Allergen Reaction Noted    Ace inhibitors Other (See Comments) 09/22/2017    Betadine [povidone-iodine] Rash 09/22/2017    Dye Other (See Comments) 09/22/2017    Gentamicin Rash 09/22/2017       WO Assessment Details/Treatment   05/19/2023  78 yr old female   awake   Sacral /buttock/ischium  stage 2 pressure injury  3x6x.2  area with scarring peeling skin         Recommendation:   Buttock ischium  Clean area with chlorhexidine/ns. Pat dry. Apply Triad and place on an air flow pad daily    Turn reposition q2 as pt's condition will allow.  Bilat heel pillows   Float and elevate heels of bed with pillows.  air mattress

## 2023-05-19 NOTE — SUBJECTIVE & OBJECTIVE
Past Medical History:   Diagnosis Date    Arthritis     CHF (congestive heart failure)     Diabetes mellitus     Encounter for blood transfusion     Hypertension        Past Surgical History:   Procedure Laterality Date    EYE SURGERY      cataracts    HYSTERECTOMY      patial        Review of patient's allergies indicates:   Allergen Reactions    Ace inhibitors Other (See Comments)    Betadine [povidone-iodine] Rash    Dye Other (See Comments)    Gentamicin Rash       No current facility-administered medications on file prior to encounter.     Current Outpatient Medications on File Prior to Encounter   Medication Sig    apixaban (ELIQUIS) 2.5 mg Tab Take 1 tablet (2.5 mg total) by mouth 2 (two) times daily.    ascorbic acid, vitamin C, (VITAMIN C) 500 MG tablet Take 500 mg by mouth once daily. Give 2 tablets daily    calcium carbonate (TUMS) 200 mg calcium (500 mg) chewable tablet Take 1 tablet by mouth once daily. Take 2 tablets with meals and 3 tablets at night    cetirizine (ZYRTEC) 10 MG tablet Take 5 mg by mouth.    folic acid/vit B complex and C (ROSARIO-INGRID ORAL) Take 1 tablet by mouth once daily.    gabapentin (NEURONTIN) 100 MG capsule Take 100 mg by mouth every evening.    megestroL (MEGACE) 400 mg/10 mL (40 mg/mL) Susp Take 400 mg by mouth 2 (two) times daily.    metoprolol tartrate (LOPRESSOR) 50 MG tablet Take 50 mg by mouth Daily.    midodrine (PROAMATINE) 5 MG Tab Take 10 mg by mouth 3 (three) times daily with meals.    oxyCODONE (OXY-IR) 5 mg Cap Take 5 mg by mouth every 4 (four) hours as needed for Pain. Give 0.5 tablet by mouth every 4hrs as needed for pain    polyethylene glycol (GLYCOLAX) 17 gram/dose powder Take 17 g by mouth Daily.    hydrocodone-acetaminophen 5-325mg (NORCO) 5-325 mg per tablet Take 1 tablet by mouth every 6 (six) hours as needed.    ipratropium (ATROVENT HFA) 17 mcg/actuation inhaler Inhale 1 puff into the lungs every 6 (six) hours as needed for Wheezing. Rescue     ipratropium-albuteroL (COMBIVENT)  mcg/actuation inhaler Inhale 1 puff into the lungs every 6 (six) hours as needed for Wheezing. Rescue    metoprolol succinate (TOPROL-XL) 50 MG 24 hr tablet Take 1 tablet (50 mg total) by mouth once daily.    senna-docusate 8.6-50 mg (PERICOLACE) 8.6-50 mg per tablet Take 1 tablet by mouth 2 (two) times daily.    [DISCONTINUED] loratadine (CLARITIN) 10 mg tablet Take 10 mg by mouth once daily.    [DISCONTINUED] VIT BCOMP,C/FOLIC ACID/ZINC (B COMPLEX-C-FOLIC ACID-ZN ORAL) Take by mouth.     Family History       Problem Relation (Age of Onset)    Arthritis Mother    Diabetes Mother, Daughter    Kidney disease Son, Son          Tobacco Use    Smoking status: Never    Smokeless tobacco: Never   Substance and Sexual Activity    Alcohol use: No    Drug use: No    Sexual activity: Never     Review of Systems   Constitutional:  Negative for activity change, appetite change, chills, diaphoresis, fatigue, fever and unexpected weight change.   HENT:  Negative for congestion, dental problem, facial swelling, nosebleeds, sinus pressure, sinus pain, sore throat and trouble swallowing.    Eyes:  Negative for pain and redness.   Respiratory:  Negative for apnea, cough, chest tightness, shortness of breath and wheezing.    Cardiovascular:  Positive for chest pain. Negative for palpitations and leg swelling.   Gastrointestinal:  Negative for abdominal distention, abdominal pain, anal bleeding, blood in stool, constipation, diarrhea, nausea and vomiting.   Endocrine: Negative for polyphagia and polyuria.   Genitourinary:  Negative for decreased urine volume, difficulty urinating, dysuria, frequency, hematuria and urgency.   Musculoskeletal:  Negative for back pain, gait problem, joint swelling, myalgias, neck pain and neck stiffness.   Skin:  Positive for wound (DECUBITUS STAGE 2). Negative for color change, pallor and rash.   Neurological:  Negative for dizziness, seizures, syncope, facial  asymmetry, speech difficulty, weakness, light-headedness, numbness and headaches.   Psychiatric/Behavioral:  Negative for agitation, behavioral problems, confusion, hallucinations, sleep disturbance and suicidal ideas.    Objective:     Vital Signs (Most Recent):  Temp: 98.3 °F (36.8 °C) (05/18/23 1720)  Pulse: 92 (05/18/23 1737)  Resp: (!) 24 (05/18/23 1737)  BP: (!) 104/50 (05/18/23 1720)  SpO2: 97 % (05/18/23 1737) Vital Signs (24h Range):  Temp:  [98.3 °F (36.8 °C)] 98.3 °F (36.8 °C)  Pulse:  [90-92] 92  Resp:  [18-24] 24  SpO2:  [96 %-97 %] 97 %  BP: (104)/(50) 104/50     Weight: 120.2 kg (265 lb)  Body mass index is 44.1 kg/m².     Physical Exam  Vitals reviewed.   Constitutional:       General: She is not in acute distress.     Appearance: Normal appearance. She is obese. She is not ill-appearing, toxic-appearing or diaphoretic.   HENT:      Head: Normocephalic.      Right Ear: External ear normal.      Left Ear: External ear normal.      Nose: No congestion or rhinorrhea.      Mouth/Throat:      Mouth: Mucous membranes are moist.      Pharynx: Oropharynx is clear. No oropharyngeal exudate or posterior oropharyngeal erythema.   Eyes:      Extraocular Movements: Extraocular movements intact.      Conjunctiva/sclera: Conjunctivae normal.      Pupils: Pupils are equal, round, and reactive to light.   Cardiovascular:      Rate and Rhythm: Normal rate and regular rhythm.      Pulses: Normal pulses.      Heart sounds: Normal heart sounds.   Pulmonary:      Effort: Pulmonary effort is normal.      Breath sounds: Normal breath sounds.   Abdominal:      General: Abdomen is flat. Bowel sounds are normal.      Palpations: Abdomen is soft.   Genitourinary:     Rectum: Normal.   Musculoskeletal:         General: Normal range of motion.      Cervical back: Normal range of motion and neck supple.   Skin:     General: Skin is warm and dry.      Capillary Refill: Capillary refill takes less than 2 seconds.      Findings:  Lesion: CRACKED SKIN.   Neurological:      Mental Status: She is alert. Mental status is at baseline.   Psychiatric:         Mood and Affect: Mood normal.            CRANIAL NERVES     CN III, IV, VI   Pupils are equal, round, and reactive to light.     Significant Labs: All pertinent labs within the past 24 hours have been reviewed.  Recent Lab Results         05/18/23  1934 05/18/23  1737        Albumin   3.0       Alkaline Phosphatase   29       ALT   15       Anion Gap   9       aPTT <21.0  Comment: aPTT therapeutic range = 39-69 seconds         AST   20       Baso #   0.06       Basophil %   0.7       BILIRUBIN TOTAL   0.7  Comment: For infants and newborns, interpretation of results should be based  on gestational age, weight and in agreement with clinical  observations.    Premature Infant recommended reference ranges:  Up to 24 hours.............<8.0 mg/dL  Up to 48 hours............<12.0 mg/dL  3-5 days..................<15.0 mg/dL  6-29 days.................<15.0 mg/dL         BNP   996  Comment: Values of less than 100 pg/ml are consistent with non-CHF populations.       BUN   23       Calcium   8.1       Chloride   103       CO2   23       Creatinine   5.1       Differential Method   Automated       eGFR   8.3       Eos #   0.2       Eosinophil %   1.6       Glucose   244       Gran # (ANC)   5.6       Gran %   61.6       Hematocrit   34.4       Hemoglobin   10.6       Immature Grans (Abs)   0.22  Comment: Mild elevation in immature granulocytes is non specific and   can be seen in a variety of conditions including stress response,   acute inflammation, trauma and pregnancy. Correlation with other   laboratory and clinical findings is essential.         Immature Granulocytes   2.4       INR 1.1  Comment: Coumadin Therapy:  2.0 - 3.0 for INR for all indicators except mechanical heart valves  and antiphospholipid syndromes which should use 2.5 - 3.5.           Lymph #   2.2       Lymph %   23.6        Magnesium   1.8       MCH   31.7       MCHC   30.8       MCV   103       Mono #   0.9       Mono %   10.1       MPV   10.6       nRBC   1       Platelets   183       Potassium   4.2       PROTEIN TOTAL   6.8       Protime 11.9         RBC   3.34       RDW   14.7       SARS-CoV-2 RNA, Amplification, Qual   Negative  Comment: This test utilizes isothermal nucleic acid amplification technology   to   detect the SARS-CoV-2 RdRp nucleic acid segment. The analytical   sensitivity   (limit of detection) is 500 copies/swab.     A POSITIVE result is indicative of the presence of SARS-CoV-2 RNA;   clinical   correlation with patient history and other diagnostic information is   necessary to determine patient infection status.    A NEGATIVE result means that SARS-CoV-2 nucleic acids are not present   above   the limit of detection. A NEGATIVE result should be treated as   presumptive.   It does not rule out the possibility of COVID-19 and should not be   the sole   basis for treatment decisions. If COVID-19 is strongly suspected   based on   clinical and exposure history, re-testing using an alternate   molecular assay   should be considered.     This test is only for use under the Food and Drug Administration s   Emergency   Use Authorization (EUA).     Commercial kits are provided by [employer]. Performance   characteristics of the EUA have been independently verified by   [facility]  _________________________________________________________________   The authorized Fact Sheet for Healthcare Providers and the authorized   Fact   Sheet for Patients of the ID NOW COVID-19 are available on the FDA   website:   https://www.fda.gov/media/632806/download   https://www.fda.gov/media/245239/download         Sodium   135       Troponin I High Sensitivity   457.1  Comment: Troponin results differ between methods. Do not use   results between Troponin methods interchangeably.    Alkaline Phospatase  levels above 400 U/L may   cause false positive results.    Access hsTnI should not be used for patients taking   Asfotase cheli (Strensiq).  Troponin HS critical result(s) called and verbal readback obtained   from Aldair Quintanilla RN (ER) by CECILY 05/18/2023 18:48         WBC   9.14               Significant Imaging: I have reviewed all pertinent imaging results/findings within the past 24 hours.

## 2023-05-19 NOTE — ASSESSMENT & PLAN NOTE
Patient with Persistent (7 days or more) atrial fibrillation which is controlled currently with Beta Blocker. Patient is currently in sinus rhythm.[id number] Score: 3. HASBLED Score: 4. Anticoagulation indicated. Anticoagulation done with eliquis bid     Will hold Eliquis 2.5 mg due to Iv heparin Gtt initiated  Tele monitor

## 2023-05-20 VITALS
OXYGEN SATURATION: 95 % | TEMPERATURE: 98 F | HEART RATE: 98 BPM | DIASTOLIC BLOOD PRESSURE: 57 MMHG | WEIGHT: 278 LBS | BODY MASS INDEX: 46.32 KG/M2 | SYSTOLIC BLOOD PRESSURE: 121 MMHG | HEIGHT: 65 IN | RESPIRATION RATE: 17 BRPM

## 2023-05-20 PROBLEM — R07.9 CHEST PAIN: Status: RESOLVED | Noted: 2023-05-18 | Resolved: 2023-05-20

## 2023-05-20 PROBLEM — I95.9 HYPOTENSION: Status: ACTIVE | Noted: 2023-05-20

## 2023-05-20 LAB
ALBUMIN SERPL BCP-MCNC: 3.1 G/DL (ref 3.5–5.2)
ALP SERPL-CCNC: 30 U/L (ref 55–135)
ALT SERPL W/O P-5'-P-CCNC: 14 U/L (ref 10–44)
ANION GAP SERPL CALC-SCNC: 11 MMOL/L (ref 8–16)
AST SERPL-CCNC: 17 U/L (ref 10–40)
BASOPHILS # BLD AUTO: 0.08 K/UL (ref 0–0.2)
BASOPHILS NFR BLD: 0.9 % (ref 0–1.9)
BILIRUB SERPL-MCNC: 0.6 MG/DL (ref 0.1–1)
BUN SERPL-MCNC: 16 MG/DL (ref 8–23)
CALCIUM SERPL-MCNC: 8.7 MG/DL (ref 8.7–10.5)
CHLORIDE SERPL-SCNC: 105 MMOL/L (ref 95–110)
CO2 SERPL-SCNC: 26 MMOL/L (ref 23–29)
CREAT SERPL-MCNC: 4 MG/DL (ref 0.5–1.4)
DIFFERENTIAL METHOD: ABNORMAL
EOSINOPHIL # BLD AUTO: 0.2 K/UL (ref 0–0.5)
EOSINOPHIL NFR BLD: 2.2 % (ref 0–8)
ERYTHROCYTE [DISTWIDTH] IN BLOOD BY AUTOMATED COUNT: 15 % (ref 11.5–14.5)
EST. GFR  (NO RACE VARIABLE): 11.1 ML/MIN/1.73 M^2
GLUCOSE SERPL-MCNC: 112 MG/DL (ref 70–110)
GLUCOSE SERPL-MCNC: 120 MG/DL (ref 70–110)
GLUCOSE SERPL-MCNC: 137 MG/DL (ref 70–110)
GLUCOSE SERPL-MCNC: 160 MG/DL (ref 70–110)
HCT VFR BLD AUTO: 35.7 % (ref 37–48.5)
HGB BLD-MCNC: 10.5 G/DL (ref 12–16)
IMM GRANULOCYTES # BLD AUTO: 0.28 K/UL (ref 0–0.04)
IMM GRANULOCYTES NFR BLD AUTO: 3.2 % (ref 0–0.5)
LYMPHOCYTES # BLD AUTO: 1.7 K/UL (ref 1–4.8)
LYMPHOCYTES NFR BLD: 18.9 % (ref 18–48)
MCH RBC QN AUTO: 31.3 PG (ref 27–31)
MCHC RBC AUTO-ENTMCNC: 29.4 G/DL (ref 32–36)
MCV RBC AUTO: 107 FL (ref 82–98)
MONOCYTES # BLD AUTO: 0.9 K/UL (ref 0.3–1)
MONOCYTES NFR BLD: 10.6 % (ref 4–15)
NEUTROPHILS # BLD AUTO: 5.6 K/UL (ref 1.8–7.7)
NEUTROPHILS NFR BLD: 64.2 % (ref 38–73)
NRBC BLD-RTO: 1 /100 WBC
PLATELET # BLD AUTO: 165 K/UL (ref 150–450)
PMV BLD AUTO: 11 FL (ref 9.2–12.9)
POTASSIUM SERPL-SCNC: 4.3 MMOL/L (ref 3.5–5.1)
PROT SERPL-MCNC: 7 G/DL (ref 6–8.4)
RBC # BLD AUTO: 3.35 M/UL (ref 4–5.4)
SODIUM SERPL-SCNC: 142 MMOL/L (ref 136–145)
WBC # BLD AUTO: 8.77 K/UL (ref 3.9–12.7)

## 2023-05-20 PROCEDURE — 85025 COMPLETE CBC W/AUTO DIFF WBC: CPT | Performed by: NURSE PRACTITIONER

## 2023-05-20 PROCEDURE — 25000003 PHARM REV CODE 250: Performed by: NURSE PRACTITIONER

## 2023-05-20 PROCEDURE — 80053 COMPREHEN METABOLIC PANEL: CPT | Performed by: NURSE PRACTITIONER

## 2023-05-20 PROCEDURE — 27000221 HC OXYGEN, UP TO 24 HOURS

## 2023-05-20 PROCEDURE — 36415 COLL VENOUS BLD VENIPUNCTURE: CPT | Performed by: NURSE PRACTITIONER

## 2023-05-20 PROCEDURE — S0179 MEGESTROL 20 MG: HCPCS | Performed by: NURSE PRACTITIONER

## 2023-05-20 PROCEDURE — 25000003 PHARM REV CODE 250: Performed by: INTERNAL MEDICINE

## 2023-05-20 RX ORDER — METOPROLOL SUCCINATE 25 MG/1
12.5 TABLET, EXTENDED RELEASE ORAL DAILY
Qty: 30 TABLET | Refills: 11
Start: 2023-05-20

## 2023-05-20 RX ADMIN — CALCIUM CARBONATE (ANTACID) CHEW TAB 500 MG 500 MG: 500 CHEW TAB at 08:05

## 2023-05-20 RX ADMIN — POLYETHYLENE GLYCOL 3350 17 G: 17 POWDER, FOR SOLUTION ORAL at 08:05

## 2023-05-20 RX ADMIN — OXYCODONE HYDROCHLORIDE 5 MG: 5 TABLET ORAL at 09:05

## 2023-05-20 RX ADMIN — MIDODRINE HYDROCHLORIDE 5 MG: 2.5 TABLET ORAL at 10:05

## 2023-05-20 RX ADMIN — MEGESTROL ACETATE 400 MG: 400 SUSPENSION ORAL at 08:05

## 2023-05-20 RX ADMIN — CETIRIZINE HYDROCHLORIDE 5 MG: 5 TABLET ORAL at 08:05

## 2023-05-20 RX ADMIN — MIDODRINE HYDROCHLORIDE 5 MG: 2.5 TABLET ORAL at 05:05

## 2023-05-20 RX ADMIN — MIDODRINE HYDROCHLORIDE 5 MG: 2.5 TABLET ORAL at 04:05

## 2023-05-20 RX ADMIN — OXYCODONE HYDROCHLORIDE AND ACETAMINOPHEN 500 MG: 500 TABLET ORAL at 08:05

## 2023-05-20 RX ADMIN — APIXABAN 2.5 MG: 2.5 TABLET, FILM COATED ORAL at 08:05

## 2023-05-20 RX ADMIN — SENNOSIDES AND DOCUSATE SODIUM 1 TABLET: 50; 8.6 TABLET ORAL at 08:05

## 2023-05-20 NOTE — PLAN OF CARE
Contact [facility] [phone] regarding ETA for transportation  per dispatch due shift change. Transportation is about 2hr for transport.

## 2023-05-20 NOTE — PROGRESS NOTES
INPATIENT NEPHROLOGY Progress Note  [facility]    Patient Name: Isabella Boone  Date: 2023    Reason for consultation: ESRD    Chief Complaint:   Chief Complaint   Patient presents with    Hypotension     Sent from [place] for low blood pressure. Pt received dialysis yesterday. Pts complains of pain to tailbone due to wound       History of Present Illness:  Patient is a 79 yo female with h/o HTN, Afib on eliquis, CHF, ESRD HD M,W,F, DM2. Patient is from [facility] and brought to ED by Ems for intermittent mid sternal non radiating chest pain and stage 2 decubitus. She was evaluated in the ED with elevated initial troponin 457.1, EKG showed flip T wave, Dr Cortez contacted and recommended IV heparin gtt ,trend troponin, and cards will see patient in am. She is followed outpatient by cardiologist Dr Talbot. She is a poor historian but able to answer some questions appropriate , location and describe chest pain. She denies radiating chest pain, sob, lightheaded or dizziness, N/V. She was recently dx with Afib RVR during and started on Eliquis bid on 2023. Metoprolol 50 mg recommended daily. She denies smoking, alcohol, or drugs. We are consulted for dialysis.     Interval History:  - VSS, on RA, cp free currently  - got off 3L yest    Plan of Care:    Assessment:  Chest pain- r/o ACS  ESRD on HD MWF  Chronic hypotension- hx of D CHF- Pulm HTN- Edematous  SHPT  Anemia of CKD    Plan:    - reviewed cards eval  - continue HD MWF  - continue midodrine- ordered 2-3L UF- continue to push with each treatment and challenge dry weight  - continue tums with meals  - continue SHAR with HD    Thank you for allowing us to participate in this patient's care. We will continue to follow.    Vital Signs:  Temp Readings from Last 3 Encounters:   23 98.6 °F (37 °C) (Oral)   23 97.7 °F (36.5 °C) (Oral)   17 97 °F (36.1 °C)       Pulse Readings from Last 3 Encounters:    05/20/23 92   04/06/23 60   03/06/23 83       BP Readings from Last 3 Encounters:   05/20/23 (!) 118/53   04/06/23 130/76   03/06/23 (!) 149/65       Weight:  Wt Readings from Last 3 Encounters:   05/20/23 126.1 kg (278 lb)   05/19/23 123.8 kg (273 lb)   04/06/23 118.8 kg (262 lb)     Medications:  Scheduled Meds:   apixaban  2.5 mg Oral BID    ascorbic acid (vitamin C)  500 mg Oral Daily    calcium carbonate  1 tablet Oral Daily    cetirizine  5 mg Oral Daily    epoetin cheli-ebpx (RETACRIT) injection  50 Units/kg Intravenous Every Mon, Wed, Fri    gabapentin  100 mg Oral QHS    megestroL  400 mg Oral BID    midodrine  5 mg Oral TID AC    polyethylene glycol  17 g Oral Daily    senna-docusate 8.6-50 mg  1 tablet Oral BID     Continuous Infusions:  PRN Meds:.acetaminophen, acetaminophen, albuterol sulfate, dextrose 50%, dextrose 50%, glucagon (human recombinant), glucose, glucose, insulin aspart U-100, ipratropium, melatonin, nitroGLYCERIN, ondansetron, oxyCODONE, prochlorperazine, sodium chloride 0.9%  No current facility-administered medications on file prior to encounter.     Current Outpatient Medications on File Prior to Encounter   Medication Sig Dispense Refill    apixaban (ELIQUIS) 2.5 mg Tab Take 1 tablet (2.5 mg total) by mouth 2 (two) times daily. 60 tablet 0    ascorbic acid, vitamin C, (VITAMIN C) 500 MG tablet Take 500 mg by mouth once daily. Give 2 tablets daily      calcium carbonate (TUMS) 200 mg calcium (500 mg) chewable tablet Take 1 tablet by mouth once daily. Take 2 tablets with meals and 3 tablets at night      cetirizine (ZYRTEC) 10 MG tablet Take 5 mg by mouth.      folic acid/vit B complex and C (ROSARIO-INGRID ORAL) Take 1 tablet by mouth once daily.      gabapentin (NEURONTIN) 100 MG capsule Take 100 mg by mouth every evening.      megestroL (MEGACE) 400 mg/10 mL (40 mg/mL) Susp Take 400 mg by mouth 2 (two) times daily.      metoprolol tartrate (LOPRESSOR) 50 MG tablet Take 50 mg by mouth  Daily.      midodrine (PROAMATINE) 5 MG Tab Take 10 mg by mouth 3 (three) times daily with meals.      oxyCODONE (OXY-IR) 5 mg Cap Take 5 mg by mouth every 4 (four) hours as needed for Pain. Give 0.5 tablet by mouth every 4hrs as needed for pain      polyethylene glycol (GLYCOLAX) 17 gram/dose powder Take 17 g by mouth Daily.      hydrocodone-acetaminophen 5-325mg (NORCO) 5-325 mg per tablet Take 1 tablet by mouth every 6 (six) hours as needed. 15 tablet 0    ipratropium (ATROVENT HFA) 17 mcg/actuation inhaler Inhale 1 puff into the lungs every 6 (six) hours as needed for Wheezing. Rescue      ipratropium-albuteroL (COMBIVENT)  mcg/actuation inhaler Inhale 1 puff into the lungs every 6 (six) hours as needed for Wheezing. Rescue      metoprolol succinate (TOPROL-XL) 50 MG 24 hr tablet Take 1 tablet (50 mg total) by mouth once daily. 30 tablet 11    senna-docusate 8.6-50 mg (PERICOLACE) 8.6-50 mg per tablet Take 1 tablet by mouth 2 (two) times daily.         Review of Systems:  Neg    Physical Exam:  General Appearance:    NAD, AAO x 3, cooperative, appears stated age   Head:    Normocephalic, atraumatic   Eyes:    PER, EOMI, and conjunctiva/sclera clear bilaterally       Mouth:   Moist mucus membranes, no thrush or oral lesions,       normal dentition   Back:     No CVA tenderness   Lungs:     Clear to auscultation bilaterally, no wheezes, crackles,           rales or rhonchi, symmetric air movement, respirations unlabored   Chest wall:    No tenderness or deformity   Heart:    Regular rate and rhythm, S1 and S2 normal, no murmur, rub   or gallop   Abdomen:     Soft, non-tender, non-distended, bowel sounds active all four   quadrants, no RT or guarding, no masses, no organomegaly   Extremities:   Warm and well perfused, distal pulses are intact, no             cyanosis, edematous   MSK:   No joint or muscle swelling, tenderness or deformity   Skin:   Skin color, texture, turgor normal, no rashes or lesions    Neurologic/Psychiatric:   CNII-XII intact, normal strength and sensation       throughout, no asterixis; normal affect, memory, judgement     and insight      Results:  Lab Results   Component Value Date     05/20/2023    K 4.3 05/20/2023     05/20/2023    CO2 26 05/20/2023    BUN 16 05/20/2023    CREATININE 4.0 (H) 05/20/2023    CALCIUM 8.7 05/20/2023    ANIONGAP 11 05/20/2023    ESTGFRAFRICA 5 (A) 09/27/2017    EGFRNONAA 4 (A) 09/27/2017       Lab Results   Component Value Date    CALCIUM 8.7 05/20/2023    PHOS 5.0 (H) 03/04/2023       Recent Labs   Lab 05/20/23  0537   WBC 8.77   RBC 3.35*   HGB 10.5*   HCT 35.7*      *   MCH 31.3*   MCHC 29.4*         I have personally reviewed pertinent radiological imaging and reports.    I have spent > 35 minutes providing care for this patient for the above diagnoses. These services have included chart/data/imaging review, evaluation, exam, formulation of plan, , note preparation, and discussions with staff involved in this patient's care.    Wyane Sparks MD MPH  [facility]  [address] [place] [id number]  [phone] (p)  [phone] (f)

## 2023-05-20 NOTE — NURSING
Report given to Mariela, receiving nurse at [facility]. EMS at [facility] pt transported via stretcher

## 2023-05-20 NOTE — PLAN OF CARE
Problem: Skin Injury Risk Increased  Goal: Skin Health and Integrity  Outcome: Ongoing, Progressing     Problem: Adult Inpatient Plan of Care  Goal: Plan of Care Review  Outcome: Ongoing, Progressing  Goal: Patient-Specific Goal (Individualized)  Outcome: Ongoing, Progressing     Problem: Bariatric Environmental Safety  Goal: Safety Maintained with Care  Outcome: Ongoing, Progressing     Problem: Impaired Wound Healing  Goal: Optimal Wound Healing  Outcome: Ongoing, Progressing     Problem: Fall Injury Risk  Goal: Absence of Fall and Fall-Related Injury  Outcome: Ongoing, Progressing     Problem: Device-Related Complication Risk (Hemodialysis)  Goal: Safe, Effective Therapy Delivery  Outcome: Ongoing, Progressing

## 2023-05-20 NOTE — PLAN OF CARE
Problem: Skin Injury Risk Increased  Goal: Skin Health and Integrity  Outcome: Ongoing, Progressing     Problem: Adult Inpatient Plan of Care  Goal: Plan of Care Review  Outcome: Ongoing, Progressing  Goal: Patient-Specific Goal (Individualized)  Outcome: Ongoing, Progressing  Goal: Absence of Hospital-Acquired Illness or Injury  Outcome: Ongoing, Progressing  Goal: Optimal Comfort and Wellbeing  Outcome: Ongoing, Progressing  Goal: Readiness for Transition of Care  Outcome: Ongoing, Progressing     Problem: Bariatric Environmental Safety  Goal: Safety Maintained with Care  Outcome: Ongoing, Progressing     Problem: Impaired Wound Healing  Goal: Optimal Wound Healing  Outcome: Ongoing, Progressing     Problem: Fall Injury Risk  Goal: Absence of Fall and Fall-Related Injury  Outcome: Ongoing, Progressing     Problem: Device-Related Complication Risk (Hemodialysis)  Goal: Safe, Effective Therapy Delivery  Outcome: Ongoing, Progressing     Problem: Hemodynamic Instability (Hemodialysis)  Goal: Effective Tissue Perfusion  Outcome: Ongoing, Progressing     Problem: Infection (Hemodialysis)  Goal: Absence of Infection Signs and Symptoms  Outcome: Ongoing, Progressing

## 2023-05-20 NOTE — PLAN OF CARE
05/20/23 1405   Final Note   Assessment Type Final Discharge Note   [facility]   What phone number can be called within the next 1-3 days to see how you are doing after discharge? [phone]   Post-Acute Status   Coverage Medicare   Discharge Delays (!) Ambulance Transport/Facility Transport     Spoke with nurse at [place] informed that pt can return today; however transportation will have to be arranged by hospital. Fax order to [place] at [phone] fax. Number for report is [phone] nurse is Mariela. Informed staff at [facility] of information. Transportation ordered    Patient cleared for discharge from case management standpoint.    Chart and discharge orders reviewed.  Patient discharged to nursing home with no further case management needs.

## 2023-05-20 NOTE — HOSPITAL COURSE
78-year-old  female who is a nursing home resident with complicated medical history including but not limited to paroxysmal atrial fibrillation, chronic diastolic congestive heart failure, chronic hypotension on midodrine, ESRD on hemodialysis and anemia of renal disease transferred here for hypotension and questionable atypical chest pain which she has later denied.     She was found to have elevated troponin.  On admission she was initiated on heparin drip due to concerns for NSTEMI which has since been discontinued.  Hypotension appears to be chronic.  Home midodrine has been restarted with improvement in blood pressure.  Metoprolol succinate dose has been changed as outlined below.  Seen by Nephrology and Cardiology.  No acute interventions as per Cardiology.  Received dialysis at the direction of Nephrology.  Blood pressure has improved.  Patient seen and examined on the day of discharge and deemed appropriate for the same.

## 2023-05-20 NOTE — PLAN OF CARE
Problem: Skin Injury Risk Increased  Goal: Skin Health and Integrity  5/20/2023 1443 by Luzma Zeng RN  Outcome: Met  5/20/2023 1011 by Luzma Zeng RN  Outcome: Ongoing, Progressing     Problem: Adult Inpatient Plan of Care  Goal: Plan of Care Review  5/20/2023 1443 by Luzma Zeng RN  Outcome: Met  5/20/2023 1011 by Luzma Zeng RN  Outcome: Ongoing, Progressing  Goal: Patient-Specific Goal (Individualized)  5/20/2023 1443 by Luzma Zeng RN  Outcome: Met  5/20/2023 1011 by Luzma Zeng RN  Outcome: Ongoing, Progressing  Goal: Absence of Hospital-Acquired Illness or Injury  5/20/2023 1443 by Luzma Zeng RN  Outcome: Met  5/20/2023 1011 by Luzma Zeng RN  Outcome: Ongoing, Progressing  Goal: Optimal Comfort and Wellbeing  5/20/2023 1443 by Luzma Zeng RN  Outcome: Met  5/20/2023 1011 by Luzma Zeng RN  Outcome: Ongoing, Progressing  Goal: Readiness for Transition of Care  5/20/2023 1443 by Luzma Zeng RN  Outcome: Met  5/20/2023 1011 by Luzma Zeng RN  Outcome: Ongoing, Progressing     Problem: Bariatric Environmental Safety  Goal: Safety Maintained with Care  5/20/2023 1443 by Luzma Zeng RN  Outcome: Met  5/20/2023 1011 by Luzma Zeng RN  Outcome: Ongoing, Progressing     Problem: Impaired Wound Healing  Goal: Optimal Wound Healing  5/20/2023 1443 by Luzma Zeng RN  Outcome: Met  5/20/2023 1011 by Luzma Zeng RN  Outcome: Ongoing, Progressing     Problem: Fall Injury Risk  Goal: Absence of Fall and Fall-Related Injury  5/20/2023 1443 by Luzma Zeng RN  Outcome: Met  5/20/2023 1011 by Luzma Zeng RN  Outcome: Ongoing, Progressing     Problem: Device-Related Complication Risk (Hemodialysis)  Goal: Safe, Effective Therapy Delivery  5/20/2023 1443 by Luzma Zeng RN  Outcome: Met  5/20/2023 1011 by Luzma Haslauer, RN  Outcome: Ongoing, Progressing     Problem: Hemodynamic Instability (Hemodialysis)  Goal: Effective Tissue Perfusion  5/20/2023  1443 by Luzma Zeng RN  Outcome: Met  5/20/2023 1011 by Luzma Zeng RN  Outcome: Ongoing, Progressing     Problem: Infection (Hemodialysis)  Goal: Absence of Infection Signs and Symptoms  5/20/2023 1443 by Luzma Zeng RN  Outcome: Met  5/20/2023 1011 by Luzma Zeng RN  Outcome: Ongoing, Progressing

## (undated) DEVICE — SPONGE DERMACEA GAUZE 4X4

## (undated) DEVICE — DRAPE MINOR PROCEDURE

## (undated) DEVICE — MAT QUICK 40X30 FLOOR FLUID LF

## (undated) DEVICE — GLOVE BIOGEL 7.0

## (undated) DEVICE — GLOVE BIOGEL 7.5

## (undated) DEVICE — ELECTRODE REM PLYHSV RETURN 9

## (undated) DEVICE — TOWELS STERILE 18 X 25.5

## (undated) DEVICE — PAD ABDOMINAL 5X9 STERILE

## (undated) DEVICE — LEGGINGS 48X31 INCH

## (undated) DEVICE — GAUZE SPONGE BULKEE 6X6.75IN

## (undated) DEVICE — PACK CUSTOM UNIV BASIN SLI

## (undated) DEVICE — UNDERGLOVES BIOGEL PI SIZE 7.5

## (undated) DEVICE — SUT 2-0 VICRYL / SH (J417)

## (undated) DEVICE — GOWN X-LARGE

## (undated) DEVICE — UNDERGLOVES BIOGEL PI SZ 7 LF